# Patient Record
Sex: FEMALE | Race: WHITE | NOT HISPANIC OR LATINO | Employment: FULL TIME | URBAN - METROPOLITAN AREA
[De-identification: names, ages, dates, MRNs, and addresses within clinical notes are randomized per-mention and may not be internally consistent; named-entity substitution may affect disease eponyms.]

---

## 2017-08-15 ENCOUNTER — TRANSCRIBE ORDERS (OUTPATIENT)
Dept: ADMINISTRATIVE | Facility: HOSPITAL | Age: 57
End: 2017-08-15

## 2017-08-15 DIAGNOSIS — Z12.31 VISIT FOR SCREENING MAMMOGRAM: Primary | ICD-10-CM

## 2017-09-14 ENCOUNTER — HOSPITAL ENCOUNTER (OUTPATIENT)
Dept: MAMMOGRAPHY | Facility: MEDICAL CENTER | Age: 57
Discharge: HOME | End: 2017-09-14
Payer: COMMERCIAL

## 2017-09-14 DIAGNOSIS — Z12.31 VISIT FOR SCREENING MAMMOGRAM: ICD-10-CM

## 2017-09-14 PROCEDURE — G0202 SCR MAMMO BI INCL CAD: HCPCS

## 2018-02-15 ENCOUNTER — OFFICE VISIT (OUTPATIENT)
Dept: GYNECOLOGIC ONCOLOGY | Facility: CLINIC | Age: 58
End: 2018-02-15
Payer: COMMERCIAL

## 2018-02-15 VITALS
BODY MASS INDEX: 45.99 KG/M2 | HEART RATE: 104 BPM | TEMPERATURE: 99.2 F | WEIGHT: 293 LBS | HEIGHT: 67 IN | RESPIRATION RATE: 16 BRPM

## 2018-02-15 DIAGNOSIS — R93.89 THICKENED ENDOMETRIUM: Primary | ICD-10-CM

## 2018-02-15 DIAGNOSIS — N95.0 PMB (POSTMENOPAUSAL BLEEDING): ICD-10-CM

## 2018-02-15 PROCEDURE — 99242 OFF/OP CONSLTJ NEW/EST SF 20: CPT | Performed by: OBSTETRICS & GYNECOLOGY

## 2018-02-15 RX ORDER — HYDROCHLOROTHIAZIDE 50 MG/1
50 TABLET ORAL DAILY
COMMUNITY
Start: 2018-01-02 | End: 2020-05-19

## 2018-02-15 RX ORDER — BUPROPION HYDROCHLORIDE 150 MG/1
150 TABLET ORAL EVERY MORNING
COMMUNITY
Start: 2018-01-02 | End: 2019-12-08

## 2018-02-15 RX ORDER — SODIUM CHLORIDE, SODIUM LACTATE, POTASSIUM CHLORIDE, CALCIUM CHLORIDE 600; 310; 30; 20 MG/100ML; MG/100ML; MG/100ML; MG/100ML
125 INJECTION, SOLUTION INTRAVENOUS CONTINUOUS
Status: CANCELLED | OUTPATIENT
Start: 2018-02-15

## 2018-02-15 RX ORDER — ZINC GLUCONATE 50 MG
50 TABLET ORAL DAILY
COMMUNITY
End: 2020-02-12 | Stop reason: HOSPADM

## 2018-02-15 RX ORDER — ASCORBIC ACID 500 MG
500 TABLET ORAL DAILY
COMMUNITY
End: 2020-02-12 | Stop reason: HOSPADM

## 2018-02-15 NOTE — H&P
Assessment/Plan:    Problem List Items Addressed This Visit        Other    Thickened endometrium - Primary      Morbidly obese, BMI 52 kilograms/meter squared with 2 3 cm endometrium  Initial dilation curettage unsuccessful  Her performance status is 0   1  I discussed the risks and benefits of robotic assisted total laparoscopic hysterectomy and bilateral salpingo-oophorectomy  2   I discussed the risks and benefits of repeat dilation and curettage under ultrasound guidance  3  I discussed possible uterine pathology causing endometrial thickening  4   Based upon the risks and benefits of the procedures, she would prefer to have the most minimally invasive procedure performed and would prefer to proceed with the dilation and curettage under ultrasound guidance with possible hysteroscopy  She understands the risks and benefits of the surgery  Consent was obtained by me  She agrees to proceed as outlined  I spent 30 minutes with the patient  More than half the time was spent in counseling and coordination of care regarding treatment of her thickened endometrium  Thank you for the courtesy of this consultation  All questions were answered by the end of the visit  Relevant Orders    Case request operating room: DILATATION AND CURETTAGE (D&C), possible hysteroscopy (Completed)    Type and screen    Comprehensive metabolic panel    CBC and Platelet    APTT    Protime-INR    XR chest pa & lateral    PMB (postmenopausal bleeding)              CHIEF COMPLAINT:   Thickened endometrium, postmenopausal bleeding      Subjective:  See HPI    Problem:   thickened endometrium, postmenopausal bleeding    Previous therapy:   No history exists  Patient ID: Mare Avery is a 59 y o  female   52-year-old with morbid obesity, BMI 52 kilograms/meter squared who had postmenopausal bleeding for 2 episodes    She was evaluated with an ultrasound in August of 2017 that revealed the uterus to measure 9 5 x 5 9 cm with endometrial thickness of 2 3 cm  The ovaries were not visualized  An attempt was made to biopsy the endometrium in the office, but she could not tolerate the biopsy in the office  Therefore, she was taken for [facility]  which was technically difficult secondary to her morbid obesity and anatomy  She was therefore referred as a consultation by Dr Rowena Laurent to discuss treatment options for her thickened endometrium and postmenopausal bleeding  She currently does not have any vaginal bleeding or pelvic pain  Review of Systems   All other systems reviewed and are negative  Current Outpatient Prescriptions   Medication Sig Dispense Refill    ascorbic acid (VITAMIN C) 500 mg tablet Take 500 mg by mouth daily      buPROPion (WELLBUTRIN XL) 150 mg 24 hr tablet Take 150 mg by mouth      Calcium-Vitamin D-Vitamin K 500-100-40 MG-UNT-MCG CHEW Chew 600 mg      hydrochlorothiazide (HYDRODIURIL) 50 mg tablet Take 50 mg by mouth      metFORMIN (GLUCOPHAGE) 500 mg tablet Take 500 mg by mouth      Multiple Vitamins-Minerals (MULTIVITAMIN ADULT PO) Take 1 tablet by mouth      zinc gluconate 50 mg tablet Take 50 mg by mouth daily       No current facility-administered medications for this visit  Allergies   Allergen Reactions    Erythromycin Base GI Intolerance    Lisinopril Cough       History reviewed  No pertinent past medical history      Past Surgical History:   Procedure Laterality Date    APPENDECTOMY      AUGMENTATION BREAST      CERVICAL BIOPSY  W/ LOOP ELECTRODE EXCISION      TUBAL LIGATION      TUBAL REANASTOMOSIS         OB History      Para Term  AB Living    3 3            SAB TAB Ectopic Multiple Live Births                       Family History   Problem Relation Age of Onset    Cancer Maternal Aunt      BLADDER       The following portions of the patient's history were reviewed and updated as appropriate: allergies, current medications, past family history, past medical history, past social history, past surgical history and problem list       Objective:    Pulse 104, temperature 99 2 °F (37 3 °C), resp  rate 16, height 5' 7" (1 702 m), weight (!) 152 kg (334 lb)  Physical Exam   Constitutional: She is oriented to person, place, and time  She appears well-developed and well-nourished  No distress  Cardiovascular: Normal rate, regular rhythm and normal heart sounds  Pulmonary/Chest: Effort normal and breath sounds normal  No respiratory distress  She has no wheezes  She has no rales  Genitourinary:   Genitourinary Comments: Deferred   Neurological: She is alert and oriented to person, place, and time  No cranial nerve deficit  Skin: Skin is warm and dry  She is not diaphoretic  Psychiatric: She has a normal mood and affect   Her behavior is normal  Judgment and thought content normal

## 2018-02-15 NOTE — PATIENT INSTRUCTIONS
1  Nothing to eat or drink after midnight prior to the surgery  2   Taking medications as scheduled the morning of surgery with a sip of water with the exception of metformin

## 2018-02-15 NOTE — ASSESSMENT & PLAN NOTE
Morbidly obese, BMI 52 kilograms/meter squared with 2 3 cm endometrium  Initial dilation curettage unsuccessful  Her performance status is 0   1  I discussed the risks and benefits of robotic assisted total laparoscopic hysterectomy and bilateral salpingo-oophorectomy  2   I discussed the risks and benefits of repeat dilation and curettage under ultrasound guidance  3  I discussed possible uterine pathology causing endometrial thickening  4   Based upon the risks and benefits of the procedures, she would prefer to have the most minimally invasive procedure performed and would prefer to proceed with the dilation and curettage under ultrasound guidance with possible hysteroscopy  She understands the risks and benefits of the surgery  Consent was obtained by me  She agrees to proceed as outlined  I spent 30 minutes with the patient  More than half the time was spent in counseling and coordination of care regarding treatment of her thickened endometrium  Thank you for the courtesy of this consultation  All questions were answered by the end of the visit

## 2018-03-15 RX ORDER — KRILL/OM-3/DHA/EPA/PHOSPHO/AST 500MG-86MG
CAPSULE ORAL DAILY
COMMUNITY
End: 2020-02-06 | Stop reason: HOSPADM

## 2018-03-15 RX ORDER — LYSINE HCL 500 MG
TABLET ORAL
COMMUNITY

## 2018-03-19 ENCOUNTER — ANESTHESIA EVENT (OUTPATIENT)
Dept: PERIOP | Facility: HOSPITAL | Age: 58
End: 2018-03-19
Payer: COMMERCIAL

## 2018-03-20 ENCOUNTER — HOSPITAL ENCOUNTER (OUTPATIENT)
Facility: HOSPITAL | Age: 58
Setting detail: OUTPATIENT SURGERY
Discharge: HOME | End: 2018-03-20
Attending: OBSTETRICS & GYNECOLOGY | Admitting: OBSTETRICS & GYNECOLOGY
Payer: COMMERCIAL

## 2018-03-20 ENCOUNTER — ANESTHESIA (OUTPATIENT)
Dept: PERIOP | Facility: HOSPITAL | Age: 58
End: 2018-03-20
Payer: COMMERCIAL

## 2018-03-20 ENCOUNTER — HOSPITAL ENCOUNTER (OUTPATIENT)
Dept: RADIOLOGY | Facility: HOSPITAL | Age: 58
Discharge: HOME | End: 2018-03-20
Attending: OBSTETRICS & GYNECOLOGY
Payer: COMMERCIAL

## 2018-03-20 VITALS
TEMPERATURE: 100.3 F | RESPIRATION RATE: 16 BRPM | DIASTOLIC BLOOD PRESSURE: 76 MMHG | OXYGEN SATURATION: 95 % | SYSTOLIC BLOOD PRESSURE: 145 MMHG | HEART RATE: 80 BPM

## 2018-03-20 DIAGNOSIS — R93.89 THICKENED ENDOMETRIUM: ICD-10-CM

## 2018-03-20 DIAGNOSIS — R93.89 ABNORMAL RADIOLOGICAL FINDINGS IN SKIN AND SUBCUTANEOUS TISSUE: ICD-10-CM

## 2018-03-20 PROBLEM — N88.2 CERVICAL STENOSIS (UTERINE CERVIX): Status: ACTIVE | Noted: 2018-03-20

## 2018-03-20 PROBLEM — E66.01 MORBID OBESITY WITH BMI OF 50.0-59.9, ADULT (HCC): Status: ACTIVE | Noted: 2018-03-20

## 2018-03-20 LAB
ABO GROUP BLD: NORMAL
BLD GP AB SCN SERPL QL: NEGATIVE
GLUCOSE SERPL-MCNC: 116 MG/DL (ref 65–140)
GLUCOSE SERPL-MCNC: 120 MG/DL (ref 65–140)
RH BLD: POSITIVE
SPECIMEN EXPIRATION DATE: NORMAL

## 2018-03-20 PROCEDURE — 58558 HYSTEROSCOPY BIOPSY: CPT | Performed by: OBSTETRICS & GYNECOLOGY

## 2018-03-20 PROCEDURE — 86900 BLOOD TYPING SEROLOGIC ABO: CPT | Performed by: OBSTETRICS & GYNECOLOGY

## 2018-03-20 PROCEDURE — 86850 RBC ANTIBODY SCREEN: CPT | Performed by: OBSTETRICS & GYNECOLOGY

## 2018-03-20 PROCEDURE — 86901 BLOOD TYPING SEROLOGIC RH(D): CPT | Performed by: OBSTETRICS & GYNECOLOGY

## 2018-03-20 PROCEDURE — 82948 REAGENT STRIP/BLOOD GLUCOSE: CPT

## 2018-03-20 PROCEDURE — 88305 TISSUE EXAM BY PATHOLOGIST: CPT | Performed by: PATHOLOGY

## 2018-03-20 RX ORDER — SODIUM CHLORIDE, SODIUM LACTATE, POTASSIUM CHLORIDE, CALCIUM CHLORIDE 600; 310; 30; 20 MG/100ML; MG/100ML; MG/100ML; MG/100ML
125 INJECTION, SOLUTION INTRAVENOUS CONTINUOUS
Status: DISCONTINUED | OUTPATIENT
Start: 2018-03-20 | End: 2018-03-20

## 2018-03-20 RX ORDER — PROPOFOL 10 MG/ML
INJECTION, EMULSION INTRAVENOUS AS NEEDED
Status: DISCONTINUED | OUTPATIENT
Start: 2018-03-20 | End: 2018-03-20 | Stop reason: SURG

## 2018-03-20 RX ORDER — METOCLOPRAMIDE HYDROCHLORIDE 5 MG/ML
INJECTION INTRAMUSCULAR; INTRAVENOUS AS NEEDED
Status: DISCONTINUED | OUTPATIENT
Start: 2018-03-20 | End: 2018-03-20 | Stop reason: SURG

## 2018-03-20 RX ORDER — ONDANSETRON 2 MG/ML
4 INJECTION INTRAMUSCULAR; INTRAVENOUS EVERY 6 HOURS PRN
Status: DISCONTINUED | OUTPATIENT
Start: 2018-03-20 | End: 2018-03-20 | Stop reason: HOSPADM

## 2018-03-20 RX ORDER — KETOROLAC TROMETHAMINE 30 MG/ML
INJECTION, SOLUTION INTRAMUSCULAR; INTRAVENOUS AS NEEDED
Status: DISCONTINUED | OUTPATIENT
Start: 2018-03-20 | End: 2018-03-20 | Stop reason: SURG

## 2018-03-20 RX ORDER — LIDOCAINE HYDROCHLORIDE 10 MG/ML
INJECTION, SOLUTION INFILTRATION; PERINEURAL AS NEEDED
Status: DISCONTINUED | OUTPATIENT
Start: 2018-03-20 | End: 2018-03-20 | Stop reason: SURG

## 2018-03-20 RX ORDER — MIDAZOLAM HYDROCHLORIDE 1 MG/ML
INJECTION INTRAMUSCULAR; INTRAVENOUS AS NEEDED
Status: DISCONTINUED | OUTPATIENT
Start: 2018-03-20 | End: 2018-03-20 | Stop reason: SURG

## 2018-03-20 RX ORDER — ONDANSETRON 2 MG/ML
INJECTION INTRAMUSCULAR; INTRAVENOUS AS NEEDED
Status: DISCONTINUED | OUTPATIENT
Start: 2018-03-20 | End: 2018-03-20 | Stop reason: SURG

## 2018-03-20 RX ORDER — MAGNESIUM HYDROXIDE 1200 MG/15ML
LIQUID ORAL AS NEEDED
Status: DISCONTINUED | OUTPATIENT
Start: 2018-03-20 | End: 2018-03-20 | Stop reason: HOSPADM

## 2018-03-20 RX ORDER — OXYCODONE HYDROCHLORIDE 10 MG/1
10 TABLET ORAL EVERY 4 HOURS PRN
Status: DISCONTINUED | OUTPATIENT
Start: 2018-03-20 | End: 2018-03-20 | Stop reason: HOSPADM

## 2018-03-20 RX ORDER — ACETAMINOPHEN 325 MG/1
650 TABLET ORAL EVERY 4 HOURS PRN
Status: DISCONTINUED | OUTPATIENT
Start: 2018-03-20 | End: 2018-03-20 | Stop reason: HOSPADM

## 2018-03-20 RX ORDER — FENTANYL CITRATE/PF 50 MCG/ML
25 SYRINGE (ML) INJECTION
Status: DISCONTINUED | OUTPATIENT
Start: 2018-03-20 | End: 2018-03-20 | Stop reason: HOSPADM

## 2018-03-20 RX ORDER — OXYCODONE HYDROCHLORIDE AND ACETAMINOPHEN 5; 325 MG/1; MG/1
1 TABLET ORAL EVERY 4 HOURS PRN
Status: DISCONTINUED | OUTPATIENT
Start: 2018-03-20 | End: 2018-03-20 | Stop reason: HOSPADM

## 2018-03-20 RX ORDER — FENTANYL CITRATE 50 UG/ML
INJECTION, SOLUTION INTRAMUSCULAR; INTRAVENOUS AS NEEDED
Status: DISCONTINUED | OUTPATIENT
Start: 2018-03-20 | End: 2018-03-20 | Stop reason: SURG

## 2018-03-20 RX ORDER — ONDANSETRON 2 MG/ML
4 INJECTION INTRAMUSCULAR; INTRAVENOUS ONCE AS NEEDED
Status: DISCONTINUED | OUTPATIENT
Start: 2018-03-20 | End: 2018-03-20 | Stop reason: HOSPADM

## 2018-03-20 RX ADMIN — KETOROLAC TROMETHAMINE 30 MG: 30 INJECTION, SOLUTION INTRAMUSCULAR at 08:35

## 2018-03-20 RX ADMIN — FENTANYL CITRATE 50 MCG: 50 INJECTION, SOLUTION INTRAMUSCULAR; INTRAVENOUS at 08:07

## 2018-03-20 RX ADMIN — ONDANSETRON 4 MG: 2 INJECTION INTRAMUSCULAR; INTRAVENOUS at 08:08

## 2018-03-20 RX ADMIN — PROPOFOL 200 MG: 10 INJECTION, EMULSION INTRAVENOUS at 07:58

## 2018-03-20 RX ADMIN — METOCLOPRAMIDE 10 MG: 5 INJECTION, SOLUTION INTRAMUSCULAR; INTRAVENOUS at 08:00

## 2018-03-20 RX ADMIN — FENTANYL CITRATE 25 MCG: 50 INJECTION, SOLUTION INTRAMUSCULAR; INTRAVENOUS at 08:20

## 2018-03-20 RX ADMIN — DEXAMETHASONE SODIUM PHOSPHATE 10 MG: 10 INJECTION INTRAMUSCULAR; INTRAVENOUS at 08:05

## 2018-03-20 RX ADMIN — MIDAZOLAM HYDROCHLORIDE 2 MG: 1 INJECTION, SOLUTION INTRAMUSCULAR; INTRAVENOUS at 07:50

## 2018-03-20 RX ADMIN — LIDOCAINE HYDROCHLORIDE 50 MG: 10 INJECTION, SOLUTION INFILTRATION; PERINEURAL at 07:58

## 2018-03-20 RX ADMIN — FENTANYL CITRATE 25 MCG: 50 INJECTION, SOLUTION INTRAMUSCULAR; INTRAVENOUS at 08:15

## 2018-03-20 RX ADMIN — SODIUM CHLORIDE, SODIUM LACTATE, POTASSIUM CHLORIDE, AND CALCIUM CHLORIDE: .6; .31; .03; .02 INJECTION, SOLUTION INTRAVENOUS at 07:45

## 2018-03-20 NOTE — H&P
Assessment/Plan:    69-year-old with morbid obesity, BMI 52 kilograms/meter squared with postmenopausal bleeding, endometrial thickness 2 3 cm  Performance status is 0   1   Plan for dilation and curettage under ultrasound guidance  CHIEF COMPLAINT:   Thickened endometrium, postmenopausal bleeding      Subjective:  See HPI    Problem:   thickened endometrium, postmenopausal bleeding    Previous therapy:   No history exists  Patient ID: Gael Jarrell is a 59 y o  female   69-year-old with morbid obesity, BMI 52 kilograms/meter squared who had postmenopausal bleeding for 2 episodes  She was evaluated with an ultrasound in August of 2017 that revealed the uterus to measure 9 5 x 5 9 cm with endometrial thickness of 2 3 cm  The ovaries were not visualized  An attempt was made to biopsy the endometrium in the office, but she could not tolerate the biopsy in the office  Therefore, she was taken for [facility]  which was technically difficult secondary to her morbid obesity and anatomy  She was therefore referred as a consultation by Dr Lizeth Gary to discuss treatment options for her thickened endometrium and postmenopausal bleeding  She currently does not have any vaginal bleeding or pelvic pain  No changes in her medical history or medications since her last visit  Review of Systems   All other systems reviewed and are negative        Current Facility-Administered Medications   Medication Dose Route Frequency Provider Last Rate Last Dose    lactated ringers infusion  125 mL/hr Intravenous Continuous Rachna Peters MD           Allergies   Allergen Reactions    Erythromycin Base GI Intolerance    Lisinopril Cough       Past Medical History:   Diagnosis Date    Diabetes mellitus ([address] )     Hypertension        Past Surgical History:   Procedure Laterality Date    APPENDECTOMY      AUGMENTATION BREAST      CERVICAL BIOPSY  W/ LOOP ELECTRODE EXCISION      TUBAL LIGATION      TUBAL REANASTOMOSIS         OB History      Para Term  AB Living    3 3            SAB TAB Ectopic Multiple Live Births                       Family History   Problem Relation Age of Onset    Cancer Maternal Aunt      BLADDER       The following portions of the patient's history were reviewed and updated as appropriate: allergies, current medications, past family history, past medical history, past social history, past surgical history and problem list       Objective:    Blood pressure (!) 193/89, pulse 96, temperature 99 2 °F (37 3 °C), temperature source Tympanic Core, resp  rate 20, SpO2 92 %  Physical Exam   Constitutional: She is oriented to person, place, and time  She appears well-developed and well-nourished  No distress  Cardiovascular: Normal rate, regular rhythm and normal heart sounds  Pulmonary/Chest: Effort normal and breath sounds normal    Genitourinary:   Genitourinary Comments: Deferred   Neurological: She is alert and oriented to person, place, and time  No cranial nerve deficit  Skin: Skin is warm and dry  She is not diaphoretic  Psychiatric: She has a normal mood and affect   Her behavior is normal  Judgment and thought content normal

## 2018-03-20 NOTE — DISCHARGE INSTRUCTIONS
Post-Gynecologic Surgery Discharge Instructions:  1  No heavy lifting more than one full gallon of milk (about 8 lbs) for 1 week  2  Nothing in the vagina for 4 weeks  3  You may take stairs one at a time, touching each step with both feet for the first few days, then as tolerated  4  Call the office for fever greater than 100  4'F, heavy vaginal bleeding, or increasing pain  5  Activity as tolerated  Post Operative Pain Management:  If you have cramping or mild pain you may take 600 mg Ibuprofen every 6 hours to relieve  If you continue to have residual mild pain not entirely relieved by Ibuprofen then you may take 650 mg of tylenol every 6 hours  If you have any questions regarding your prescriptions please call your doctor  Dilation and Curettage   WHAT YOU NEED TO KNOW:   Dilation and curettage (D&C) is a procedure to remove tissue from the lining of your uterus  DISCHARGE INSTRUCTIONS:   Call 911 for any of the following:   · You have signs of an allergic reaction, such as hives, trouble breathing, or severe swelling  Seek care immediately if:   · You have heavy vaginal bleeding that soaks 1 pad in 1 hour for 2 hours in a row  · You have a fever higher than 100 4°F (38°C)  · You have abdominal cramps for more than 2 days  · Your pain does not get better, even after treatment  Contact your healthcare provider if:   · You have foul-smelling vaginal discharge  · You do not get your monthly period  · You feel depressed or anxious  · You feel very tired and weak  · You have questions or concerns about your condition or care  Medicines: You may need any of the following:  · Prescription pain medicine  may be given  Do not wait until the pain is severe before you take your medicine  Ask your healthcare provider how to take this medicine safely  · Antibiotics  help fight or prevent a bacterial infection  · Take your medicine as directed    Contact your healthcare provider if you think your medicine is not helping or if you have side effects  Tell him or her if you are allergic to any medicine  Keep a list of the medicines, vitamins, and herbs you take  Include the amounts, and when and why you take them  Bring the list or the pill bottles to follow-up visits  Carry your medicine list with you in case of an emergency  Self-care:   · Use sanitary pads if needed  You may have light bleeding for up to 2 weeks  Do not use tampons  Use sanitary pads instead  This will help prevent a vaginal infection  · Rest as needed  Slowly start to do more each day  Return to your daily activities as directed  · Do not have sex for at least 2 weeks after the procedure  This will help prevent an infection  · Use birth control right after your procedure  Your monthly period should start again in 4 to 8 weeks  During this time, you could still ovulate (release an egg)  Use birth control as directed to prevent pregnancy during this time  Follow up with your healthcare provider as directed:  Write down your questions so you remember to ask them during your visits  © 2017 [employer]  Information is for End User's use only and may not be sold, redistributed or otherwise used for commercial purposes  All illustrations and images included in CareNotes® are the copyrighted property of [employer]  or Christopher Arre[employer]  The above information is an  only  It is not intended as medical advice for individual conditions or treatments  Talk to your doctor, nurse or pharmacist before following any medical regimen to see if it is safe and effective for you

## 2018-03-20 NOTE — OP NOTE
OPERATIVE REPORT  PATIENT NAME: Kathy Ballesteros    :  1960  MRN: [id number]  Pt Location: [facility] OR ROOM 14    SURGERY DATE: 3/20/2018    Surgeon(s) and Role:     * Riky Cox MD - Primary     * Adal Grayson - Assisting     * Arcelia Perez MD - Assisting     * Geri Hamilton MD - Observing    Preop Diagnosis: Thickened endometrium [R93 8], morbid obesity BMI 52 kilograms/meter squared, cervical stenosis    Post-Op Diagnosis Codes: * Thickened endometrium [R93 8], morbid obesity BMI 52 kilograms/meter squared, cervical stenosis    Procedure(s) (LRB):  DILATATION AND CURETTAGE (D&C),HYSTEROSCOPY (N/A)  HYSTEROSCOPY (N/A)    Specimen(s):  ID Type Source Tests Collected by Time Destination   1 : Endometrial currettings Tissue Endometrium TISSUE EXAM Riky Cox MD 3/20/2018 0872        Estimated Blood Loss:   Minimal    Drains:       Anesthesia Type:   General/LMA    Operative Indications: Thickened endometrium [R808]  15-year-old with postmenopausal bleeding, thickened endometrium, morbid obesity with BMI 52 kilograms/meter squared with cervical stenosis  She presented for D and C under ultrasound guidance    Operative Findings:  1  Exam under anesthesia revealed a mobile uterus  The exact size and contour could not be determined secondary to morbid obesity  The cervix was noted to be scarred to the vaginal apex  It was stenotic  2   On hysteroscopy, there were 2 small polyps present within the endometrium  After curettage and polypectomy, there were noted to be removed  Complications:   None    Procedure and Technique:  After informed consent was obtained, the patient was taken to the operating room where general endotracheal anesthesia was administered without incident  She was then prepped and draped in normal sterile fashion in the dorsal lithotomy position  Examination under anesthesia revealed the above-mentioned findings  A speculum was placed in the patient's vagina    The anterior vaginal wall above the cervical os was grasped with a single-tooth tenaculum  Cervical os was noted to be stenotic  Therefore, a Jennifer clamp was used to dilate the external cervical os  A uterine sound was then able to be passed into the endometrial cavity that measured approximately 8 cm in depth  The cervix was then dilated with [facility] dilators  A hysteroscopy was performed with findings noted as above  The cervix was then dilated further and polyp forceps were used to remove small polyps  A sharp curettage was then performed under ultrasound guidance  In order to facilitate the ultrasound, 180 cc of saline were instilled into the bladder using a red rubber catheter  Once the curettage was complete, repeat hysteroscopy demonstrated removal of the polyps  All specimens were sent for permanent section  The tenaculum was removed  Hemostasis was spontaneous  The bladder was then drained with the red rubber catheter  She was then awakened and transferred to the recovery room in stable condition  All instrument counts correct x2 for procedure  No complications  Estimated blood loss is less than 50 mL     I was present for the entire procedure    Patient Disposition:  PACU     SIGNATURE: Ana Johnson MD  DATE: March 20, 2018  TIME: 8:47 AM

## 2018-03-20 NOTE — ANESTHESIA POSTPROCEDURE EVALUATION
Post-Op Assessment Note      CV Status:  Stable    Mental Status:  Alert    Hydration Status:  Stable    PONV Controlled:  None    Airway Patency:  Patent    Post Op Vitals Reviewed: Yes          Staff: CRNA           BP  138/84   Temp   98 3   Pulse  84   Resp   16   SpO2   93%

## 2018-03-20 NOTE — ANESTHESIA PREPROCEDURE EVALUATION
Review of Systems/Medical History  Patient summary reviewed  Chart reviewed  No history of anesthetic complications     Cardiovascular  Exercise tolerance: good,  Hypertension controlled,    Pulmonary  Smoker (quit 2010) ex-smoker  , No recent URI ,        GI/Hepatic  Negative GI/hepatic ROS   No GERD ,   Comment: Confirmed NPO appropriate     Negative  ROS        Endo/Other  Diabetes (Glc 120 in preop holding) type 2 Diet controlled,   Obesity  super morbid obesity   GYN      Comment: Post-menopausal spotting     Hematology  Negative hematology ROS      Musculoskeletal  Negative musculoskeletal ROS        Neurology  Negative neurology ROS      Psychology   Negative psychology ROS              Physical Exam    Airway    Mallampati score: III  TM Distance: >3 FB  Neck ROM: full     Dental   No notable dental hx     Cardiovascular  Rhythm: regular, Rate: normal, Cardiovascular exam normal    Pulmonary  Pulmonary exam normal Breath sounds clear to auscultation,     Other Findings        Anesthesia Plan  ASA Score- 3     Anesthesia Type- general with ASA Monitors  Additional Monitors:   Airway Plan:         Plan Factors-    Induction- intravenous  Postoperative Plan- Plan for postoperative opioid use  Informed Consent- Anesthetic plan and risks discussed with patient            No results found for: WBC, HGB, HCT, MCV, PLT  No results found for: GLUCOSE, CALCIUM, NA, K, CO2, CL, BUN, CREATININE  No results found for: HGBA1C

## 2018-03-22 ENCOUNTER — TELEPHONE (OUTPATIENT)
Dept: GYNECOLOGIC ONCOLOGY | Facility: CLINIC | Age: 58
End: 2018-03-22

## 2018-04-04 PROBLEM — Z98.890 POSTOPERATIVE STATE: Status: ACTIVE | Noted: 2018-04-04

## 2018-04-05 ENCOUNTER — OFFICE VISIT (OUTPATIENT)
Dept: GYNECOLOGIC ONCOLOGY | Facility: CLINIC | Age: 58
End: 2018-04-05

## 2018-04-05 VITALS
HEART RATE: 105 BPM | SYSTOLIC BLOOD PRESSURE: 168 MMHG | BODY MASS INDEX: 45.99 KG/M2 | WEIGHT: 293 LBS | HEIGHT: 67 IN | RESPIRATION RATE: 18 BRPM | DIASTOLIC BLOOD PRESSURE: 88 MMHG

## 2018-04-05 DIAGNOSIS — Z98.890 POSTOPERATIVE STATE: Primary | ICD-10-CM

## 2018-04-05 PROCEDURE — 99024 POSTOP FOLLOW-UP VISIT: CPT | Performed by: OBSTETRICS & GYNECOLOGY

## 2018-04-05 NOTE — PROGRESS NOTES
Assessment/Plan:    Problem List Items Addressed This Visit        Other    Postoperative state - Primary       Recovering well status post D&C hysteroscopy for endometrial polyps  1  She will follow up with Dr Dale Ross  for continued gynecologic care  I encouraged her to call the office with any additional questions or concerns  CHIEF COMPLAINT:  Here for postoperative visit       Problem:   postmenopausal bleeding, thickened endometrium      Previous therapy:   dilation and curettage under ultrasound guidance with hysteroscopy on 3/20/2018-endometrial polyps identified    Patient ID: Avinash Mederos is a 59 y o  female    Returns for postoperative care  She has no vaginal bleeding  She has returned to her normal activity  The following portions of the patient's history were reviewed and updated as appropriate: allergies, current medications, past family history, past medical history, past social history, past surgical history and problem list     Review of Systems      Current Outpatient Prescriptions:     ascorbic acid (VITAMIN C) 500 mg tablet, Take 500 mg by mouth daily, Disp: , Rfl:     buPROPion (WELLBUTRIN XL) 150 mg 24 hr tablet, Take 150 mg by mouth every morning  , Disp: , Rfl:     Calcium Carbonate-Vit D-Min (CALCIUM 600+D PLUS MINERALS) 600-400 MG-UNIT TABS, Take by mouth, Disp: , Rfl:     hydrochlorothiazide (HYDRODIURIL) 50 mg tablet, Take 50 mg by mouth daily  , Disp: , Rfl:     Krill Oil 500 MG CAPS, Take by mouth daily, Disp: , Rfl:     metFORMIN (GLUCOPHAGE) 500 mg tablet, Take 500 mg by mouth 2 (two) times a day with meals  , Disp: , Rfl:     Multiple Vitamins-Minerals (MULTIVITAMIN ADULT PO), Take 1 tablet by mouth, Disp: , Rfl:     POTASSIUM PO, Take by mouth daily, Disp: , Rfl:     zinc gluconate 50 mg tablet, Take 50 mg by mouth daily, Disp: , Rfl:     Objective:    Blood pressure 168/88, pulse 105, resp   rate 18, height 5' 7" (1 702 m), weight (!) 155 kg (341 lb)  Body mass index is 53 41 kg/m²  Body surface area is 2 54 meters squared  Physical Exam   Constitutional: She is oriented to person, place, and time  She appears well-developed and well-nourished  Neurological: She is alert and oriented to person, place, and time  Psychiatric: She has a normal mood and affect   Her behavior is normal  Judgment and thought content normal

## 2018-04-05 NOTE — ASSESSMENT & PLAN NOTE
Recovering well status post D&C hysteroscopy for endometrial polyps  1  She will follow up with Dr Kurt Medina  for continued gynecologic care  I encouraged her to call the office with any additional questions or concerns

## 2018-04-05 NOTE — LETTER
April 5, 2018     [address]    Patient: Sharon Thornton   YOB: 1960   Date of Visit: 4/5/2018       Dear Dr Magen Geller:    Thank you for referring Sharon Thornton to me for evaluation  Below are my notes for this consultation  If you have questions, please do not hesitate to call me  I look forward to following your patient along with you  Sincerely,        Ranjan Loya MD        CC: No Recipients  Ranjan Loya MD  4/5/2018  5:46 PM  Sign at close encounter  Assessment/Plan:    Problem List Items Addressed This Visit        Other    Postoperative state - Primary       Recovering well status post D&C hysteroscopy for endometrial polyps  1  She will follow up with Dr Magen Geller  for continued gynecologic care  I encouraged her to call the office with any additional questions or concerns  CHIEF COMPLAINT:  Here for postoperative visit       Problem:   postmenopausal bleeding, thickened endometrium      Previous therapy:   dilation and curettage under ultrasound guidance with hysteroscopy on 3/20/2018-endometrial polyps identified    Patient ID: Sharon Thornton is a 59 y o  female    Returns for postoperative care  She has no vaginal bleeding  She has returned to her normal activity          The following portions of the patient's history were reviewed and updated as appropriate: allergies, current medications, past family history, past medical history, past social history, past surgical history and problem list     Review of Systems      Current Outpatient Prescriptions:     ascorbic acid (VITAMIN C) 500 mg tablet, Take 500 mg by mouth daily, Disp: , Rfl:     buPROPion (WELLBUTRIN XL) 150 mg 24 hr tablet, Take 150 mg by mouth every morning  , Disp: , Rfl:     Calcium Carbonate-Vit D-Min (CALCIUM 600+D PLUS MINERALS) 600-400 MG-UNIT TABS, Take by mouth, Disp: , Rfl:     hydrochlorothiazide (HYDRODIURIL) 50 mg tablet, Take 50 mg by mouth daily  , Disp: , Rfl:     Krill Oil 500 MG CAPS, Take by mouth daily, Disp: , Rfl:     metFORMIN (GLUCOPHAGE) 500 mg tablet, Take 500 mg by mouth 2 (two) times a day with meals  , Disp: , Rfl:     Multiple Vitamins-Minerals (MULTIVITAMIN ADULT PO), Take 1 tablet by mouth, Disp: , Rfl:     POTASSIUM PO, Take by mouth daily, Disp: , Rfl:     zinc gluconate 50 mg tablet, Take 50 mg by mouth daily, Disp: , Rfl:     Objective:    Blood pressure 168/88, pulse 105, resp  rate 18, height 5' 7" (1 702 m), weight (!) 155 kg (341 lb)  Body mass index is 53 41 kg/m²  Body surface area is 2 54 meters squared  Physical Exam   Constitutional: She is oriented to person, place, and time  She appears well-developed and well-nourished  Neurological: She is alert and oriented to person, place, and time  Psychiatric: She has a normal mood and affect   Her behavior is normal  Judgment and thought content normal

## 2019-12-08 ENCOUNTER — HOSPITAL ENCOUNTER (EMERGENCY)
Facility: HOSPITAL | Age: 59
Discharge: HOME | End: 2019-12-08
Attending: EMERGENCY MEDICINE | Admitting: EMERGENCY MEDICINE
Payer: COMMERCIAL

## 2019-12-08 ENCOUNTER — APPOINTMENT (EMERGENCY)
Dept: RADIOLOGY | Facility: HOSPITAL | Age: 59
End: 2019-12-08
Payer: COMMERCIAL

## 2019-12-08 VITALS
HEART RATE: 88 BPM | RESPIRATION RATE: 20 BRPM | DIASTOLIC BLOOD PRESSURE: 64 MMHG | BODY MASS INDEX: 53.52 KG/M2 | OXYGEN SATURATION: 93 % | SYSTOLIC BLOOD PRESSURE: 127 MMHG | WEIGHT: 293 LBS | TEMPERATURE: 97.8 F

## 2019-12-08 DIAGNOSIS — S93.602A FOOT SPRAIN, LEFT, INITIAL ENCOUNTER: Primary | ICD-10-CM

## 2019-12-08 PROCEDURE — 73630 X-RAY EXAM OF FOOT: CPT

## 2019-12-08 PROCEDURE — 99284 EMERGENCY DEPT VISIT MOD MDM: CPT | Performed by: EMERGENCY MEDICINE

## 2019-12-08 PROCEDURE — 99283 EMERGENCY DEPT VISIT LOW MDM: CPT

## 2019-12-08 RX ORDER — ACETAMINOPHEN 325 MG/1
650 TABLET ORAL ONCE
Status: COMPLETED | OUTPATIENT
Start: 2019-12-08 | End: 2019-12-08

## 2019-12-08 RX ADMIN — ACETAMINOPHEN 650 MG: 325 TABLET ORAL at 18:38

## 2019-12-08 NOTE — ED PROVIDER NOTES
History  Chief Complaint   Patient presents with    Fall     Pt reports she fell off step stool outside around 1400 today pta  Complains of left foot foot pain  Pt denies hitting head  Denies loc       62 y o  F p/w left foot pain x 4h  Pt was hanging [place] lights  She was on the 2nd step of a stool and a branch broke off, causing her to fall off the stool  She believes she rolled her foot  She denies head injury or LOC  She has been ambulatory on her foot  She initially used ice and compression, but reports the pain is persisting  Pt notes the pain is located to the top of her foot  History provided by:  Patient   used: No    Fall   Mechanism of injury: fall    Injury location:  Foot  Foot injury location:  L foot  Incident location:  Home  Time since incident:  4 hours  Arrived directly from scene: no    Fall:     Fall occurred:  From a stool  Suspicion of alcohol use: no    Suspicion of drug use: no    Prior to arrival data:     Loss of consciousness: no      Amnesic to event: no    Associated symptoms: no loss of consciousness        Prior to Admission Medications   Prescriptions Last Dose Informant Patient Reported? Taking?    Calcium Carbonate-Vit D-Min (CALCIUM 600+D PLUS MINERALS) 600-400 MG-UNIT TABS   Yes No   Sig: Take by mouth   Krill Oil 500 MG CAPS   Yes No   Sig: Take by mouth daily   Multiple Vitamins-Minerals (MULTIVITAMIN ADULT PO)   Yes No   Sig: Take 1 tablet by mouth   POTASSIUM PO   Yes No   Sig: Take by mouth daily   ascorbic acid (VITAMIN C) 500 mg tablet  Self Yes No   Sig: Take 500 mg by mouth daily   hydrochlorothiazide (HYDRODIURIL) 50 mg tablet   Yes No   Sig: Take 50 mg by mouth daily     metFORMIN (GLUCOPHAGE) 500 mg tablet   Yes No   Sig: Take 500 mg by mouth 2 (two) times a day with meals     zinc gluconate 50 mg tablet  Self Yes No   Sig: Take 50 mg by mouth daily      Facility-Administered Medications: None       Past Medical History:   Diagnosis Date    Diabetes mellitus ([facility] [address] )     Hypertension        Past Surgical History:   Procedure Laterality Date    APPENDECTOMY      AUGMENTATION BREAST      CERVICAL BIOPSY  W/ LOOP ELECTRODE EXCISION      HYSTEROSCOPY N/A 3/20/2018    Procedure: HYSTEROSCOPY;  Surgeon: Slim Holland MD;  [facility];  Service: Gynecology Oncology    OR DILATION/CURETTAGE,DIAGNOSTIC N/A 3/20/2018    Procedure: DILATATION AND CURETTAGE (D&C),HYSTEROSCOPY;  Surgeon: Slim Holland MD;  [facility];  Service: Gynecology Oncology    TUBAL LIGATION      TUBAL REANASTOMOSIS         Family History   Problem Relation Age of Onset    Cancer Maternal Aunt         BLADDER     I have reviewed and agree with the history as documented  Social History     Tobacco Use    Smoking status: Former Smoker     Types: Cigarettes    Smokeless tobacco: Never Used    Tobacco comment: QUIT 2010   Substance Use Topics    Alcohol use: Yes     Alcohol/week: 8 0 standard drinks     Types: 7 Glasses of wine, 1 Standard drinks or equivalent per week    Drug use: No        Review of Systems   Musculoskeletal:        Left foot pain     Skin: Negative for color change and wound  Neurological: Negative for loss of consciousness, weakness and numbness  All other systems reviewed and are negative  Physical Exam  Physical Exam   Constitutional: She appears well-developed and well-nourished  Non-toxic appearance  She does not have a sickly appearance  She does not appear ill  No distress  Neck: No JVD present  Cardiovascular: Intact distal pulses  Exam reveals no decreased pulses  Pulses:       Dorsalis pedis pulses are 2+ on the left side  Pulmonary/Chest: Effort normal  No tachypnea  No respiratory distress  Musculoskeletal:        Left ankle: Normal         Left lower leg: Normal         Left foot: There is tenderness (Top of foot)  There is normal range of motion, no crepitus and no deformity     Neurological: She is alert  She has normal strength  No sensory deficit  Skin: She is not diaphoretic  Nursing note and vitals reviewed  Vital Signs  ED Triage Vitals [12/08/19 1826]   Temperature Pulse Respirations Blood Pressure SpO2   97 8 °F (36 6 °C) 88 20 127/64 93 %      Temp Source Heart Rate Source Patient Position - Orthostatic VS BP Location FiO2 (%)   Oral Monitor -- Left arm --      Pain Score       --           Vitals:    12/08/19 1826   BP: 127/64   Pulse: 88         Visual Acuity  Visual Acuity      Most Recent Value   L Pupil Size (mm)  4   R Pupil Size (mm)  4          ED Medications  Medications   acetaminophen (TYLENOL) tablet 650 mg (650 mg Oral Given 12/8/19 1838)       Diagnostic Studies  Results Reviewed     None                 XR foot 3+ views LEFT   ED Interpretation by Lauren Sharp 24, DO (12/08 1911)   No fracture                 Procedures  Procedures         ED Course  ED Course as of Dec 08 2019   Sun Dec 08, 2019   1911 Updated pt on negative xray results  Pt states she has an appt with her PCP in 4 days  Instructed her to f/u with PCP for f/u of foot pain  Will place pt in shoe and give crutches for comfort  MDM  Number of Diagnoses or Management Options     Amount and/or Complexity of Data Reviewed  Tests in the radiology section of CPT®: ordered and reviewed          Disposition  Final diagnoses: Foot sprain, left, initial encounter     Time reflects when diagnosis was documented in both MDM as applicable and the Disposition within this note     Time User Action Codes Description Comment    12/8/2019  7:10 PM Dennis [id number] sprain, left, initial encounter       ED Disposition     ED Disposition Condition Date/Time Comment    Discharge Stable Sun Dec 8, 2019  7:11 PM Kareem Ruiz discharge to home/self care              Follow-up Information     Follow up With Specialties Details Why Contact Info    Julius Aponte MD  Go to  For follow up as scheduled [id number]   [address] [facility]            Discharge Medication List as of 12/8/2019  7:11 PM      CONTINUE these medications which have NOT CHANGED    Details   ascorbic acid (VITAMIN C) 500 mg tablet Take 500 mg by mouth daily, Historical Med      Calcium Carbonate-Vit D-Min (CALCIUM 600+D PLUS MINERALS) 600-400 MG-UNIT TABS Take by mouth, Historical Med      hydrochlorothiazide (HYDRODIURIL) 50 mg tablet Take 50 mg by mouth daily  , Starting Tue 1/2/2018, Until Wed 1/2/2019, Historical Med      Krill Oil 500 MG CAPS Take by mouth daily, Historical Med      metFORMIN (GLUCOPHAGE) 500 mg tablet Take 500 mg by mouth 2 (two) times a day with meals  , Starting Tue 1/2/2018, Until Wed 1/2/2019, Historical Med      Multiple Vitamins-Minerals (MULTIVITAMIN ADULT PO) Take 1 tablet by mouth, Starting Mon 5/5/2008, Historical Med      POTASSIUM PO Take by mouth daily, Historical Med      zinc gluconate 50 mg tablet Take 50 mg by mouth daily, Historical Med           No discharge procedures on file      ED Provider  Electronically Signed by           Lauren Ortega, DO  12/08/19 2019

## 2019-12-09 ENCOUNTER — HOSPITAL ENCOUNTER (EMERGENCY)
Facility: HOSPITAL | Age: 59
Discharge: HOME | End: 2019-12-09
Attending: EMERGENCY MEDICINE | Admitting: EMERGENCY MEDICINE
Payer: COMMERCIAL

## 2019-12-09 ENCOUNTER — APPOINTMENT (EMERGENCY)
Dept: CT IMAGING | Facility: HOSPITAL | Age: 59
End: 2019-12-09
Payer: COMMERCIAL

## 2019-12-09 VITALS
HEART RATE: 95 BPM | TEMPERATURE: 98.5 F | SYSTOLIC BLOOD PRESSURE: 160 MMHG | DIASTOLIC BLOOD PRESSURE: 87 MMHG | OXYGEN SATURATION: 95 % | RESPIRATION RATE: 16 BRPM

## 2019-12-09 DIAGNOSIS — S92.323A FRACTURE OF 2ND METATARSAL: Primary | ICD-10-CM

## 2019-12-09 DIAGNOSIS — S92.333A: ICD-10-CM

## 2019-12-09 DIAGNOSIS — S92.309A METATARSAL FRACTURE: ICD-10-CM

## 2019-12-09 LAB
ANION GAP SERPL CALCULATED.3IONS-SCNC: 8 MMOL/L (ref 4–13)
APTT PPP: 27 SECONDS (ref 23–37)
BASOPHILS # BLD AUTO: 0.06 THOUSANDS/ΜL (ref 0–0.1)
BASOPHILS NFR BLD AUTO: 1 % (ref 0–1)
BUN SERPL-MCNC: 11 MG/DL (ref 5–25)
CALCIUM SERPL-MCNC: 8.7 MG/DL (ref 8.3–10.1)
CHLORIDE SERPL-SCNC: 101 MMOL/L (ref 100–108)
CO2 SERPL-SCNC: 31 MMOL/L (ref 21–32)
CREAT SERPL-MCNC: 0.65 MG/DL (ref 0.6–1.3)
EOSINOPHIL # BLD AUTO: 0.17 THOUSAND/ΜL (ref 0–0.61)
EOSINOPHIL NFR BLD AUTO: 2 % (ref 0–6)
ERYTHROCYTE [DISTWIDTH] IN BLOOD BY AUTOMATED COUNT: 13.8 % (ref 11.6–15.1)
GFR SERPL CREATININE-BSD FRML MDRD: 96 ML/MIN/1.73SQ M
GLUCOSE SERPL-MCNC: 116 MG/DL (ref 65–140)
HCT VFR BLD AUTO: 43.8 % (ref 34.8–46.1)
HGB BLD-MCNC: 14.3 G/DL (ref 11.5–15.4)
IMM GRANULOCYTES # BLD AUTO: 0.03 THOUSAND/UL (ref 0–0.2)
IMM GRANULOCYTES NFR BLD AUTO: 0 % (ref 0–2)
INR PPP: 1 (ref 0.84–1.19)
LYMPHOCYTES # BLD AUTO: 2.27 THOUSANDS/ΜL (ref 0.6–4.47)
LYMPHOCYTES NFR BLD AUTO: 21 % (ref 14–44)
MCH RBC QN AUTO: 33.9 PG (ref 26.8–34.3)
MCHC RBC AUTO-ENTMCNC: 32.6 G/DL (ref 31.4–37.4)
MCV RBC AUTO: 104 FL (ref 82–98)
MONOCYTES # BLD AUTO: 0.95 THOUSAND/ΜL (ref 0.17–1.22)
MONOCYTES NFR BLD AUTO: 9 % (ref 4–12)
NEUTROPHILS # BLD AUTO: 7.23 THOUSANDS/ΜL (ref 1.85–7.62)
NEUTS SEG NFR BLD AUTO: 67 % (ref 43–75)
NRBC BLD AUTO-RTO: 0 /100 WBCS
PLATELET # BLD AUTO: 135 THOUSANDS/UL (ref 149–390)
PMV BLD AUTO: 9.9 FL (ref 8.9–12.7)
POTASSIUM SERPL-SCNC: 3.5 MMOL/L (ref 3.5–5.3)
PROTHROMBIN TIME: 13.3 SECONDS (ref 11.6–14.5)
RBC # BLD AUTO: 4.22 MILLION/UL (ref 3.81–5.12)
SODIUM SERPL-SCNC: 140 MMOL/L (ref 136–145)
WBC # BLD AUTO: 10.71 THOUSAND/UL (ref 4.31–10.16)

## 2019-12-09 PROCEDURE — 73700 CT LOWER EXTREMITY W/O DYE: CPT

## 2019-12-09 PROCEDURE — 85730 THROMBOPLASTIN TIME PARTIAL: CPT | Performed by: PHYSICIAN ASSISTANT

## 2019-12-09 PROCEDURE — 80048 BASIC METABOLIC PNL TOTAL CA: CPT | Performed by: PHYSICIAN ASSISTANT

## 2019-12-09 PROCEDURE — 99283 EMERGENCY DEPT VISIT LOW MDM: CPT | Performed by: PHYSICIAN ASSISTANT

## 2019-12-09 PROCEDURE — 85610 PROTHROMBIN TIME: CPT | Performed by: PHYSICIAN ASSISTANT

## 2019-12-09 PROCEDURE — 85025 COMPLETE CBC W/AUTO DIFF WBC: CPT | Performed by: PHYSICIAN ASSISTANT

## 2019-12-09 PROCEDURE — 36415 COLL VENOUS BLD VENIPUNCTURE: CPT | Performed by: PHYSICIAN ASSISTANT

## 2019-12-09 RX ORDER — OXYCODONE HYDROCHLORIDE AND ACETAMINOPHEN 5; 325 MG/1; MG/1
1 TABLET ORAL EVERY 6 HOURS PRN
Qty: 6 TABLET | Refills: 0 | Status: SHIPPED | OUTPATIENT
Start: 2019-12-09 | End: 2019-12-19

## 2019-12-09 RX ORDER — OXYCODONE HYDROCHLORIDE AND ACETAMINOPHEN 5; 325 MG/1; MG/1
1 TABLET ORAL ONCE
Status: COMPLETED | OUTPATIENT
Start: 2019-12-09 | End: 2019-12-09

## 2019-12-09 RX ADMIN — OXYCODONE HYDROCHLORIDE AND ACETAMINOPHEN 1 TABLET: 5; 325 TABLET ORAL at 20:28

## 2019-12-09 NOTE — RESULT ENCOUNTER NOTE
Pt called back - spoke with Podiatry - DR Yaw Minor- here seeing pts - discussed case - he wants pt to have a CT and get a cam boot and see her in FU in office tomorrow - pt spoke with him on phone and will come for CT

## 2019-12-10 NOTE — DISCHARGE INSTRUCTIONS
Keep splint on and dry - FU with podiatry tomorrow  Please refer to the attached information for strict return instructions  If symptoms worsen or new symptoms develop please return to the ER

## 2019-12-10 NOTE — ED PROVIDER NOTES
History  Chief Complaint   Patient presents with    Foot Pain     L foot, call back to come in for CT     Patient injured her left foot while trying to hang [place] lights  Patient was seen here initial x-ray said no fracture however radiology read shows fracture I discussed case with [address] - wants pt to have CT- and to get a Cam boot walker  Will see patient in the office tomorrow patient was to come back today to have the CT done and so she return for the additional imaging  Patient was unable to get a ride until this evening and so she came in for evaluation  Prior to Admission Medications   Prescriptions Last Dose Informant Patient Reported? Taking?    Calcium Carbonate-Vit D-Min (CALCIUM 600+D PLUS MINERALS) 600-400 MG-UNIT TABS   Yes No   Sig: Take by mouth   Krill Oil 500 MG CAPS   Yes No   Sig: Take by mouth daily   Multiple Vitamins-Minerals (MULTIVITAMIN ADULT PO)   Yes No   Sig: Take 1 tablet by mouth   POTASSIUM PO   Yes No   Sig: Take by mouth daily   ascorbic acid (VITAMIN C) 500 mg tablet  Self Yes No   Sig: Take 500 mg by mouth daily   hydrochlorothiazide (HYDRODIURIL) 50 mg tablet   Yes No   Sig: Take 50 mg by mouth daily     metFORMIN (GLUCOPHAGE) 500 mg tablet   Yes No   Sig: Take 500 mg by mouth 2 (two) times a day with meals     zinc gluconate 50 mg tablet  Self Yes No   Sig: Take 50 mg by mouth daily      Facility-Administered Medications: None       Past Medical History:   Diagnosis Date    Diabetes mellitus ([facility] [address] )     Hypertension        Past Surgical History:   Procedure Laterality Date    APPENDECTOMY      AUGMENTATION BREAST      CERVICAL BIOPSY  W/ LOOP ELECTRODE EXCISION      HYSTEROSCOPY N/A 3/20/2018    Procedure: HYSTEROSCOPY;  Surgeon: Korin Barron MD;  [facility];  Service: Gynecology Oncology    CT DILATION/CURETTAGE,DIAGNOSTIC N/A 3/20/2018    Procedure: DILATATION AND CURETTAGE (D&C),HYSTEROSCOPY;  Surgeon: Korin Barron MD;  [facility];  Service: Gynecology Oncology    TUBAL LIGATION      TUBAL REANASTOMOSIS         Family History   Problem Relation Age of Onset    Cancer Maternal Aunt         BLADDER     I have reviewed and agree with the history as documented  Social History     Tobacco Use    Smoking status: Former Smoker     Types: Cigarettes    Smokeless tobacco: Never Used    Tobacco comment: QUIT 2010   Substance Use Topics    Alcohol use: Yes     Alcohol/week: 8 0 standard drinks     Types: 7 Glasses of wine, 1 Standard drinks or equivalent per week    Drug use: No        Review of Systems   All other systems reviewed and are negative  Physical Exam  Physical Exam   Constitutional: She appears well-developed and well-nourished  obesity   HENT:   Head: Normocephalic and atraumatic  Right Ear: Tympanic membrane and external ear normal    Left Ear: Tympanic membrane and external ear normal    Mouth/Throat: Oropharynx is clear and moist    Eyes: Conjunctivae and EOM are normal    Neck: Neck supple  Cardiovascular: Normal rate, regular rhythm, normal heart sounds and intact distal pulses  Pulmonary/Chest: Effort normal and breath sounds normal    Abdominal: Soft  Bowel sounds are normal    Musculoskeletal:        Left knee: Normal         Left foot: There is decreased range of motion, tenderness, bony tenderness and swelling  Feet:    Comes in with crutches and orhto shoe to left foot  Lymphadenopathy:     She has no cervical adenopathy  Neurological: She is alert  Skin: Skin is warm  No rash noted  Psychiatric: She has a normal mood and affect  Her behavior is normal    Nursing note and vitals reviewed        Vital Signs  ED Triage Vitals [12/09/19 2000]   Temperature Pulse Respirations Blood Pressure SpO2   98 5 °F (36 9 °C) 95 16 160/87 95 %      Temp Source Heart Rate Source Patient Position - Orthostatic VS BP Location FiO2 (%)   Temporal -- Sitting Left arm --      Pain Score       6           Vitals:    12/09/19 2000   BP: 160/87   Pulse: 95   Patient Position - Orthostatic VS: Sitting         Visual Acuity      ED Medications  Medications   oxyCODONE-acetaminophen (PERCOCET) 5-325 mg per tablet 1 tablet (1 tablet Oral Given 12/9/19 2028)       Diagnostic Studies  Results Reviewed     Procedure Component Value Units Date/Time    CBC and differential [[id number]]  (Abnormal) Collected:  12/09/19 2140    Lab Status:  Final result Specimen:  Blood from Hand, Right Updated:  12/09/19 2149     WBC 10 71 Thousand/uL      RBC 4 22 Million/uL      Hemoglobin 14 3 g/dL      Hematocrit 43 8 %       fL      MCH 33 9 pg      MCHC 32 6 g/dL      RDW 13 8 %      MPV 9 9 fL      Platelets 400 Thousands/uL      nRBC 0 /100 WBCs      Neutrophils Relative 67 %      Immat GRANS % 0 %      Lymphocytes Relative 21 %      Monocytes Relative 9 %      Eosinophils Relative 2 %      Basophils Relative 1 %      Neutrophils Absolute 7 23 Thousands/µL      Immature Grans Absolute 0 03 Thousand/uL      Lymphocytes Absolute 2 27 Thousands/µL      Monocytes Absolute 0 95 Thousand/µL      Eosinophils Absolute 0 17 Thousand/µL      Basophils Absolute 0 06 Thousands/µL     Basic metabolic panel [[id number]] Collected:  12/09/19 2140    Lab Status: In process Specimen:  Blood from Hand, Right Updated:  12/09/19 2145    Protime-INR [[id number]] Collected:  12/09/19 2140    Lab Status: In process Specimen:  Blood from Arm, Right Updated:  12/09/19 2145    APTT [[id number]] Collected:  12/09/19 2140    Lab Status:   In process Specimen:  Blood from Arm, Right Updated:  12/09/19 2145                 CT foot left wo contrast    (Results Pending)              Procedures  Procedures         ED Course  ED Course as of Dec 09 2152   Mon Dec 09, 2019   2030 Called to get cam boot walker - left message w answering service awaiting call back      2053 Went for CT      2127 Talked to podiatry - he reviewed CT - has a sublte lisfranc fracture - fracture of metatarsals 2,3,4 requests that she have a posterior splint and labs for surgery and he will see her tomorrow  Wants pt to be as non weight-bearing as possible  Will do ambulatory challenge with patient's crutches and/or a walker here  2150 Signed out awaiting labs - to [facility]                                    MDM  Number of Diagnoses or Management Options  Fracture of 2nd metatarsal: new and requires workup  Fracture of 3rd metatarsal: new and requires workup  Metatarsal fracture: established and worsening     Amount and/or Complexity of Data Reviewed  Discuss the patient with other providers: yes (DR Davis Harris- podiatry)    Risk of Complications, Morbidity, and/or Mortality  General comments: Signed out to [facility] awaiting labs  Percocet controls pain  Patient Progress  Patient progress: improved        Disposition  Final diagnoses:   Fracture of 2nd metatarsal   Fracture of 3rd metatarsal   Metatarsal fracture     Time reflects when diagnosis was documented in both MDM as applicable and the Disposition within this note     Time User Action Codes Description Comment    12/9/2019  9:38 PM Yeni Banuelos [S92 323A] Fracture of 2nd metatarsal     12/9/2019  9:39 PM Yeni Banuelos [S92 333A] Fracture of 3rd metatarsal     12/9/2019  9:39 PM Yeni Banuelos [S92 309A] Metatarsal fracture       ED Disposition     ED Disposition Condition Date/Time Comment    Discharge Stable Mon Dec 9, 2019  9:38 PM Steve Camacho discharge to home/self care              Follow-up Information     Follow up With Specialties Details Why Contact Info    Courtney Joshua DPM Podiatry, Wound Care In 1 day tomorrow [place]   [address] [facility]  [phone]            Patient's Medications   Discharge Prescriptions    OXYCODONE-ACETAMINOPHEN (PERCOCET) 5-325 MG PER TABLET    Take 1 tablet by mouth every 6 (six) hours as needed for moderate pain for up to 10 daysMax Daily Amount: 4 tablets       Start Date: 12/9/2019 End Date: 12/19/2019       Order Dose: 1 tablet       Quantity: 6 tablet    Refills: 0     No discharge procedures on file      ED Provider  Electronically Signed by           Erin Gutierrez PA-C  12/09/19 4002

## 2019-12-13 ENCOUNTER — ANESTHESIA EVENT (OUTPATIENT)
Dept: PERIOP | Facility: HOSPITAL | Age: 59
End: 2019-12-13
Payer: COMMERCIAL

## 2019-12-13 NOTE — PRE-PROCEDURE INSTRUCTIONS
Pre-Surgery Instructions:   Medication Instructions    hydrochlorothiazide (HYDRODIURIL) 50 mg tablet Instructed patient per Anesthesia Guidelines  Pre-op Showering Instructions for Surgery Patients    Before your operation, you play an important role in decreasing your risk for infection by washing with special antiseptic soap  This is an effective way to reduce bacteria on the skin which may help to prevent infections at the surgical site  Please read the following directions in advance  1  In the week before your operation, purchase a 4 ounce bottle of antiseptic soap containing chlorhexidine gluconate (CHG)  4%  Some brand names include: Aplicare®, Endure, and Hibiclens®  The cost is usually less than $5 00   For your convenience, the [employer] carries the soap   It may also be available at your doctors office or pre-admission testing center, and at most retail pharmacies   If you are allergic or sensitive to soaps containing CHG, please let your doctor know so another antiseptic can be suggested   CHG antiseptic soap is for external use only  2  The day before your operation, follow these instructions carefully to get ready   Please clean linens (sheets) on your bed; you should sleep on clean sheets after your evening shower   Get clean towels and washcloth ready - you need enough for 2 showers   Set aside clean underwear, pajamas, and clothing to wear after the showers     Reminders:   DO NOT use any other soap or body rinse on your skin during or after the antiseptic showers   DO NOT use lotion, powder, deodorant, or perfume/aftershave of any kind on your skin after your antiseptic shower   DO NOT shave any body parts in the 24 hours/day before your operation   DO NOT get the antiseptic soap in your eyes, ears, nose, mouth, or vaginal area    3   You will need to shower the night before AND the morning of your surgery  Shower 1:   The first evening before the operation, take the first shower   First, shampoo your hair with regular shampoo and rinse it completely before you use the antiseptic soap  After washing and rinsing your hair, rinse your body   Next, use a clean washcloth to apply the antiseptic soap and wash your body from the neck down to your toes using ½ bottle of the antiseptic soap   You will use the other ½ bottle for the second shower   Clean the area where your incision will be; lather this area well for about 2 minutes   If you are having head or neck surgery, wash areas with the antiseptic soap   Rinse yourself completely with running water   Use a clean towel to dry off   Wear clean underwear and clothing/pajamas  Shower 2   The morning of your operation, take the second shower following the same steps as Shower 1 using the second ½ of the bottle of antiseptic soap   Use clean cloths and towels to wash and dry yourself   Wear clean underwear and clothing

## 2019-12-13 NOTE — ANESTHESIA PREPROCEDURE EVALUATION
Review of Systems/Medical History  Patient summary reviewed  Chart reviewed  No history of anesthetic complications     Cardiovascular  Exercise tolerance (METS): <4,  Hypertension controlled,    Pulmonary  Smoker ex-smoker  , No pneumonia, No COPD , No asthma , Sleep apnea (likely) ,        GI/Hepatic  Negative GI/hepatic ROS          Negative  ROS        Endo/Other  Diabetes well controlled type 2 Oral agent,   Obesity  super morbid obesity   GYN      Comment: postmenopausal     Hematology  Negative hematology ROS      Musculoskeletal  Negative musculoskeletal ROS        Neurology  Negative neurology ROS      Psychology   Negative psychology ROS              Physical Exam    Airway      TM Distance: >3 FB  Neck ROM: full     Dental       Cardiovascular  Cardiovascular exam normal    Pulmonary  Pulmonary exam normal     Other Findings  Fixed upper and lower teeth and in good repair      Anesthesia Plan  ASA Score- 3     Anesthesia Type-   Additional Monitors:   Airway Plan: LMA  Comment: Likely REBEKAH   LMA available  Plan Factors-Patient not instructed to abstain from smoking on day of procedure  Patient did not smoke on day of surgery  Induction- intravenous  Postoperative Plan- Plan for postoperative opioid use  Informed Consent- Anesthetic plan and risks discussed with patient and son  I personally reviewed this patient with the CRNA  Discussed and agreed on the Anesthesia Plan with the CRNA  Glenn Torres

## 2019-12-16 ENCOUNTER — HOSPITAL ENCOUNTER (OUTPATIENT)
Facility: HOSPITAL | Age: 59
Setting detail: OUTPATIENT SURGERY
Discharge: HOME | End: 2019-12-16
Attending: PODIATRIST | Admitting: PODIATRIST
Payer: COMMERCIAL

## 2019-12-16 ENCOUNTER — ANESTHESIA (OUTPATIENT)
Dept: PERIOP | Facility: HOSPITAL | Age: 59
End: 2019-12-16
Payer: COMMERCIAL

## 2019-12-16 ENCOUNTER — APPOINTMENT (OUTPATIENT)
Dept: RADIOLOGY | Facility: HOSPITAL | Age: 59
End: 2019-12-16
Payer: COMMERCIAL

## 2019-12-16 VITALS
DIASTOLIC BLOOD PRESSURE: 76 MMHG | WEIGHT: 293 LBS | BODY MASS INDEX: 45.99 KG/M2 | HEIGHT: 67 IN | OXYGEN SATURATION: 97 % | RESPIRATION RATE: 16 BRPM | HEART RATE: 77 BPM | SYSTOLIC BLOOD PRESSURE: 150 MMHG | TEMPERATURE: 96.3 F

## 2019-12-16 DIAGNOSIS — Z98.890 POSTOPERATIVE STATE: Primary | ICD-10-CM

## 2019-12-16 LAB — GLUCOSE SERPL-MCNC: 104 MG/DL (ref 65–140)

## 2019-12-16 PROCEDURE — C1713 ANCHOR/SCREW BN/BN,TIS/BN: HCPCS | Performed by: PODIATRIST

## 2019-12-16 PROCEDURE — 82948 REAGENT STRIP/BLOOD GLUCOSE: CPT

## 2019-12-16 PROCEDURE — 73620 X-RAY EXAM OF FOOT: CPT

## 2019-12-16 PROCEDURE — 73630 X-RAY EXAM OF FOOT: CPT

## 2019-12-16 DEVICE — LISFRANC SCREW
Type: IMPLANTABLE DEVICE | Site: TOE | Status: FUNCTIONAL
Brand: CHARLOTTE

## 2019-12-16 RX ORDER — BUPIVACAINE HYDROCHLORIDE 5 MG/ML
INJECTION, SOLUTION PERINEURAL AS NEEDED
Status: DISCONTINUED | OUTPATIENT
Start: 2019-12-16 | End: 2019-12-16 | Stop reason: HOSPADM

## 2019-12-16 RX ORDER — PROPOFOL 10 MG/ML
INJECTION, EMULSION INTRAVENOUS AS NEEDED
Status: DISCONTINUED | OUTPATIENT
Start: 2019-12-16 | End: 2019-12-16 | Stop reason: SURG

## 2019-12-16 RX ORDER — FENTANYL CITRATE/PF 50 MCG/ML
25 SYRINGE (ML) INJECTION
Status: DISCONTINUED | OUTPATIENT
Start: 2019-12-16 | End: 2019-12-16 | Stop reason: HOSPADM

## 2019-12-16 RX ORDER — MAGNESIUM HYDROXIDE 1200 MG/15ML
LIQUID ORAL AS NEEDED
Status: DISCONTINUED | OUTPATIENT
Start: 2019-12-16 | End: 2019-12-16 | Stop reason: HOSPADM

## 2019-12-16 RX ORDER — DEXAMETHASONE SODIUM PHOSPHATE 4 MG/ML
4 INJECTION, SOLUTION INTRA-ARTICULAR; INTRALESIONAL; INTRAMUSCULAR; INTRAVENOUS; SOFT TISSUE ONCE AS NEEDED
Status: DISCONTINUED | OUTPATIENT
Start: 2019-12-16 | End: 2019-12-16 | Stop reason: HOSPADM

## 2019-12-16 RX ORDER — KETOROLAC TROMETHAMINE 30 MG/ML
INJECTION, SOLUTION INTRAMUSCULAR; INTRAVENOUS AS NEEDED
Status: DISCONTINUED | OUTPATIENT
Start: 2019-12-16 | End: 2019-12-16 | Stop reason: SURG

## 2019-12-16 RX ORDER — DIPHENHYDRAMINE HYDROCHLORIDE 50 MG/ML
12.5 INJECTION INTRAMUSCULAR; INTRAVENOUS ONCE
Status: DISCONTINUED | OUTPATIENT
Start: 2019-12-16 | End: 2019-12-16 | Stop reason: HOSPADM

## 2019-12-16 RX ORDER — SODIUM CHLORIDE, SODIUM LACTATE, POTASSIUM CHLORIDE, CALCIUM CHLORIDE 600; 310; 30; 20 MG/100ML; MG/100ML; MG/100ML; MG/100ML
75 INJECTION, SOLUTION INTRAVENOUS CONTINUOUS
Status: DISCONTINUED | OUTPATIENT
Start: 2019-12-16 | End: 2019-12-16 | Stop reason: HOSPADM

## 2019-12-16 RX ORDER — LIDOCAINE HYDROCHLORIDE 10 MG/ML
INJECTION, SOLUTION EPIDURAL; INFILTRATION; INTRACAUDAL; PERINEURAL AS NEEDED
Status: DISCONTINUED | OUTPATIENT
Start: 2019-12-16 | End: 2019-12-16 | Stop reason: SURG

## 2019-12-16 RX ORDER — FENTANYL CITRATE 50 UG/ML
INJECTION, SOLUTION INTRAMUSCULAR; INTRAVENOUS AS NEEDED
Status: DISCONTINUED | OUTPATIENT
Start: 2019-12-16 | End: 2019-12-16 | Stop reason: SURG

## 2019-12-16 RX ORDER — OXYCODONE HYDROCHLORIDE AND ACETAMINOPHEN 5; 325 MG/1; MG/1
1 TABLET ORAL EVERY 4 HOURS PRN
Qty: 20 TABLET | Refills: 0 | Status: SHIPPED | OUTPATIENT
Start: 2019-12-16 | End: 2019-12-26

## 2019-12-16 RX ORDER — OXYCODONE HYDROCHLORIDE AND ACETAMINOPHEN 5; 325 MG/1; MG/1
1 TABLET ORAL EVERY 4 HOURS PRN
Status: DISCONTINUED | OUTPATIENT
Start: 2019-12-16 | End: 2019-12-16 | Stop reason: HOSPADM

## 2019-12-16 RX ORDER — MIDAZOLAM HYDROCHLORIDE 2 MG/2ML
INJECTION, SOLUTION INTRAMUSCULAR; INTRAVENOUS AS NEEDED
Status: DISCONTINUED | OUTPATIENT
Start: 2019-12-16 | End: 2019-12-16 | Stop reason: SURG

## 2019-12-16 RX ORDER — PROPOFOL 10 MG/ML
INJECTION, EMULSION INTRAVENOUS CONTINUOUS PRN
Status: DISCONTINUED | OUTPATIENT
Start: 2019-12-16 | End: 2019-12-16 | Stop reason: SURG

## 2019-12-16 RX ORDER — ONDANSETRON 2 MG/ML
INJECTION INTRAMUSCULAR; INTRAVENOUS AS NEEDED
Status: DISCONTINUED | OUTPATIENT
Start: 2019-12-16 | End: 2019-12-16 | Stop reason: SURG

## 2019-12-16 RX ORDER — FENTANYL CITRATE/PF 50 MCG/ML
12.5 SYRINGE (ML) INJECTION
Status: DISCONTINUED | OUTPATIENT
Start: 2019-12-16 | End: 2019-12-16 | Stop reason: HOSPADM

## 2019-12-16 RX ORDER — CEFAZOLIN SODIUM 2 G/50ML
2000 SOLUTION INTRAVENOUS ONCE
Status: DISCONTINUED | OUTPATIENT
Start: 2019-12-16 | End: 2019-12-16 | Stop reason: HOSPADM

## 2019-12-16 RX ADMIN — FENTANYL CITRATE 50 MCG: 50 INJECTION INTRAMUSCULAR; INTRAVENOUS at 15:14

## 2019-12-16 RX ADMIN — ONDANSETRON HYDROCHLORIDE 4 MG: 2 INJECTION, SOLUTION INTRAMUSCULAR; INTRAVENOUS at 15:13

## 2019-12-16 RX ADMIN — PROPOFOL 50 MG: 10 INJECTION, EMULSION INTRAVENOUS at 14:50

## 2019-12-16 RX ADMIN — MIDAZOLAM HYDROCHLORIDE 2 MG: 1 INJECTION, SOLUTION INTRAMUSCULAR; INTRAVENOUS at 14:45

## 2019-12-16 RX ADMIN — KETOROLAC TROMETHAMINE 30 MG: 30 INJECTION, SOLUTION INTRAMUSCULAR; INTRAVENOUS at 15:35

## 2019-12-16 RX ADMIN — FENTANYL CITRATE 50 MCG: 50 INJECTION INTRAMUSCULAR; INTRAVENOUS at 14:50

## 2019-12-16 RX ADMIN — LIDOCAINE HYDROCHLORIDE 50 MG: 10 INJECTION, SOLUTION EPIDURAL; INFILTRATION; INTRACAUDAL; PERINEURAL at 14:50

## 2019-12-16 RX ADMIN — Medication 3000 MG: at 14:51

## 2019-12-16 RX ADMIN — PROPOFOL 160 MCG/KG/MIN: 10 INJECTION, EMULSION INTRAVENOUS at 14:50

## 2019-12-16 RX ADMIN — SODIUM CHLORIDE, SODIUM LACTATE, POTASSIUM CHLORIDE, AND CALCIUM CHLORIDE: .6; .31; .03; .02 INJECTION, SOLUTION INTRAVENOUS at 14:30

## 2019-12-16 RX ADMIN — PROPOFOL 150 MG: 10 INJECTION, EMULSION INTRAVENOUS at 14:58

## 2019-12-16 RX ADMIN — ONDANSETRON HYDROCHLORIDE 4 MG: 2 INJECTION, SOLUTION INTRAMUSCULAR; INTRAVENOUS at 15:35

## 2019-12-16 NOTE — DISCHARGE SUMMARY
Discharge Summary Outpatient Procedure Podiatry -   Brian Arm 62 y o  female MRN: [id number]  Unit/Bed#: OR POOL Encounter: [id number]    Admission Date: 12/16/2019     Admitting Diagnosis: Dislocation of tarsometatarsal joint of left foot, initial encounter [S93 325A]    Discharge Diagnosis: same    Procedures Performed: ORIF LISFRANC:  (LEFT)    Complications: none    Condition at Discharge: stable    Discharge instructions/Information to patient and family:   See after visit summary for information provided to patient and family  Provisions for Follow-Up Care/Important appointments:  See after visit summary for information related to follow-up care and any pertinent home health orders  Discharge Medications:  See after visit summary for reconciled discharge medications provided to patient and family  Stay healthy - Follow-up with your pediatrician within 48 hours of discharge.     Routine Home Care Instructions:  - Please call us for help if you feel sad, blue or overwhelmed for more than a few days after discharge  - Umbilical cord care:        - Please keep your baby's cord clean and dry (do not apply alcohol)        - Please keep your baby's diaper below the umbilical cord until it has fallen off (~10-14 days)        - Please do not submerge your baby in a bath until the cord has fallen off (sponge bath instead)    - Continue feeding child at least every 3 hours, wake baby to feed if needed.     Please contact your pediatrician and return to the hospital if you notice any of the following:   - Fever  (T > 100.4)  - Reduced amount of wet diapers (< 5-6 per day) or no wet diaper in 12 hours  - Increased fussiness, irritability, or crying inconsolably  - Lethargy (excessively sleepy, difficult to arouse)  - Breathing difficulties (noisy breathing, breathing fast, using belly and neck muscles to breath)  - Changes in the baby’s color (yellow, blue, pale, gray)  - Seizure or loss of consciousness

## 2019-12-16 NOTE — OP NOTE
OPERATIVE REPORT - Podiatry  PATIENT NAME: Nathaniel Freed    :  1960  MRN: [id number]  Pt Location:  OR ROOM 11    SURGERY DATE: 2019    Surgeon(s) and Role:     * Silvino Chester DPM - Primary     * Rajan Hope DPM - Assisting    Pre-op Diagnosis:  Dislocation of tarsometatarsal joint of left foot, initial encounter [S93 325A]    Post-Op Diagnosis Codes:     * Dislocation of tarsometatarsal joint of left foot, initial encounter [S93 325A]    Procedure(s) (LRB):  ORIF LISFRANC (Left)   Fluoroscopy less than 1 hour    Specimen(s):  * No specimens in log *    Estimated Blood Loss:   Minimal    Drains:  * No LDAs found *    Anesthesia Type:   IV Sedation with Anesthesia with 30 ml of 0 5% Bupivacaine  Postoperative injection consisting of 6 mL of 0 5% bupivacaine plain  Hemostasis:  Left pneumatic ankle tourniquet 250 mm per Hg for 21 minutes  Materials:  1x Danay Nearing 3 7x40mm Lisfranc compression screw  3-0 nylon    Operative Findings:  Consistent with diagnosis  Complications:   None    Procedure and Technique:     Under mild sedation, the patient was brought into the operating room and placed on the operating room table in the supine position  A pneumatic tourniquet was then placed around the patient's left lower extremity with ample webril padding  A time out was performed to confirm the correct patient, procedure and site with all parties in agreement  Following IV sedation, a ankle/ring block was performed consisting of 30 ml of 0 5% Bupivacaine  The foot was then scrubbed, prepped and draped in the usual aseptic manner  An esmarch bandage was utilized to exsangunate the patients foot and the tourniquet was then inflated  The esmarch bandage was removed and the foot was placed on the operating room table  Attention was then directed to the dorsal aspect of the midfoot    Using fluoroscopy, the lateral aspect of the base of the 2nd metatarsal and the medial aspect of the medial cuneiform was marked out  Two stab incisions were made at the sites, and the subcutaneous tissue was deepened with a hemostat  A [facility] Lisfranc system was then used per manufacture protocol  A 3 7x40mm Lisfranc compression screw was placed across the Lisfranc ligament  Adequate compression was noted on x-ray  Skin edges were reapproximated and closure was obtained utilizing interrupted retention sutures utilizing 3-0 nylon  The foot was then cleansed and dried  A postoperative injection consisting of 6 ml of 0 5% Bupivacaine was performed  The incision site was dressed with Xeroform, and dry sterile dressing  This was then covered with a Kerlix, Webril, and Coban  The tourniquet was deflated and normal hyperemic flush was noted to all digits  The patient tolerated the procedure and anesthesia well and was transported to the PACU with vital signs stable  Dr Yang Evans was present during the entire procedure and participated in all key aspects  SIGNATURE: Jake Monreal DPM  DATE: December 16, 2019  TIME: 3:56 PM      Portions of the record may have been created with voice recognition software  Occasional wrong word or "sound a like" substitutions may have occurred due to the inherent limitations of voice recognition software  Read the chart carefully and recognize, using context, where substitutions have occurred

## 2019-12-16 NOTE — NURSING NOTE
Returned from PACU at [id number]  Alert and oriented  Denied pain  Dressing dry and intact to left foot  No drainage  Boot in place  Foot elevated  Toes warm to touch  Brisk capillary refill  Respirations easy and non labored  IV fluids continue  Call bell in reach

## 2019-12-16 NOTE — DISCHARGE INSTRUCTIONS
Dr Cassie Manley  Post-Operative Instructions    1  Take your prescribed medication as directed  2  Upon arrival at home, lie down and elevate your surgical foot on 2 pillows  3  Remain quiet, off your feet as much as possible, for the first 24-48 hours  This is when your feet first swell and may become painful  After 48 hours you may begin limited walking following these restrictions:   Nonweightbearing to surgical foot (may use heel touch for transfers only)  4  Drink large quantities of water  Consume no alcohol  Continue a well-balanced diet  5  Report any unusual discomfort or fever to this office  6  A limited amount of discomfort and swelling is to be expected  In some cases the skin may take on a bruised appearance  The surgical solution that was applied to your foot prior to the operation is dark in color and the operation site may appear to be oozing when it actually is not  7  A slight amount of blood is to be expected, and is no cause for alarm  Do not remove the dressings  If there is active bleeding and if the bleeding persists, add additional gauze to the bandage, apply direct pressure, elevate your feet and call this office  8  Do not get the dressings wet  As regular bathing may be inconvenient, sponge baths are recommended  9  When anesthesia wears off and if any discomfort should be present, apply an ice pack directly over the operated area for 15 minute intervals for several hours or until the pain leaves  (USE IN EXCESS OF 15 MINUTES COULD CAUSE FROSTBITE)  Do not use hot water bags or electric pads  A convenient icepack can be made by placing ice cubes in a plastic bag and covering this with a towel  10  If necessary, take a mild laxative before retiring  11  Wear your special open shoes anytime you put weight on your foot, even if it is just to walk to the bathroom and back  It will probably be 2 or 3 weeks before you will be permitted to try regular shoes    12  Having performed the operation, we are interested in a prompt recovery  Please cooperate by following the above instructions  13  Please call to confirm your post-op appointment or call with any other questions

## 2019-12-16 NOTE — ANESTHESIA POSTPROCEDURE EVALUATION
Post-Op Assessment Note    CV Status:  Stable  Pain Score: 0    Pain management: adequate     Mental Status:  Alert and awake   Hydration Status:  Euvolemic   PONV Controlled:  Controlled   Airway Patency:  Patent   Post Op Vitals Reviewed: Yes      Staff: Anesthesiologist         NC O2 3 l/m  /86 (12/16/19 1548)    Temp 97 8 °F (36 6 °C) (12/16/19 1548)    Pulse 83 (12/16/19 1548)   Resp 20 (12/16/19 1548)    SpO2 91 % (12/16/19 1548)

## 2019-12-17 NOTE — NURSING NOTE
Voided in bathroom prior to discharge  Instructions were given  Will  pain medication at pharmacy  No distress

## 2020-01-21 ENCOUNTER — APPOINTMENT (EMERGENCY)
Dept: RADIOLOGY | Facility: HOSPITAL | Age: 60
DRG: 175 | End: 2020-01-21
Payer: COMMERCIAL

## 2020-01-21 ENCOUNTER — HOSPITAL ENCOUNTER (INPATIENT)
Facility: HOSPITAL | Age: 60
LOS: 1 days | DRG: 175 | End: 2020-01-22
Attending: HOSPITALIST | Admitting: HOSPITALIST
Payer: COMMERCIAL

## 2020-01-21 ENCOUNTER — APPOINTMENT (EMERGENCY)
Dept: CT IMAGING | Facility: HOSPITAL | Age: 60
DRG: 175 | End: 2020-01-21
Payer: COMMERCIAL

## 2020-01-21 DIAGNOSIS — R09.02 HYPOXIA: ICD-10-CM

## 2020-01-21 DIAGNOSIS — I26.99 PE (PULMONARY THROMBOEMBOLISM) (HCC): Primary | ICD-10-CM

## 2020-01-21 PROBLEM — E66.01 MORBID OBESITY DUE TO EXCESS CALORIES (HCC): Status: ACTIVE | Noted: 2020-01-21

## 2020-01-21 PROBLEM — I26.09 ACUTE PULMONARY EMBOLISM WITH ACUTE COR PULMONALE (HCC): Status: ACTIVE | Noted: 2020-01-21

## 2020-01-21 PROBLEM — E11.9 TYPE 2 DIABETES MELLITUS WITHOUT COMPLICATION, WITHOUT LONG-TERM CURRENT USE OF INSULIN (HCC): Status: ACTIVE | Noted: 2020-01-21

## 2020-01-21 PROBLEM — I10 ESSENTIAL HYPERTENSION: Status: ACTIVE | Noted: 2020-01-21

## 2020-01-21 LAB
ANION GAP SERPL CALCULATED.3IONS-SCNC: 9 MMOL/L (ref 4–13)
APTT PPP: 111 SECONDS (ref 23–37)
APTT PPP: 27 SECONDS (ref 23–37)
ATRIAL RATE: 86 BPM
BASOPHILS # BLD AUTO: 0.07 THOUSANDS/ΜL (ref 0–0.1)
BASOPHILS NFR BLD AUTO: 1 % (ref 0–1)
BUN SERPL-MCNC: 25 MG/DL (ref 5–25)
CALCIUM SERPL-MCNC: 9.3 MG/DL (ref 8.3–10.1)
CHLORIDE SERPL-SCNC: 103 MMOL/L (ref 100–108)
CO2 SERPL-SCNC: 32 MMOL/L (ref 21–32)
CREAT SERPL-MCNC: 1.09 MG/DL (ref 0.6–1.3)
D DIMER PPP FEU-MCNC: >10 UG/ML FEU
EOSINOPHIL # BLD AUTO: 0.08 THOUSAND/ΜL (ref 0–0.61)
EOSINOPHIL NFR BLD AUTO: 1 % (ref 0–6)
ERYTHROCYTE [DISTWIDTH] IN BLOOD BY AUTOMATED COUNT: 13 % (ref 11.6–15.1)
GFR SERPL CREATININE-BSD FRML MDRD: 55 ML/MIN/1.73SQ M
GLUCOSE SERPL-MCNC: 117 MG/DL (ref 65–140)
GLUCOSE SERPL-MCNC: 118 MG/DL (ref 65–140)
GLUCOSE SERPL-MCNC: 134 MG/DL (ref 65–140)
HCT VFR BLD AUTO: 48.4 % (ref 34.8–46.1)
HGB BLD-MCNC: 15.7 G/DL (ref 11.5–15.4)
IMM GRANULOCYTES # BLD AUTO: 0.06 THOUSAND/UL (ref 0–0.2)
IMM GRANULOCYTES NFR BLD AUTO: 0 % (ref 0–2)
INR PPP: 1.22 (ref 0.84–1.19)
LYMPHOCYTES # BLD AUTO: 2.23 THOUSANDS/ΜL (ref 0.6–4.47)
LYMPHOCYTES NFR BLD AUTO: 17 % (ref 14–44)
MCH RBC QN AUTO: 33.6 PG (ref 26.8–34.3)
MCHC RBC AUTO-ENTMCNC: 32.4 G/DL (ref 31.4–37.4)
MCV RBC AUTO: 104 FL (ref 82–98)
MONOCYTES # BLD AUTO: 1.2 THOUSAND/ΜL (ref 0.17–1.22)
MONOCYTES NFR BLD AUTO: 9 % (ref 4–12)
NEUTROPHILS # BLD AUTO: 9.81 THOUSANDS/ΜL (ref 1.85–7.62)
NEUTS SEG NFR BLD AUTO: 72 % (ref 43–75)
NRBC BLD AUTO-RTO: 0 /100 WBCS
NT-PROBNP SERPL-MCNC: 2419 PG/ML
P AXIS: 68 DEGREES
PLATELET # BLD AUTO: 109 THOUSANDS/UL (ref 149–390)
PMV BLD AUTO: 11.4 FL (ref 8.9–12.7)
POTASSIUM SERPL-SCNC: 4.4 MMOL/L (ref 3.5–5.3)
PR INTERVAL: 154 MS
PROTHROMBIN TIME: 15.6 SECONDS (ref 11.6–14.5)
QRS AXIS: 259 DEGREES
QRSD INTERVAL: 90 MS
QT INTERVAL: 336 MS
QTC INTERVAL: 402 MS
RBC # BLD AUTO: 4.67 MILLION/UL (ref 3.81–5.12)
SODIUM SERPL-SCNC: 144 MMOL/L (ref 136–145)
T WAVE AXIS: -9 DEGREES
TROPONIN I SERPL-MCNC: 0.03 NG/ML
VENTRICULAR RATE: 86 BPM
WBC # BLD AUTO: 13.45 THOUSAND/UL (ref 4.31–10.16)

## 2020-01-21 PROCEDURE — 85730 THROMBOPLASTIN TIME PARTIAL: CPT | Performed by: EMERGENCY MEDICINE

## 2020-01-21 PROCEDURE — 71046 X-RAY EXAM CHEST 2 VIEWS: CPT

## 2020-01-21 PROCEDURE — 82948 REAGENT STRIP/BLOOD GLUCOSE: CPT

## 2020-01-21 PROCEDURE — 99285 EMERGENCY DEPT VISIT HI MDM: CPT

## 2020-01-21 PROCEDURE — 93010 ELECTROCARDIOGRAM REPORT: CPT | Performed by: INTERNAL MEDICINE

## 2020-01-21 PROCEDURE — 71275 CT ANGIOGRAPHY CHEST: CPT

## 2020-01-21 PROCEDURE — 94640 AIRWAY INHALATION TREATMENT: CPT

## 2020-01-21 PROCEDURE — 85379 FIBRIN DEGRADATION QUANT: CPT | Performed by: EMERGENCY MEDICINE

## 2020-01-21 PROCEDURE — 85025 COMPLETE CBC W/AUTO DIFF WBC: CPT | Performed by: EMERGENCY MEDICINE

## 2020-01-21 PROCEDURE — 83036 HEMOGLOBIN GLYCOSYLATED A1C: CPT | Performed by: HOSPITALIST

## 2020-01-21 PROCEDURE — 99223 1ST HOSP IP/OBS HIGH 75: CPT | Performed by: HOSPITALIST

## 2020-01-21 PROCEDURE — 99291 CRITICAL CARE FIRST HOUR: CPT | Performed by: EMERGENCY MEDICINE

## 2020-01-21 PROCEDURE — 80048 BASIC METABOLIC PNL TOTAL CA: CPT | Performed by: EMERGENCY MEDICINE

## 2020-01-21 PROCEDURE — 84484 ASSAY OF TROPONIN QUANT: CPT | Performed by: EMERGENCY MEDICINE

## 2020-01-21 PROCEDURE — 93005 ELECTROCARDIOGRAM TRACING: CPT

## 2020-01-21 PROCEDURE — 94762 N-INVAS EAR/PLS OXIMTRY CONT: CPT

## 2020-01-21 PROCEDURE — 83880 ASSAY OF NATRIURETIC PEPTIDE: CPT | Performed by: EMERGENCY MEDICINE

## 2020-01-21 PROCEDURE — 36415 COLL VENOUS BLD VENIPUNCTURE: CPT | Performed by: EMERGENCY MEDICINE

## 2020-01-21 PROCEDURE — 85610 PROTHROMBIN TIME: CPT | Performed by: EMERGENCY MEDICINE

## 2020-01-21 RX ORDER — HEPARIN SODIUM 1000 [USP'U]/ML
5000 INJECTION, SOLUTION INTRAVENOUS; SUBCUTANEOUS AS NEEDED
Status: DISCONTINUED | OUTPATIENT
Start: 2020-01-21 | End: 2020-01-22 | Stop reason: HOSPADM

## 2020-01-21 RX ORDER — CALCIUM CARBONATE 500(1250)
1 TABLET ORAL
Status: DISCONTINUED | OUTPATIENT
Start: 2020-01-22 | End: 2020-01-22 | Stop reason: HOSPADM

## 2020-01-21 RX ORDER — HYDROCHLOROTHIAZIDE 25 MG/1
50 TABLET ORAL DAILY
Status: DISCONTINUED | OUTPATIENT
Start: 2020-01-22 | End: 2020-01-22 | Stop reason: HOSPADM

## 2020-01-21 RX ORDER — HEPARIN SODIUM 1000 [USP'U]/ML
10000 INJECTION, SOLUTION INTRAVENOUS; SUBCUTANEOUS AS NEEDED
Status: DISCONTINUED | OUTPATIENT
Start: 2020-01-21 | End: 2020-01-22 | Stop reason: HOSPADM

## 2020-01-21 RX ORDER — POTASSIUM CHLORIDE 750 MG/1
10 TABLET, EXTENDED RELEASE ORAL ONCE
Status: DISCONTINUED | OUTPATIENT
Start: 2020-01-21 | End: 2020-01-22 | Stop reason: HOSPADM

## 2020-01-21 RX ORDER — ZINC GLUCONATE 50 MG
50 TABLET ORAL DAILY
Status: DISCONTINUED | OUTPATIENT
Start: 2020-01-22 | End: 2020-01-22 | Stop reason: HOSPADM

## 2020-01-21 RX ORDER — ACETAMINOPHEN 325 MG/1
650 TABLET ORAL EVERY 6 HOURS PRN
Status: DISCONTINUED | OUTPATIENT
Start: 2020-01-21 | End: 2020-01-22 | Stop reason: HOSPADM

## 2020-01-21 RX ORDER — ALBUTEROL SULFATE 2.5 MG/3ML
5 SOLUTION RESPIRATORY (INHALATION) ONCE
Status: COMPLETED | OUTPATIENT
Start: 2020-01-21 | End: 2020-01-21

## 2020-01-21 RX ORDER — HEPARIN SODIUM 1000 [USP'U]/ML
10000 INJECTION, SOLUTION INTRAVENOUS; SUBCUTANEOUS ONCE
Status: COMPLETED | OUTPATIENT
Start: 2020-01-21 | End: 2020-01-21

## 2020-01-21 RX ORDER — HEPARIN SODIUM 10000 [USP'U]/100ML
3-30 INJECTION, SOLUTION INTRAVENOUS
Status: DISCONTINUED | OUTPATIENT
Start: 2020-01-21 | End: 2020-01-22 | Stop reason: HOSPADM

## 2020-01-21 RX ORDER — ASCORBIC ACID 500 MG
500 TABLET ORAL DAILY
Status: DISCONTINUED | OUTPATIENT
Start: 2020-01-22 | End: 2020-01-22 | Stop reason: HOSPADM

## 2020-01-21 RX ORDER — KRILL/OM-3/DHA/EPA/PHOSPHO/AST 500MG-86MG
CAPSULE ORAL DAILY
Status: DISCONTINUED | OUTPATIENT
Start: 2020-01-22 | End: 2020-01-21 | Stop reason: RX

## 2020-01-21 RX ADMIN — IOHEXOL 100 ML: 350 INJECTION, SOLUTION INTRAVENOUS at 14:51

## 2020-01-21 RX ADMIN — ALBUTEROL SULFATE 5 MG: 2.5 SOLUTION RESPIRATORY (INHALATION) at 13:04

## 2020-01-21 RX ADMIN — HEPARIN SODIUM 10000 UNITS: 1000 INJECTION, SOLUTION INTRAVENOUS; SUBCUTANEOUS at 16:11

## 2020-01-21 RX ADMIN — HEPARIN SODIUM AND DEXTROSE 18 UNITS/KG/HR: 10000; 5 INJECTION INTRAVENOUS at 16:11

## 2020-01-21 NOTE — ED PROVIDER NOTES
History  Chief Complaint   Patient presents with    Shortness of Breath     Patient c/o shortness of breath at rest and w/ exertion for the past 4 days  Reports recent sx to L foot on 12/16  Denies chest pain  History provided by:  Patient   used: No    Medical Problem - Major   Location:  Dyspnea with exertion  Severity:  Severe  Onset quality:  Gradual  Duration:  4 days  Timing:  Intermittent  Progression:  Worsening  Chronicity:  New  Context:  Recent foot surgery mid December has been nonambulatory  No leg pain or swelling  No chest pain no cardiac issues  In the chart says she was a former smoker but apparently at some point the daughter came down told the nurse that she feels she still is smoking  She has been coughing but no sputum and no fever  Relieved by:  Rest  Worsened by:  Exertion  Ineffective treatments:  None tried  Associated symptoms: cough and shortness of breath    Associated symptoms: no abdominal pain, no chest pain, no fever, no nausea, no rash and no vomiting    Patient does admit to smoking last time she said was a couple of weeks ago  Prior to Admission Medications   Prescriptions Last Dose Informant Patient Reported? Taking?    Calcium Carbonate-Vit D-Min (CALCIUM 600+D PLUS MINERALS) 600-400 MG-UNIT TABS   Yes Yes   Sig: Take by mouth   Krill Oil 500 MG CAPS Not Taking at Unknown time  Yes No   Sig: Take by mouth daily   Multiple Vitamins-Minerals (MULTIVITAMIN ADULT PO)   Yes Yes   Sig: Take 1 tablet by mouth   POTASSIUM PO   Yes Yes   Sig: Take by mouth daily Dosage unknown to patient   ascorbic acid (VITAMIN C) 500 mg tablet  Self Yes Yes   Sig: Take 500 mg by mouth daily   hydrochlorothiazide (HYDRODIURIL) 50 mg tablet   Yes Yes   Sig: Take 50 mg by mouth daily     metFORMIN (GLUCOPHAGE) 500 mg tablet   Yes Yes   Sig: Take 500 mg by mouth 2 (two) times a day with meals     zinc gluconate 50 mg tablet  Self Yes Yes   Sig: Take 50 mg by mouth daily Facility-Administered Medications: None       Past Medical History:   Diagnosis Date    Colon polyp     Diabetes mellitus ([address] )     pre    Hypertension     Muscle weakness     elev enzymes    Seasonal allergies        Past Surgical History:   Procedure Laterality Date    APPENDECTOMY      AUGMENTATION BREAST      CERVICAL BIOPSY  W/ LOOP ELECTRODE EXCISION      COLONOSCOPY  11/2019    DILATION AND CURETTAGE OF UTERUS      HYSTEROSCOPY N/A 3/20/2018    Procedure: HYSTEROSCOPY;  Surgeon: Janee Lemon MD;  Location:  MAIN OR;  Service: Gynecology Oncology    ORIF FOOT FRACTURE Left 12/16/2019    Procedure: ORIF LISFRANC;  Surgeon: Twan Lopez DPM;  Location: [address] [place] [address] MAIN OR;  Service: Podiatry    DE DILATION/CURETTAGE,DIAGNOSTIC N/A 3/20/2018    Procedure: DILATATION AND CURETTAGE (D&C),HYSTEROSCOPY;  Surgeon: Janee Lemon MD;  Location: [facility];  Service: Gynecology Oncology    TUBAL LIGATION      TUBAL REANASTOMOSIS         Family History   Problem Relation Age of Onset    Cancer Maternal Aunt         BLADDER    Colon polyps Family     Glaucoma Father     Peripheral vascular disease Father      I have reviewed and agree with the history as documented  Social History     Tobacco Use    Smoking status: Former Smoker     Types: Cigarettes    Smokeless tobacco: Never Used    Tobacco comment: QUIT 2010   Substance Use Topics    Alcohol use: Yes     Alcohol/week: 8 0 standard drinks     Types: 7 Glasses of wine, 1 Standard drinks or equivalent per week    Drug use: No        Review of Systems   Constitutional: Negative for chills and fever  Respiratory: Positive for cough and shortness of breath  Negative for chest tightness  Cardiovascular: Negative for chest pain and leg swelling  Gastrointestinal: Negative for abdominal pain, nausea and vomiting  Musculoskeletal: Positive for gait problem  Negative for back pain and joint swelling     Skin: Negative for color change and rash    All other systems reviewed and are negative  Physical Exam  Physical Exam   Constitutional: She appears well-developed and well-nourished  She is cooperative  Non-toxic appearance  She does not have a sickly appearance  She does not appear ill  No distress  Patient is obese  HENT:   Head: Normocephalic and atraumatic  Right Ear: Hearing normal    Left Ear: Hearing normal    Mouth/Throat: Mucous membranes are normal    Eyes: Conjunctivae and lids are normal  Right eye exhibits no discharge  Left eye exhibits no discharge  Neck: Trachea normal and normal range of motion  Cardiovascular: Normal rate, regular rhythm, normal heart sounds, intact distal pulses and normal pulses  Exam reveals no gallop and no friction rub  No murmur heard  Pulmonary/Chest: Effort normal  No stridor  No respiratory distress  She has wheezes  She has no rales  Some mild wheezing with forced expiration  Abdominal: Soft  Normal appearance  There is no tenderness  There is no rebound, no guarding and no CVA tenderness  Musculoskeletal: She exhibits no edema, tenderness or deformity  She has a walking boot on the left lower extremity  No calf tenderness  Neurological: She is alert  She has normal strength  No sensory deficit  She exhibits normal muscle tone  GCS eye subscore is 4  GCS verbal subscore is 5  GCS motor subscore is 6  Skin: Skin is warm, dry and intact  No rash noted  She is not diaphoretic  No pallor  Psychiatric: She has a normal mood and affect  Her speech is normal  Cognition and memory are normal    Nursing note and vitals reviewed        Vital Signs  ED Triage Vitals [01/21/20 1232]   Temperature Pulse Respirations Blood Pressure SpO2   (!) 97 4 °F (36 3 °C) 88 22 142/88 (!) 89 %      Temp Source Heart Rate Source Patient Position - Orthostatic VS BP Location FiO2 (%)   Temporal Monitor Sitting Left arm --      Pain Score       No Pain           Vitals:    01/21/20 1232 01/21/20 1324 01/21/20 1436   BP: 142/88 119/74 140/74   Pulse: 88 83 85   Patient Position - Orthostatic VS: Sitting Lying Lying         Visual Acuity      ED Medications  Medications   heparin (porcine) injection 10,000 Units (has no administration in time range)   heparin (porcine) 25,000 units in 250 mL infusion (premix) (has no administration in time range)   heparin (porcine) injection 10,000 Units (has no administration in time range)   heparin (porcine) injection 5,000 Units (has no administration in time range)   albuterol inhalation solution 5 mg (5 mg Nebulization Given 1/21/20 1304)   iohexol (OMNIPAQUE) 350 MG/ML injection (MULTI-DOSE) 100 mL (100 mL Intravenous Given 1/21/20 1451)       Diagnostic Studies  Results Reviewed     Procedure Component Value Units Date/Time    APTT [[id number]]     Lab Status:  No result Specimen:  Blood     D-Dimer [[id number]]  (Abnormal) Collected:  01/21/20 1247    Lab Status:  Final result Specimen:  Blood from Arm, Right Updated:  01/21/20 1420     D-Dimer, Quant >10 00 ug/ml FEU     Basic metabolic panel [[id number]] Collected:  01/21/20 1247    Lab Status:  Final result Specimen:  Blood from Arm, Right Updated:  01/21/20 1337     Sodium 144 mmol/L      Potassium 4 4 mmol/L      Chloride 103 mmol/L      CO2 32 mmol/L      ANION GAP 9 mmol/L      BUN 25 mg/dL      Creatinine 1 09 mg/dL      Glucose 117 mg/dL      Calcium 9 3 mg/dL      eGFR 55 ml/min/1 73sq m     Narrative:       [place] guidelines for Chronic Kidney Disease (CKD):     Stage 1 with normal or high GFR (GFR > 90 mL/min/1 73 square meters)    Stage 2 Mild CKD (GFR = 60-89 mL/min/1 73 square meters)    Stage 3A Moderate CKD (GFR = 45-59 mL/min/1 73 square meters)    Stage 3B Moderate CKD (GFR = 30-44 mL/min/1 73 square meters)    Stage 4 Severe CKD (GFR = 15-29 mL/min/1 73 square meters)    Stage 5 End Stage CKD (GFR <15 mL/min/1 73 square meters)  Note: GFR calculation is accurate only with a steady state creatinine    NT-BNP PRO [[id number]]  (Abnormal) Collected:  01/21/20 1247    Lab Status:  Final result Specimen:  Blood from Arm, Right Updated:  01/21/20 1337     NT-proBNP 2,419 pg/mL     Troponin I [[id number]]  (Normal) Collected:  01/21/20 1247    Lab Status:  Final result Specimen:  Blood from Arm, Right Updated:  01/21/20 1334     Troponin I 0 03 ng/mL     Protime-INR [[id number]]  (Abnormal) Collected:  01/21/20 1247    Lab Status:  Final result Specimen:  Blood from Arm, Right Updated:  01/21/20 1325     Protime 15 6 seconds      INR 1 22    APTT [[id number]]  (Normal) Collected:  01/21/20 1247    Lab Status:  Final result Specimen:  Blood from Arm, Right Updated:  01/21/20 1325     PTT 27 seconds     CBC and differential [[id number]]  (Abnormal) Collected:  01/21/20 1247    Lab Status:  Final result Specimen:  Blood from Arm, Right Updated:  01/21/20 1310     WBC 13 45 Thousand/uL      RBC 4 67 Million/uL      Hemoglobin 15 7 g/dL      Hematocrit 48 4 %       fL      MCH 33 6 pg      MCHC 32 4 g/dL      RDW 13 0 %      MPV 11 4 fL      Platelets 133 Thousands/uL      nRBC 0 /100 WBCs      Neutrophils Relative 72 %      Immat GRANS % 0 %      Lymphocytes Relative 17 %      Monocytes Relative 9 %      Eosinophils Relative 1 %      Basophils Relative 1 %      Neutrophils Absolute 9 81 Thousands/µL      Immature Grans Absolute 0 06 Thousand/uL      Lymphocytes Absolute 2 23 Thousands/µL      Monocytes Absolute 1 20 Thousand/µL      Eosinophils Absolute 0 08 Thousand/µL      Basophils Absolute 0 07 Thousands/µL                  CTA ED chest PE study   Final Result by Maria Guadalupe Perkins MD (01/21 1516)      Large bilateral central pulmonary emboli  The calculated ratio of right ventricular to left ventricular diameter (RV/LV ratio) is 1 66  This is greater than 0 9, which is abnormal and indicates right heart strain   An abnormal RV/LV ratio has been shown to be associated with an increased risk of 30 day mortality in the setting of acute pulmonary embolism  Urgent consultation with the    medical critical care team is recommended  I personally discussed this study with Cher Henderson on 1/21/2020 at 3:12 PM                      Workstation performed: [id number]         XR chest 2 views   Final Result by Dianna Garcia MD (01/21 3073)      No acute cardiopulmonary disease  Prominence of the right mediastinal border felt likely related to rotation, consider repeat exam for confirmation  Workstation performed: [id number]                    Procedures  ECG 12 Lead Documentation Only  Date/Time: 1/21/2020 1:22 PM  Performed by: Vladimir Mayfield MD  Authorized by: Vladimir Mayfield MD     Indications / Diagnosis:  Dyspnea  ECG reviewed by me, the ED Provider: yes    Patient location:  ED  Interpretation:     Interpretation: non-specific    Rate:     ECG rate:  86    ECG rate assessment: normal    Rhythm:     Rhythm: sinus rhythm    Ectopy:     Ectopy: none    QRS:     QRS axis:  Right    QRS intervals:  Normal  Conduction:     Conduction: normal    ST segments:     ST segments:  Normal  T waves:     T waves: non-specific      CriticalCare Time  Performed by: Vladimir Mayfield MD  Authorized by: Vladimir Mayfield MD     Critical care provider statement:     Critical care time (minutes):  40    Critical care time was exclusive of:  Separately billable procedures and treating other patients and teaching time    Critical care was necessary to treat or prevent imminent or life-threatening deterioration of the following conditions: Hypoxia, pulmonary embolism with right heart strain  Heparin drip and oxygen      Critical care was time spent personally by me on the following activities:  Obtaining history from patient or surrogate, development of treatment plan with patient or surrogate, discussions with consultants, evaluation of patient's response to treatment, re-evaluation of patient's condition, ordering and review of radiographic studies and ordering and review of laboratory studies    I assumed direction of critical care for this patient from another provider in my specialty: no               ED Course  ED Course as of Jan 21 1537   Tue Jan 21, 2020   1527 Case was discussed with Radiology  Patient with multiple pulmonary emboli with right heart strain  1535 Case discussed with ICU attending  Heparin iv infusion  Admitted to medicine level 2 stepdown  Patient on 2L NC at 94%, not tachycardic and blood pressure normal                    PERC Rule for PE      Most Recent Value   PERC Rule for PE   Age >=50  1 Filed at: 01/21/2020 1253   HR >=100     O2 Sat on room air < 95%     History of PE or DVT     Recent trauma or surgery     Hemoptysis     Exogenous estrogen     Unilateral leg swelling     PERC Rule for PE Results  1 Filed at: 01/21/2020 1253                Wells' Criteria for PE      Most Recent Value   Wells' Criteria for PE   Clinical signs and symptoms of DVT  0 Filed at: 01/21/2020 1253   PE is primary diagnosis or equally likely  3 Filed at: 01/21/2020 1253   HR >100  0 Filed at: 01/21/2020 1253   Immobilization at least 3 days or Surgery in the previous 4 weeks  1 5 Filed at: 01/21/2020 1253   Previous, objectively diagnosed PE or DVT  0 Filed at: 01/21/2020 1253   Hemoptysis  0 Filed at: 01/21/2020 1253   Malignancy with treatment within 6 months or palliative  0 Filed at: 01/21/2020 1253   Wells' Criteria Total  4 5 Filed at: 01/21/2020 1253            MDM  Number of Diagnoses or Management Options  Hypoxia:   PE (pulmonary thromboembolism) ([facility] [address] ):   Diagnosis management comments: X-ray to rule out pneumonia  Will give albuterol as is possible she could have bronchitis  She does have some risk factors for pulmonary embolism given the recent surgery    She will get a chest x-ray an EKG laboratory studies including a D-dimer  She may need a chest CT to rule out the possibility of PE  On arrival the patient was visibly dyspneic and hypoxic was placed on oxygen with an oxygen saturation on 2 L of 95%  Suspected BNP is elevated due to right heart strain as there is no evidence of fluid overload on the x-ray or the chest CT  Amount and/or Complexity of Data Reviewed  Clinical lab tests: ordered and reviewed  Tests in the radiology section of CPT®: ordered and reviewed  Tests in the medicine section of CPT®: ordered and reviewed  Discuss the patient with other providers: yes  Independent visualization of images, tracings, or specimens: yes    Patient Progress  Patient progress: stable        Disposition  Final diagnoses:   [facility]ulmonary [facility]embolism) [facility] )   Hypoxia     Time reflects when diagnosis was documented in both MDM as applicable and the Disposition within this note     Time User Action Codes Description Comment    1/21/2020  3:33 PM Leticia Ip [I26 99] PE (pulmonary thromboembolism) ([facility] )     1/21/2020  3:33 PM Maddie Thibodeaux Add [R09 02] Hypoxia       ED Disposition     ED Disposition Condition Date/Time Comment    Admit Stable Tue Jan 21, 2020  3:33 PM Case was discussed with Allison Persaud and the patient's admission status was agreed to be Admission Status: inpatient status to the service of Dr Allison Persaud   Follow-up Information    None         Patient's Medications   Discharge Prescriptions    No medications on file     No discharge procedures on file      ED Provider  Electronically Signed by           Lidia Winters MD  01/21/20 Solomon Torres MD  01/21/20 9699

## 2020-01-21 NOTE — ASSESSMENT & PLAN NOTE
No results found for: HGBA1C    No results for input(s): POCGLU in the last 72 hours      Blood Sugar Average: Last 72 hrs:    -check A1c  -continue metformin  -sliding scale insulin  -diabetic diet

## 2020-01-21 NOTE — ED NOTES
Pt short of breath getting out of w/c and into bed, sob continues as patient tries to talk  Patient oxygen saturation 87-88% on room air and patient working to breath  Patient placed on 3L via nasal cannula       Vannesa Laurent RN  01/21/20 6367

## 2020-01-21 NOTE — ASSESSMENT & PLAN NOTE
-heparin gtt for now  -currently hemodynamically stable  -likely due to recent left foot surgery on 12/16/2019  -c/s pulm

## 2020-01-21 NOTE — H&P
H&P- Patrica Lopez 1960, 62 y o  female MRN: [id number]    Unit/Bed#: ED 08 Encounter: [id number]    Primary Care Provider: Misael Mcgarry MD   Date and time admitted to hospital: 1/21/2020 12:39 PM        Morbid obesity due to excess calories ([address] )  Assessment & Plan  -encourage wt loss    Essential hypertension  Assessment & Plan  -continue hydrochlorothiazide    Type 2 diabetes mellitus without complication, without long-term current use of insulin (HCC)  Assessment & Plan  No results found for: HGBA1C    No results for input(s): POCGLU in the last 72 hours  Blood Sugar Average: Last 72 hrs:    -check A1c  -continue metformin  -sliding scale insulin  -diabetic diet    * Acute pulmonary embolism with acute cor pulmonale (HCC)  Assessment & Plan  -heparin gtt for now  -currently hemodynamically stable  -likely due to recent left foot surgery on 12/16/2019  -c/s pulm    Acute hypoxemic respiratory failure, POA, due to pulmonary embolism  Correction, hold metformin as pt received IV contrast     VTE Prophylaxis: Heparin Drip   Code Status: FULL  POLST: POLST is not applicable to this patient  Discussion with family:  Daughter-in-law at bedside  Anticipated Length of Stay:  Patient will be admitted on an Inpatient basis with an anticipated length of stay of  > 2 midnights  Justification for Hospital Stay: PE    Total Time for Visit, including Counseling / Coordination of Care: 45 minutes  Greater than 50% of this total time spent on direct patient counseling and coordination of care  Chief Complaint:   Shortness of breath    History of Present Illness:    Patrica Lopez is a 62 y o  female who presents with chief complaint of shortness of breath that started about 5 days ago  Patient also complains of anorexia and a "dry, hacking cough  "  She has no other acute complaints at this time  Patient did have surgery on her left foot for metatarsal fractures on 12/16/2019   Patient denies chest pain, palpitations, nausea, vomiting, diarrhea, abdominal pain, fevers, chills, night sweats, headache, visual disturbances, neck stiffness, new focal neurologic deficit, rash, dysuria  Review of Systems:    Review of Systems   All other systems reviewed and are negative  Past Medical and Surgical History:     Past Medical History:   Diagnosis Date    Colon polyp     Diabetes mellitus ([facility] [address] )     pre    Hypertension     Muscle weakness     elev enzymes    Seasonal allergies        Past Surgical History:   Procedure Laterality Date    APPENDECTOMY      AUGMENTATION BREAST      CERVICAL BIOPSY  W/ LOOP ELECTRODE EXCISION      COLONOSCOPY  11/2019    DILATION AND CURETTAGE OF UTERUS      HYSTEROSCOPY N/A 3/20/2018    Procedure: HYSTEROSCOPY;  Surgeon: Alireza Parikh MD;  Location:  MAIN OR;  Service: Gynecology Oncology    ORIF FOOT FRACTURE Left 12/16/2019    Procedure: ORIF LISFRANC;  Surgeon: Kathy Becerra DPM;  Location: [address] [place] [address] MAIN OR;  Service: Podiatry    [place] DILATION/CURETTAGE,DIAGNOSTIC N/A 3/20/2018    Procedure: DILATATION AND CURETTAGE (D&C),HYSTEROSCOPY;  Surgeon: Alireza Parikh MD;  Location:  MAIN OR;  Service: Gynecology Oncology    TUBAL LIGATION      TUBAL REANASTOMOSIS         Meds/Allergies:    Prior to Admission medications    Medication Sig Start Date End Date Taking?  Authorizing Provider   ascorbic acid (VITAMIN C) 500 mg tablet Take 500 mg by mouth daily   Yes Historical Provider, MD   Calcium Carbonate-Vit D-Min (CALCIUM 600+D PLUS MINERALS) 600-400 MG-UNIT TABS Take by mouth   Yes Historical Provider, MD   hydrochlorothiazide (HYDRODIURIL) 50 mg tablet Take 50 mg by mouth daily   1/2/18 1/21/20 Yes Historical Provider, MD   metFORMIN (GLUCOPHAGE) 500 mg tablet Take 500 mg by mouth 2 (two) times a day with meals   1/2/18 1/21/20 Yes Historical Provider, MD   Multiple Vitamins-Minerals (MULTIVITAMIN ADULT PO) Take 1 tablet by mouth 5/5/08  Yes Historical Provider, MD POTASSIUM PO Take by mouth daily Dosage unknown to patient   Yes Historical Provider, MD   zinc gluconate 50 mg tablet Take 50 mg by mouth daily   Yes Historical Provider, MD Torrez Willie 500 MG CAPS Take by mouth daily    Historical Provider, MD     I have reviewed home medications with patient personally  Allergies:    Allergies   Allergen Reactions    Erythromycin Base GI Intolerance    Lisinopril Cough       Social History:     Marital Status:    Substance Use History:   Social History     Substance and Sexual Activity   Alcohol Use Yes    Alcohol/week: 8 0 standard drinks    Types: 7 Glasses of wine, 1 Standard drinks or equivalent per week     Social History     Tobacco Use   Smoking Status Former Smoker    Types: Cigarettes   Smokeless Tobacco Never Used   Tobacco Comment    QUIT 2010     Social History     Substance and Sexual Activity   Drug Use No       Family History:    non-contributory    Physical Exam:     Vitals:   Blood Pressure: 147/76 (01/21/20 1541)  Pulse: 82 (01/21/20 1541)  Temperature: (!) 97 4 °F (36 3 °C) (01/21/20 1232)  Temp Source: Temporal (01/21/20 1232)  Respirations: 20 (01/21/20 1541)  Weight - Scale: (!) 155 kg (341 lb 0 8 oz) (01/21/20 1247)  SpO2: 96 % (01/21/20 1541)    Physical Exam    Gen: NAD, AAOx3, well developed, well nourished  Eyes: EOMI, PERRLA, no scleral icterus  ENMT:  Oropharynx clear of erythema or exudates, no nasal discharge, no otic discharge, moist mucous membranes  Neck:  Supple  Lymph:  No anterior or posterior cervical or supraclavicular lymphadenopathy  Cardiovascular:  Regular rate and rhythm, normal S1-S2, no murmurs, rubs, or gallops  Lungs:  Clear to auscultation bilaterally, no wheezes, or rales, or rhonchi  Abdomen:  Positive bowel sounds, soft, nontender, nondistended, no palpable organomegaly   Skin:  Intact, no obvious lesions or rashes, no edema  Neuro: Cranial nerves 2-12 are intact, non-focal, 5/5 strength in all 4 extremities      Additional Data:     Lab Results: I have personally reviewed pertinent reports  Results from last 7 days   Lab Units 01/21/20  1247   WBC Thousand/uL 13 45*   HEMOGLOBIN g/dL 15 7*   HEMATOCRIT % 48 4*   PLATELETS Thousands/uL 109*   NEUTROS PCT % 72   LYMPHS PCT % 17   MONOS PCT % 9   EOS PCT % 1     Results from last 7 days   Lab Units 01/21/20  1247   SODIUM mmol/L 144   POTASSIUM mmol/L 4 4   CHLORIDE mmol/L 103   CO2 mmol/L 32   BUN mg/dL 25   CREATININE mg/dL 1 09   ANION GAP mmol/L 9   CALCIUM mg/dL 9 3   GLUCOSE RANDOM mg/dL 117     Results from last 7 days   Lab Units 01/21/20  1247   INR  1 22*                   Imaging: I have personally reviewed pertinent reports  CTA ED chest PE study   Final Result by Gael Lynn MD (01/21 0512)      Large bilateral central pulmonary emboli  The calculated ratio of right ventricular to left ventricular diameter (RV/LV ratio) is 1 66  This is greater than 0 9, which is abnormal and indicates right heart strain  An abnormal RV/LV ratio has been shown to be associated with an increased risk of 30 day mortality in the setting of acute pulmonary embolism  Urgent consultation with the    medical critical care team is recommended  I personally discussed this study with Cher Henderson on 1/21/2020 at 3:12 PM                      Workstation performed: [id number]         XR chest 2 views   Final Result by Gael Lynn MD (01/21 0779)      No acute cardiopulmonary disease  Prominence of the right mediastinal border felt likely related to rotation, consider repeat exam for confirmation  Workstation performed: [id number]               Allscripts / Epic Records Reviewed: Yes     ** Please Note: This note has been constructed using a voice recognition system   **

## 2020-01-21 NOTE — ED NOTES
Pt oxygen saturation decreased to 88-90% on room air  Dr Tressa Laguerre made aware  Pt placed back on 2L oxygen via nasal cannula       Nathaniel Banerjee RN  01/21/20 4799

## 2020-01-21 NOTE — ED NOTES
Pt taken off oxygen at this time by Dr Angel Luis Mcdonald, [facility]  01/21/20 8889

## 2020-01-21 NOTE — ED NOTES
Report given to Prosper Hernandez on e4  Pt okay to be brought upstairs at this time       Santino Walden RN  01/21/20 2690

## 2020-01-22 ENCOUNTER — APPOINTMENT (INPATIENT)
Dept: NON INVASIVE DIAGNOSTICS | Facility: HOSPITAL | Age: 60
DRG: 175 | End: 2020-01-22
Payer: COMMERCIAL

## 2020-01-22 ENCOUNTER — HOSPITAL ENCOUNTER (INPATIENT)
Facility: HOSPITAL | Age: 60
LOS: 15 days | Discharge: NURSING, REHAB OR LONG-TERM CARE | DRG: 175 | End: 2020-02-06
Attending: GENERAL PRACTICE | Admitting: EMERGENCY MEDICINE
Payer: COMMERCIAL

## 2020-01-22 VITALS
HEIGHT: 68 IN | SYSTOLIC BLOOD PRESSURE: 118 MMHG | OXYGEN SATURATION: 90 % | WEIGHT: 293 LBS | BODY MASS INDEX: 44.41 KG/M2 | DIASTOLIC BLOOD PRESSURE: 86 MMHG | HEART RATE: 86 BPM | TEMPERATURE: 97.9 F | RESPIRATION RATE: 20 BRPM

## 2020-01-22 DIAGNOSIS — E11.9 TYPE 2 DIABETES MELLITUS WITHOUT COMPLICATION, WITHOUT LONG-TERM CURRENT USE OF INSULIN (HCC): ICD-10-CM

## 2020-01-22 DIAGNOSIS — Z98.890 STATUS POST LEFT FOOT SURGERY: ICD-10-CM

## 2020-01-22 DIAGNOSIS — J96.01 ACUTE RESPIRATORY FAILURE WITH HYPOXIA (HCC): ICD-10-CM

## 2020-01-22 DIAGNOSIS — I26.09 ACUTE PULMONARY EMBOLISM WITH ACUTE COR PULMONALE, UNSPECIFIED PULMONARY EMBOLISM TYPE (HCC): Primary | ICD-10-CM

## 2020-01-22 DIAGNOSIS — E66.01 MORBID OBESITY WITH BMI OF 50.0-59.9, ADULT (HCC): ICD-10-CM

## 2020-01-22 PROBLEM — I27.20 PULMONARY HYPERTENSION (HCC): Status: ACTIVE | Noted: 2020-01-22

## 2020-01-22 LAB
ANION GAP SERPL CALCULATED.3IONS-SCNC: 13 MMOL/L (ref 4–13)
APTT PPP: 55 SECONDS (ref 23–37)
APTT PPP: 69 SECONDS (ref 23–37)
APTT PPP: 96 SECONDS (ref 23–37)
BUN SERPL-MCNC: 18 MG/DL (ref 5–25)
CALCIUM SERPL-MCNC: 8.9 MG/DL (ref 8.3–10.1)
CHLORIDE SERPL-SCNC: 102 MMOL/L (ref 100–108)
CO2 SERPL-SCNC: 27 MMOL/L (ref 21–32)
CREAT SERPL-MCNC: 0.89 MG/DL (ref 0.6–1.3)
ERYTHROCYTE [DISTWIDTH] IN BLOOD BY AUTOMATED COUNT: 13.1 % (ref 11.6–15.1)
EST. AVERAGE GLUCOSE BLD GHB EST-MCNC: 146 MG/DL
GFR SERPL CREATININE-BSD FRML MDRD: 70 ML/MIN/1.73SQ M
GLUCOSE SERPL-MCNC: 106 MG/DL (ref 65–140)
GLUCOSE SERPL-MCNC: 107 MG/DL (ref 65–140)
GLUCOSE SERPL-MCNC: 108 MG/DL (ref 65–140)
GLUCOSE SERPL-MCNC: 109 MG/DL (ref 65–140)
GLUCOSE SERPL-MCNC: 119 MG/DL (ref 65–140)
GLUCOSE SERPL-MCNC: 123 MG/DL (ref 65–140)
GLUCOSE SERPL-MCNC: 377 MG/DL (ref 65–140)
HBA1C MFR BLD: 6.7 % (ref 4.2–6.3)
HCT VFR BLD AUTO: 44.9 % (ref 34.8–46.1)
HGB BLD-MCNC: 14.7 G/DL (ref 11.5–15.4)
MCH RBC QN AUTO: 33.4 PG (ref 26.8–34.3)
MCHC RBC AUTO-ENTMCNC: 32.7 G/DL (ref 31.4–37.4)
MCV RBC AUTO: 102 FL (ref 82–98)
PLATELET # BLD AUTO: 100 THOUSANDS/UL (ref 149–390)
PMV BLD AUTO: 11.3 FL (ref 8.9–12.7)
POTASSIUM SERPL-SCNC: 3.5 MMOL/L (ref 3.5–5.3)
RBC # BLD AUTO: 4.4 MILLION/UL (ref 3.81–5.12)
SODIUM SERPL-SCNC: 142 MMOL/L (ref 136–145)
WBC # BLD AUTO: 11.79 THOUSAND/UL (ref 4.31–10.16)

## 2020-01-22 PROCEDURE — 94762 N-INVAS EAR/PLS OXIMTRY CONT: CPT

## 2020-01-22 PROCEDURE — 99255 IP/OBS CONSLTJ NEW/EST HI 80: CPT | Performed by: INTERNAL MEDICINE

## 2020-01-22 PROCEDURE — 93970 EXTREMITY STUDY: CPT | Performed by: SURGERY

## 2020-01-22 PROCEDURE — 93306 TTE W/DOPPLER COMPLETE: CPT

## 2020-01-22 PROCEDURE — 99239 HOSP IP/OBS DSCHRG MGMT >30: CPT | Performed by: INTERNAL MEDICINE

## 2020-01-22 PROCEDURE — 85730 THROMBOPLASTIN TIME PARTIAL: CPT | Performed by: INTERNAL MEDICINE

## 2020-01-22 PROCEDURE — 80048 BASIC METABOLIC PNL TOTAL CA: CPT | Performed by: HOSPITALIST

## 2020-01-22 PROCEDURE — 85027 COMPLETE CBC AUTOMATED: CPT | Performed by: HOSPITALIST

## 2020-01-22 PROCEDURE — 99253 IP/OBS CNSLTJ NEW/EST LOW 45: CPT | Performed by: RADIOLOGY

## 2020-01-22 PROCEDURE — 82948 REAGENT STRIP/BLOOD GLUCOSE: CPT

## 2020-01-22 PROCEDURE — 85730 THROMBOPLASTIN TIME PARTIAL: CPT | Performed by: HOSPITALIST

## 2020-01-22 PROCEDURE — 93970 EXTREMITY STUDY: CPT

## 2020-01-22 PROCEDURE — 99223 1ST HOSP IP/OBS HIGH 75: CPT | Performed by: GENERAL PRACTICE

## 2020-01-22 PROCEDURE — 94760 N-INVAS EAR/PLS OXIMETRY 1: CPT

## 2020-01-22 RX ORDER — CALCIUM CARBONATE 500(1250)
1 TABLET ORAL
Status: CANCELLED | OUTPATIENT
Start: 2020-01-23

## 2020-01-22 RX ORDER — HEPARIN SODIUM 1000 [USP'U]/ML
5000 INJECTION, SOLUTION INTRAVENOUS; SUBCUTANEOUS AS NEEDED
Status: CANCELLED | OUTPATIENT
Start: 2020-01-22

## 2020-01-22 RX ORDER — ZINC GLUCONATE 50 MG
50 TABLET ORAL DAILY
Status: CANCELLED | OUTPATIENT
Start: 2020-01-23

## 2020-01-22 RX ORDER — ACETAMINOPHEN 325 MG/1
650 TABLET ORAL EVERY 6 HOURS PRN
Status: DISCONTINUED | OUTPATIENT
Start: 2020-01-22 | End: 2020-02-06 | Stop reason: HOSPADM

## 2020-01-22 RX ORDER — HEPARIN SODIUM 10000 [USP'U]/100ML
3-30 INJECTION, SOLUTION INTRAVENOUS
Status: DISCONTINUED | OUTPATIENT
Start: 2020-01-22 | End: 2020-01-23

## 2020-01-22 RX ORDER — ZINC GLUCONATE 50 MG
50 TABLET ORAL DAILY
Status: DISCONTINUED | OUTPATIENT
Start: 2020-01-23 | End: 2020-02-06 | Stop reason: HOSPADM

## 2020-01-22 RX ORDER — HEPARIN SODIUM 10000 [USP'U]/100ML
3-30 INJECTION, SOLUTION INTRAVENOUS
Status: CANCELLED | OUTPATIENT
Start: 2020-01-22

## 2020-01-22 RX ORDER — HYDROCHLOROTHIAZIDE 25 MG/1
50 TABLET ORAL DAILY
Status: CANCELLED | OUTPATIENT
Start: 2020-01-23

## 2020-01-22 RX ORDER — HEPARIN SODIUM 1000 [USP'U]/ML
5000 INJECTION, SOLUTION INTRAVENOUS; SUBCUTANEOUS AS NEEDED
Status: DISCONTINUED | OUTPATIENT
Start: 2020-01-22 | End: 2020-01-23

## 2020-01-22 RX ORDER — ACETAMINOPHEN 325 MG/1
650 TABLET ORAL EVERY 6 HOURS PRN
Status: CANCELLED | OUTPATIENT
Start: 2020-01-22

## 2020-01-22 RX ORDER — ACETAMINOPHEN 325 MG/1
650 TABLET ORAL EVERY 6 HOURS PRN
Status: DISCONTINUED | OUTPATIENT
Start: 2020-01-22 | End: 2020-01-22

## 2020-01-22 RX ORDER — CALCIUM CARBONATE 500(1250)
1 TABLET ORAL
Status: DISCONTINUED | OUTPATIENT
Start: 2020-01-23 | End: 2020-02-06 | Stop reason: HOSPADM

## 2020-01-22 RX ORDER — ASCORBIC ACID 500 MG
500 TABLET ORAL DAILY
Status: DISCONTINUED | OUTPATIENT
Start: 2020-01-23 | End: 2020-02-06 | Stop reason: HOSPADM

## 2020-01-22 RX ORDER — HEPARIN SODIUM 1000 [USP'U]/ML
10000 INJECTION, SOLUTION INTRAVENOUS; SUBCUTANEOUS AS NEEDED
Status: DISCONTINUED | OUTPATIENT
Start: 2020-01-22 | End: 2020-01-23

## 2020-01-22 RX ORDER — HEPARIN SODIUM 1000 [USP'U]/ML
10000 INJECTION, SOLUTION INTRAVENOUS; SUBCUTANEOUS AS NEEDED
Status: CANCELLED | OUTPATIENT
Start: 2020-01-22

## 2020-01-22 RX ORDER — HYDROCHLOROTHIAZIDE 25 MG/1
50 TABLET ORAL DAILY
Status: DISCONTINUED | OUTPATIENT
Start: 2020-01-23 | End: 2020-02-06 | Stop reason: HOSPADM

## 2020-01-22 RX ORDER — ASCORBIC ACID 500 MG
500 TABLET ORAL DAILY
Status: CANCELLED | OUTPATIENT
Start: 2020-01-23

## 2020-01-22 RX ADMIN — Medication 1 TABLET: at 10:44

## 2020-01-22 RX ADMIN — HYDROCHLOROTHIAZIDE 50 MG: 25 TABLET ORAL at 10:44

## 2020-01-22 RX ADMIN — Medication 50 MG: at 10:49

## 2020-01-22 RX ADMIN — HEPARIN SODIUM 5000 UNITS: 1000 INJECTION, SOLUTION INTRAVENOUS; SUBCUTANEOUS at 14:51

## 2020-01-22 RX ADMIN — HEPARIN SODIUM AND DEXTROSE 18 UNITS/KG/HR: 10000; 5 INJECTION INTRAVENOUS at 16:21

## 2020-01-22 RX ADMIN — PERFLUTREN 0.8 ML/MIN: 6.52 INJECTION, SUSPENSION INTRAVENOUS at 14:45

## 2020-01-22 RX ADMIN — OXYCODONE HYDROCHLORIDE AND ACETAMINOPHEN 500 MG: 500 TABLET ORAL at 10:44

## 2020-01-22 RX ADMIN — HEPARIN SODIUM AND DEXTROSE 16 UNITS/KG/HR: 10000; 5 INJECTION INTRAVENOUS at 23:22

## 2020-01-22 RX ADMIN — HEPARIN SODIUM AND DEXTROSE 16 UNITS/KG/HR: 10000; 5 INJECTION INTRAVENOUS at 03:45

## 2020-01-22 RX ADMIN — CALCIUM 1 TABLET: 500 TABLET ORAL at 10:44

## 2020-01-22 NOTE — ASSESSMENT & PLAN NOTE
No results found for: HGBA1C    Recent Labs     01/21/20  1905   POCGLU 134       Blood Sugar Average: Last 72 hrs:  (P) 134  -check A1c  -hold metformin as pt received IV contrast  -sliding scale insulin  -diabetic diet

## 2020-01-22 NOTE — CONSULTS
Pulmonary Consultation   Trista San 62 y o  female MRN: [id number]  Unit/Bed#: E4 -01 Encounter: [id number]      Reason for consultation:  PE    Requesting physician:  Joselyn Stewart MD    Impressions/Plan:   1  Acute hypoxic respiratory failure        *  Secondary to #2        *  Titrate supplemental oxygen as able to keep saturations greater than or equal to 88%        *  Incentive spirometry Q1hr, OOB as able, increase activity once cleared by podiatry        *  Will need formal oxygen evaluation prior to D/C as she was not on oxygen prior to admission  2  Large, central bilateral PE        *  Likely provoked from recent foot surgery and non weightbearing since that time        *  Continue heparin gtt for now and transition to NOAC that is covered by her insurance        *  Would treat for 6 months and plan to repeat CTChest at that time before making final decision to stop anticoagulation        *  Await LE dopplers        *  Await echocardiogram to r/o RV dilation  If present, would need follow up echo also in 6 months        *  Up to date on routine cancer screenings  3  Morbid obesity with presumed REBEKAH        *  Known snoring history        *  Has prescription from PCP for home sleep study which she will set up once more mobile and weight baring        *  Weight loss  4  Social tobacco use        *  Complete tobacco cessation discussed        *  No need for NRT at this time        *  If shortness of breath persists after treatment of PE, can consider PFTs/in office spirometry     -Discussed with RN at bedside  -Son updated at bedside  -Outpatient pulmonary follow up as per D/C instructions    History of Present Illness   HPI:  Trista San is a 62 y o  female who presented to the emergency room yesterday afternoon with an approximate 5 day history of worsening shortness of breath and dry cough  She also complained of mild anorexia    Of note, she underwent surgery on her left foot for metatarsal fractures on December 16, 2019 and since that time has been essentially bedbound as she is nonweightbearing  She denied chest pain, palpitations, pleuritic chest pain, nausea, vomiting, diarrhea, abdominal pain, fevers, presyncope, syncope or dizziness  D-dimer was significantly elevated at greater than 10,000  CT of the chest revealed large, central bilateral PE with a calculated RV/LV ratio of 1 66  Case was discussed with the critical care team and due to the fact that the patient was hemodynamically stable on 2 L of supplemental oxygen, it was decided she would be admitted to the medicine service with a pulmonary consultation  She was placed on heparin drip and admitted for further work up and treatment  Currently, she is feeling well unless she goes to move  She continues to have significant shortness of breath with minimal activities  She is on 2 L of supplemental oxygen which she was not using prior to her admission  She continues to deny any pain, chest pain, resting shortness of breath, fever, or bronchospasm  She has a dry cough which started approximately 5 days ago  She denies postnasal drip or uncontrolled reflux  Mrs Cosme Sheth has no underlying pulmonary history  She takes no medications from a pulmonary standpoint at home and does not use supplemental oxygen or noninvasive ventilation  She was told, after recent colonoscopy, that she snores and likely has REBEKAH  Her PCP has given her a prescription for a home sleep study which she has yet to set up  She has no occupational exposures  She has a very minimal tobacco history  She is a social smoker and typically will have 1-2 cigarettes when out with friends having a drink  Per last cigarette was Fawn Jordan  Review of systems:  12 point review of systems was completed and was otherwise negative except as listed in HPI        Historical Information   Past Medical History:   Diagnosis Date    Colon polyp     Diabetes mellitus ([facility] [address] ) pre    Hypertension     Muscle weakness     elev enzymes    Seasonal allergies      Past Surgical History:   Procedure Laterality Date    APPENDECTOMY      AUGMENTATION BREAST      CERVICAL BIOPSY  W/ LOOP ELECTRODE EXCISION      COLONOSCOPY  11/2019    DILATION AND CURETTAGE OF UTERUS      HYSTEROSCOPY N/A 3/20/2018    Procedure: HYSTEROSCOPY;  Surgeon: Rafi Talbert MD;  [facility] MAIN [facility];  Service: Gynecology Oncology    ORIF FOOT FRACTURE Left 12/16/2019    Procedure: ORIF LISFRANC;  Surgeon: Jeffrey Huertas DPM;  Location: [facility] MAIN OR;  Service: Podiatry    [place] DILATION/CURETTAGE,DIAGNOSTIC N/A 3/20/2018    Procedure: DILATATION AND CURETTAGE (D&C),HYSTEROSCOPY;  Surgeon: Rafi Talbert MD;  [facility]: [facility] MAIN OR;  Service: Gynecology Oncology    TUBAL LIGATION      TUBAL REANASTOMOSIS       Family History   Problem Relation Age of Onset    Cancer Maternal Aunt         BLADDER    Colon polyps Family     Glaucoma Father     Peripheral vascular disease Father        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  Pt works from home and describes job as "sedentary"    Tobacco history:  Social tobacco use when out drinking    Last cigarette was Fawn Nikki    Meds/Allergies   Current Facility-Administered Medications   Medication Dose Route Frequency    acetaminophen (TYLENOL) tablet 650 mg  650 mg Oral Q6H PRN    ascorbic acid (VITAMIN C) tablet 500 mg  500 mg Oral Daily    calcium carbonate (OYSTER SHELL,OSCAL) 500 mg tablet 1 tablet  1 tablet Oral Daily With Breakfast    heparin (porcine) 25,000 units in 250 mL infusion (premix)  3-30 Units/kg/hr (Order-Specific) Intravenous Titrated    heparin (porcine) injection 10,000 Units  10,000 Units Intravenous PRN    heparin (porcine) injection 5,000 Units  5,000 Units Intravenous PRN    hydrochlorothiazide (HYDRODIURIL) tablet 50 mg  50 mg Oral Daily    insulin lispro (HumaLOG) 100 units/mL subcutaneous injection 2-12 Units  2-12 Units Subcutaneous TID AC    multivitamin-minerals (CENTRUM) tablet 1 tablet  1 tablet Oral Daily    potassium chloride (K-DUR,KLOR-CON) CR tablet 10 mEq  10 mEq Oral Once    zinc gluconate tablet 50 mg  50 mg Oral Daily     Medications Prior to Admission   Medication    ascorbic acid (VITAMIN C) 500 mg tablet    Calcium Carbonate-Vit D-Min (CALCIUM 600+D PLUS MINERALS) 600-400 MG-UNIT TABS    hydrochlorothiazide (HYDRODIURIL) 50 mg tablet    metFORMIN (GLUCOPHAGE) 500 mg tablet    Multiple Vitamins-Minerals (MULTIVITAMIN ADULT PO)    POTASSIUM PO    zinc gluconate 50 mg tablet    Krill Oil 500 MG CAPS     Allergies   Allergen Reactions    Erythromycin Base GI Intolerance    Lisinopril Cough       Vitals: Blood pressure 120/73, pulse 84, temperature 98 3 °F (36 8 °C), temperature source Tympanic, resp  rate 20, height 5' 8" (1 727 m), weight (!) 154 kg (339 lb 15 2 oz), SpO2 92 %  , 2LNC, Body mass index is 51 69 kg/m²  Intake/Output Summary (Last 24 hours) at 1/22/2020 1104  Last data filed at 1/22/2020 0900  Gross per 24 hour   Intake    Output 1150 ml   Net -1150 ml       Physical exam:    General Appearance:    Alert, cooperative, no conversational dyspnea or accessory     muscle use       Head/eyes:    Normocephalic, without obvious abnormality, atraumatic,         PERRL, extraocular muscles intact, no scleral icterus    Nose:   Nares normal, septum midline, mucosa normal, no drainage    or sinus tenderness, wearing O2 via nasal cannula   Throat:   Moist mucous membranes, no thrush   Neck:   Supple, trachea midline, no adenopathy; no carotid    bruit or JVD   Lungs:     Fairly clear to auscultation bilaterally without wheezes, rhonchi or rales noted     Chest Wall:    No tenderness or deformity    Heart:    Regular rate and rhythm, S1 and S2 normal, no murmur, rub   or gallop   Abdomen:     Soft, non-tender, morbidly obese, bowel sounds active all four quadrants, no masses, no organomegaly   Extremities:   Extremities normal, atraumatic, no cyanosis or edema  Left foot in boot   Skin:   Warm, dry, turgor normal, no rashes or lesions   Lymph nodes:   Cervical and supraclavicular nodes normal   Neurologic:   CNII-XII intact, normal strength, non-focal         Labs: I have personally reviewed pertinent lab results  , ABG: No results found for: PHART, UCO3YKS, PO2ART, FBO7BPX, G9GXWMHJ, BEART, SOURCE, BNP: No results found for: BNP, CBC:   Lab Results   Component Value Date    WBC 11 79 (H) 01/22/2020    HGB 14 7 01/22/2020    HCT 44 9 01/22/2020     (H) 01/22/2020     (L) 01/22/2020    MCH 33 4 01/22/2020    MCHC 32 7 01/22/2020    RDW 13 1 01/22/2020    MPV 11 3 01/22/2020    NRBC 0 01/21/2020   , CMP:   Lab Results   Component Value Date    SODIUM 142 01/22/2020    K 3 5 01/22/2020     01/22/2020    CO2 27 01/22/2020    BUN 18 01/22/2020    CREATININE 0 89 01/22/2020    CALCIUM 8 9 01/22/2020    EGFR 70 01/22/2020   , PT/INR:   Lab Results   Component Value Date    INR 1 22 (H) 01/21/2020   , Troponin:   Lab Results   Component Value Date    TROPONINI 0 03 01/21/2020     Pro BNP - 2,419    DDimer - >10,000    Imaging and other studies: I have personally reviewed pertinent films in PACS     CTA Chest 1/21/20  Large central bilateral PE  Calculated RV/LV ratio was 1 66    LE Dopplers - PENDING    Pulmonary function testing:  No PFTs to be reviewed    EKG, Pathology, and Other Studies: I have personally reviewed pertinent reports         Echocardiogram - PENDING    Code Status: Level 1 - Full Code      Tracie Lao PA-C

## 2020-01-22 NOTE — PLAN OF CARE
Problem: Potential for Falls  Goal: Patient will remain free of falls  Description  INTERVENTIONS:  - Assess patient frequently for physical needs  -  Identify cognitive and physical deficits and behaviors that affect risk of falls    -  [place] fall precautions as indicated by assessment   - Educate patient/family on patient safety including physical limitations  - Instruct patient to call for assistance with activity based on assessment  - Modify environment to reduce risk of injury  - Consider OT/PT consult to assist with strengthening/mobility  Outcome: Progressing     Problem: Prexisting or High Potential for Compromised Skin Integrity  Goal: Skin integrity is maintained or improved  Description  INTERVENTIONS:  - Identify patients at risk for skin breakdown  - Assess and monitor skin integrity  - Assess and monitor nutrition and hydration status  - Monitor labs   - Assess for incontinence   - Turn and reposition patient  - Assist with mobility/ambulation  - Relieve pressure over bony prominences  - Avoid friction and shearing  - Provide appropriate hygiene as needed including keeping skin clean and dry  - Evaluate need for skin moisturizer/barrier cream  - Collaborate with interdisciplinary team   - Patient/family teaching  - Consider wound care consult   Outcome: Progressing     Problem: PAIN - ADULT  Goal: Verbalizes/displays adequate comfort level or baseline comfort level  Description  Interventions:  - Encourage patient to monitor pain and request assistance  - Assess pain using appropriate pain scale  - Administer analgesics based on type and severity of pain and evaluate response  - Implement non-pharmacological measures as appropriate and evaluate response  - Consider cultural and social influences on pain and pain management  - Notify physician/advanced practitioner if interventions unsuccessful or patient reports new pain  Outcome: Progressing     Problem: INFECTION - ADULT  Goal: Absence or prevention of progression during hospitalization  Description  INTERVENTIONS:  - Assess and monitor for signs and symptoms of infection  - Monitor lab/diagnostic results  - Monitor all insertion sites, i e  indwelling lines, tubes, and drains  - Monitor endotracheal if appropriate and nasal secretions for changes in amount and color  - [place] appropriate cooling/warming therapies per order  - Administer medications as ordered  - Instruct and encourage patient and family to use good hand hygiene technique  - Identify and instruct in appropriate isolation precautions for identified infection/condition  Outcome: Progressing  Goal: Absence of fever/infection during neutropenic period  Description  INTERVENTIONS:  - Monitor WBC    Outcome: Progressing     Problem: SAFETY ADULT  Goal: Maintain or return to baseline ADL function  Description  INTERVENTIONS:  -  Assess patient's ability to carry out ADLs; assess patient's baseline for ADL function and identify physical deficits which impact ability to perform ADLs (bathing, care of mouth/teeth, toileting, grooming, dressing, etc )  - Assess/evaluate cause of self-care deficits   - Assess range of motion  - Assess patient's mobility; develop plan if impaired  - Assess patient's need for assistive devices and provide as appropriate  - Encourage maximum independence but intervene and supervise when necessary  - Involve family in performance of ADLs  - Assess for home care needs following discharge   - Consider OT consult to assist with ADL evaluation and planning for discharge  - Provide patient education as appropriate  Outcome: Progressing  Goal: Maintain or return mobility status to optimal level  Description  INTERVENTIONS:  - Assess patient's baseline mobility status (ambulation, transfers, stairs, etc )    - Identify cognitive and physical deficits and behaviors that affect mobility  - Identify mobility aids required to assist with transfers and/or ambulation (gait belt, sit-to-stand, lift, walker, cane, etc )  - [place] fall precautions as indicated by assessment  - Record patient progress and toleration of activity level on Mobility SBAR; progress patient to next Phase/Stage  - Instruct patient to call for assistance with activity based on assessment  - Consider rehabilitation consult to assist with strengthening/weightbearing, etc   Outcome: Progressing     Problem: DISCHARGE PLANNING  Goal: Discharge to home or other facility with appropriate resources  Description  INTERVENTIONS:  - Identify barriers to discharge w/patient and caregiver  - Arrange for needed discharge resources and transportation as appropriate  - Identify discharge learning needs (meds, wound care, etc )  - Arrange for interpretive services to assist at discharge as needed  - Refer to Case Management Department for coordinating discharge planning if the patient needs post-hospital services based on physician/advanced practitioner order or complex needs related to functional status, cognitive ability, or social support system  Outcome: Progressing     Problem: Knowledge Deficit  Goal: Patient/family/caregiver demonstrates understanding of disease process, treatment plan, medications, and discharge instructions  Description  Complete learning assessment and assess knowledge base    Interventions:  - Provide teaching at level of understanding  - Provide teaching via preferred learning methods  Outcome: Progressing

## 2020-01-22 NOTE — PLAN OF CARE
Problem: Potential for Falls  Goal: Patient will remain free of falls  Description  INTERVENTIONS:  - Assess patient frequently for physical needs  -  Identify cognitive and physical deficits and behaviors that affect risk of falls    -  [place] fall precautions as indicated by assessment   - Educate patient/family on patient safety including physical limitations  - Instruct patient to call for assistance with activity based on assessment  - Modify environment to reduce risk of injury  - Consider OT/PT consult to assist with strengthening/mobility  1/22/2020 0741 by Tiffanie Rubio RN  Outcome: Progressing  1/22/2020 0741 by Tiffanie Rubio RN  Outcome: Progressing  1/22/2020 0740 by Tiffanie Rubio RN  Outcome: Progressing     Problem: Prexisting or High Potential for Compromised Skin Integrity  Goal: Skin integrity is maintained or improved  Description  INTERVENTIONS:  - Identify patients at risk for skin breakdown  - Assess and monitor skin integrity  - Assess and monitor nutrition and hydration status  - Monitor labs   - Assess for incontinence   - Turn and reposition patient  - Assist with mobility/ambulation  - Relieve pressure over bony prominences  - Avoid friction and shearing  - Provide appropriate hygiene as needed including keeping skin clean and dry  - Evaluate need for skin moisturizer/barrier cream  - Collaborate with interdisciplinary team   - Patient/family teaching  - Consider wound care consult   1/22/2020 0741 by Tiffanie Rubio RN  Outcome: Progressing  1/22/2020 0741 by Tiffanie Rubio RN  Outcome: Progressing  1/22/2020 0740 by Tiffanie Rubio RN  Outcome: Progressing     Problem: PAIN - ADULT  Goal: Verbalizes/displays adequate comfort level or baseline comfort level  Description  Interventions:  - Encourage patient to monitor pain and request assistance  - Assess pain using appropriate pain scale  - Administer analgesics based on type and severity of pain and evaluate response  - Implement non-pharmacological measures as appropriate and evaluate response  - Consider cultural and social influences on pain and pain management  - Notify physician/advanced practitioner if interventions unsuccessful or patient reports new pain  1/22/2020 0741 by Marina Huff RN  Outcome: Progressing  1/22/2020 0741 by Marina Huff RN  Outcome: Progressing  1/22/2020 0740 by Marina Huff RN  Outcome: Progressing     Problem: INFECTION - ADULT  Goal: Absence or prevention of progression during hospitalization  Description  INTERVENTIONS:  - Assess and monitor for signs and symptoms of infection  - Monitor lab/diagnostic results  - Monitor all insertion sites, i e  indwelling lines, tubes, and drains  - Monitor endotracheal if appropriate and nasal secretions for changes in amount and color  - [place] appropriate cooling/warming therapies per order  - Administer medications as ordered  - Instruct and encourage patient and family to use good hand hygiene technique  - Identify and instruct in appropriate isolation precautions for identified infection/condition  1/22/2020 0741 by Marina Huff RN  Outcome: Progressing  1/22/2020 0741 by Marina Huff RN  Outcome: Progressing  1/22/2020 0740 by Marina Huff RN  Outcome: Progressing  Goal: Absence of fever/infection during neutropenic period  Description  INTERVENTIONS:  - Monitor WBC    1/22/2020 0741 by Marina Huff RN  Outcome: Progressing  1/22/2020 0741 by Marina Huff RN  Outcome: Progressing  1/22/2020 0740 by Marina Huff RN  Outcome: Progressing     Problem: SAFETY ADULT  Goal: Maintain or return to baseline ADL function  Description  INTERVENTIONS:  -  Assess patient's ability to carry out ADLs; assess patient's baseline for ADL function and identify physical deficits which impact ability to perform ADLs (bathing, care of mouth/teeth, toileting, grooming, dressing, etc )  - Assess/evaluate cause of self-care deficits   - Assess range of motion  - Assess patient's mobility; develop plan if impaired  - Assess patient's need for assistive devices and provide as appropriate  - Encourage maximum independence but intervene and supervise when necessary  - Involve family in performance of ADLs  - Assess for home care needs following discharge   - Consider OT consult to assist with ADL evaluation and planning for discharge  - Provide patient education as appropriate  1/22/2020 0741 by Severa Dooms, RN  Outcome: Progressing  1/22/2020 0741 by Severa Dooms, RN  Outcome: Progressing  1/22/2020 0740 by Severa Dooms, RN  Outcome: Progressing  Goal: Maintain or return mobility status to optimal level  Description  INTERVENTIONS:  - Assess patient's baseline mobility status (ambulation, transfers, stairs, etc )    - Identify cognitive and physical deficits and behaviors that affect mobility  - Identify mobility aids required to assist with transfers and/or ambulation (gait belt, sit-to-stand, lift, walker, cane, etc )  - [place] fall precautions as indicated by assessment  - Record patient progress and toleration of activity level on Mobility SBAR; progress patient to next Phase/Stage  - Instruct patient to call for assistance with activity based on assessment  - Consider rehabilitation consult to assist with strengthening/weightbearing, etc   1/22/2020 0741 by Severa Dooms, RN  Outcome: Progressing  1/22/2020 0741 by Severa Dooms, RN  Outcome: Progressing  1/22/2020 0740 by Severa Dooms, RN  Outcome: Progressing     Problem: DISCHARGE PLANNING  Goal: Discharge to home or other facility with appropriate resources  Description  INTERVENTIONS:  - Identify barriers to discharge w/patient and caregiver  - Arrange for needed discharge resources and transportation as appropriate  - Identify discharge learning needs (meds, wound care, etc )  - Arrange for interpretive services to assist at discharge as needed  - Refer to Case Management Department for coordinating discharge planning if the patient needs post-hospital services based on physician/advanced practitioner order or complex needs related to functional status, cognitive ability, or social support system  1/22/2020 0741 by Araceli Andrews RN  Outcome: Progressing  1/22/2020 0741 by Araceli Andrews RN  Outcome: Progressing  1/22/2020 0740 by Araceli Andrwes RN  Outcome: Progressing     Problem: Knowledge Deficit  Goal: Patient/family/caregiver demonstrates understanding of disease process, treatment plan, medications, and discharge instructions  Description  Complete learning assessment and assess knowledge base    Interventions:  - Provide teaching at level of understanding  - Provide teaching via preferred learning methods  1/22/2020 0741 by Araceli Andrews RN  Outcome: Progressing  1/22/2020 0741 by Araceli Andrews RN  Outcome: Progressing  1/22/2020 0740 by Araceli Andrews RN  Outcome: Progressing

## 2020-01-22 NOTE — DISCHARGE INSTRUCTIONS
Pulmonary Embolism   WHAT YOU NEED TO KNOW:   A pulmonary embolism (PE) is the sudden blockage of a blood vessel in the lungs by an embolus  An embolus is a small piece of blood clot, fat, air, or tumor cells  The embolus cuts off the blood supply to your lungs  A pulmonary embolism can become life-threatening  DISCHARGE INSTRUCTIONS:   Call 911 for any of the following:   · You feel lightheaded, short of breath, and have chest pain  · You cough up blood  · You have a seizure  · You have slurred speech, increased sleepiness, or problems seeing, talking, or thinking  · You have weakness or cannot move your arm or leg on one side of your body  Seek care immediately if:   · You feel faint  · You have a severe headache  · Your heart is beating faster than normal   Contact your healthcare provider if:   · The skin on any part of your legs or hips turns purple  · Your gums or nose bleed  · You see blood in your urine or bowel movements  · Your bowel movements are black or darker than normal     · You have questions or concerns about your condition or care  Medicines:   · Blood thinners  help treat the PE and prevent new clots from forming  Examples of blood thinners include heparin, rivaroxaban, apixiban, and warfarin  The following are general safety guidelines to follow while you are taking a blood thinner:     ¨ Watch for bleeding and bruising  Watch for bleeding from your gums or nose  Watch for blood in your urine and bowel movements  Use a soft washcloth on your skin, and a soft toothbrush to brush your teeth  This can keep your skin and gums from bleeding  If you shave, use an electric shaver  Do not play contact sports  ¨ Tell your dentist and other healthcare providers that you take a blood thinner  Wear a bracelet or necklace that says you take this medicine  ¨ Do not start or stop any medicines unless your healthcare provider tells you to   Many medicines cannot be used with blood thinners  ¨ Tell your healthcare provider right away if you forget to take the blood thinner , or if you take too much  ¨ Warfarin  is a blood thinner that you may need to take  The following are additional things you should be aware of if you take warfarin:    § Foods and medicines can affect the amount of warfarin in your blood  Do not make major changes to your diet  Warfarin works best when you eat about the same amount of vitamin K every day  Vitamin K is found in green leafy vegetables and certain other foods  Ask for more information about what to eat or not to eat  § You will need to see your healthcare provider for follow-up visits  You will need regular blood tests to decide how much warfarin you need  · Take your medicine as directed  Contact your healthcare provider if you think your medicine is not helping or if you have side effects  Tell him or her if you are allergic to any medicine  Keep a list of the medicines, vitamins, and herbs you take  Include the amounts, and when and why you take them  Bring the list or the pill bottles to follow-up visits  Carry your medicine list with you in case of an emergency  Prevent another PE:   · Wear pressure stockings  The stockings are tight and put pressure on your legs  This improves blood flow and helps prevent clots  Wear the stockings during the day  Do not wear them when you sleep  · Exercise regularly  Ask about the best exercise plan for you  When you travel by car or work at a desk, take breaks to stand up and move around as much as possible  Rotate your feet in circles often if you sit for a long period of time  · Maintain a healthy weight  Ask your healthcare provider how much you should weigh  Ask him to help you create a weight loss plan if you are overweight  · Do not smoke  Nicotine and other chemicals in cigarettes and cigars can damage blood vessels and increase your risk for another PE   Ask your healthcare provider for information if you currently smoke and need help to quit  E-cigarettes or smokeless tobacco still contain nicotine  Talk to your healthcare provider before you use these products  Follow up with your healthcare provider as directed:  Write down your questions so you remember to ask them during your visits  © 2017 [address] [employer] Information is for End User's use only and may not be sold, redistributed or otherwise used for commercial purposes  All illustrations and images included in CareNotes® are the copyrighted property of [employer] , [employer]  or Christopher Arreola  The above information is an  only  It is not intended as medical advice for individual conditions or treatments  Talk to your doctor, nurse or pharmacist before following any medical regimen to see if it is safe and effective for you

## 2020-01-22 NOTE — CONSULTS
Consultation - Interventional Radiology  Bimal Damian 62 y o  female MRN: [id number]  Unit/Bed#: E4 -01 Encounter: [id number]        Consults     IR (via my colleagues) was contacted regarding this 59F with sub massive pulmonary embolism, provoked after foot surgery  She has been nonweightbearing for several weeks and was doing well at home including moderate physical activity using her rolling device, for example multiple load of laundry  She denies a history of leg swelling  Approximately 5 days prior to visit to the ED she noticed acute onset shortness of breath that worsened, she was not able to ambulate as usual     In the ED she was diagnosed with pulmonary embolism, with evidence of right heart strain on imaging (increased RV/LV ratio ), she was hypoxic to the 80s which responded to oxygen and was also found to have elevated BNP  Troponin was normal   In summary this is a high risk submassive pulmonary embolism by imaging and biochemical criteria  She has been seen by critical care  Initially she was managed by a heparin drip  However on ECHO today she was found to have severe right heart strain we estimated PA pressures in the 70s  Her medical history is notable for morbid obesity, recent foot surgery, diabetes/prediabetes and some hypertension  She has no personal history of thrombosis  My suspicion given her massive obesity is undiagnosed sleep apnea, on further questioning she states she was actually scheduled for a sleep study  It is likely that she has acute on subacute elevated right heart pressures  I visited the patient and spoke with her and her family, discussing options ranging from nothing, systemic anticoagulation, systemic tPA, or pulmonary embolectomy via surgery  Clearly some of these are inappropriate for her    However, catheter directed therapy may represent an option with the theoretical benefit of reducing chronic pulmonary hypertension and the immediate possible benefit of reducing symptoms and increasing quality of life  Unfortunately we do not have evidence yet to guide us regarding a mortality or long-term benefit but she does appear to be a good candidate for lower dose catheter directed tPA administration  Considerations for her include the ability to lay flat, apparently she has not tried to lay flat for at least several days  Currently when I saw her she was also not on pulse oximetry  Recommendations:  - she is currently being transferred to the [facility]  - maintain NPO  - should be assessed by critical care and interventional radiology at this [facility] to determine appropriateness and timing for any procedure  - given her clinical stability over several days it is reasonably appropriate that this be performed during daytime hours, therefore continuing heparin drip overnight is prudent  - need to assess for her ability to lay flat  - continuous pulse oximetry now in during transport (this was discussed with hospitalist team )  - please place consult IR order when arriving at [facility]      ASSESSMENT/PLAN:  Problem List     * (Principal) Acute pulmonary embolism with acute cor pulmonale ([address] )    Thickened endometrium    Overview Signed 4/4/2018  2:53 PM by Mathieu Scanlon PA-C     S/p D&C, hysteroscopy on 3/20/18  PMB (postmenopausal bleeding)    Morbid obesity with BMI of 50 0-59 9, adult (HCC)    Cervical stenosis (uterine cervix)    Postoperative state    Overview Signed 4/4/2018  2:54 PM by Mathieu Scanlon PA-C     S/p D&C, hysteroscopy on 3/20/18           Type 2 diabetes mellitus without complication, without long-term current use of insulin (HCC)    Lab Results   Component Value Date    HGBA1C 6 7 (H) 01/21/2020       Recent Labs     01/22/20  0800 01/22/20  0802 01/22/20  1110 01/22/20  1614   POCGLU 377* 123 106 108       Blood Sugar Average: Last 72 hrs:  (P) 161        Essential hypertension    Morbid obesity due to excess calories ([facility] )          Reason for Consult / Principal Problem:    HPI: Michael Zarate is a 62y o  year old female who presents with Acute pulmonary embolism with acute cor pulmonale (HCC)      Review of Systems   Constitutional:        Weakness   Respiratory:        Dyspnea         Past Medical History:  Past Medical History:   Diagnosis Date    Colon polyp     Diabetes mellitus ([facility] )     pre    Hypertension     Muscle weakness     elev enzymes    Seasonal allergies        Past Surgical History:  Past Surgical History:   Procedure Laterality Date    APPENDECTOMY      AUGMENTATION BREAST      CERVICAL BIOPSY  W/ LOOP ELECTRODE EXCISION      COLONOSCOPY  11/2019    DILATION AND CURETTAGE OF UTERUS      HYSTEROSCOPY N/A 3/20/2018    Procedure: HYSTEROSCOPY;  Surgeon: Bull Strickland MD;  Location:  MAIN OR;  Service: Gynecology Oncology    ORIF FOOT FRACTURE Left 12/16/2019    Procedure: ORIF LISFRANC;  Surgeon: Gerardo Madrid DPM;  Location: [address] [place] [address] MAIN OR;  Service: Podiatry    AL DILATION/CURETTAGE,DIAGNOSTIC N/A 3/20/2018    Procedure: DILATATION AND CURETTAGE (D&C),HYSTEROSCOPY;  Surgeon: Bull Strickland MD;  Location:  MAIN OR;  Service: Gynecology Oncology    TUBAL LIGATION      TUBAL REANASTOMOSIS         Social History:  Social History     Substance and Sexual Activity   Alcohol Use Yes    Alcohol/week: 8 0 standard drinks    Types: 7 Glasses of wine, 1 Standard drinks or equivalent per week     Social History     Substance and Sexual Activity   Drug Use No     Social History     Tobacco Use   Smoking Status Former Smoker    Types: Cigarettes   Smokeless Tobacco Never Used   Tobacco Comment    QUIT 2010       Family History:  Family History   Problem Relation Age of Onset    Cancer Maternal Aunt         BLADDER    Colon polyps Family     Glaucoma Father     Peripheral vascular disease Father        Allergies:   Allergies   Allergen Reactions    Erythromycin Base GI Intolerance    Lisinopril Cough       Medications:  Medications Prior to Admission   Medication    ascorbic acid (VITAMIN C) 500 mg tablet    Calcium Carbonate-Vit D-Min (CALCIUM 600+D PLUS MINERALS) 600-400 MG-UNIT TABS    hydrochlorothiazide (HYDRODIURIL) 50 mg tablet    metFORMIN (GLUCOPHAGE) 500 mg tablet    Multiple Vitamins-Minerals (MULTIVITAMIN ADULT PO)    POTASSIUM PO    zinc gluconate 50 mg tablet    Krill Oil 500 MG CAPS     Current Facility-Administered Medications   Medication Dose Route Frequency    acetaminophen (TYLENOL) tablet 650 mg  650 mg Oral Q6H PRN    ascorbic acid (VITAMIN C) tablet 500 mg  500 mg Oral Daily    calcium carbonate (OYSTER SHELL,OSCAL) 500 mg tablet 1 tablet  1 tablet Oral Daily With Breakfast    heparin (porcine) 25,000 units in 250 mL infusion (premix)  3-30 Units/kg/hr (Order-Specific) Intravenous Titrated    heparin (porcine) injection 10,000 Units  10,000 Units Intravenous PRN    heparin (porcine) injection 5,000 Units  5,000 Units Intravenous PRN    hydrochlorothiazide (HYDRODIURIL) tablet 50 mg  50 mg Oral Daily    insulin lispro (HumaLOG) 100 units/mL subcutaneous injection 2-12 Units  2-12 Units Subcutaneous TID AC    multivitamin-minerals (CENTRUM) tablet 1 tablet  1 tablet Oral Daily    potassium chloride (K-DUR,KLOR-CON) CR tablet 10 mEq  10 mEq Oral Once    zinc gluconate tablet 50 mg  50 mg Oral Daily       Vitals:  /95 (BP Location: Left arm)   Pulse 91   Temp 97 7 °F (36 5 °C) (Tympanic)   Resp 20   Ht 5' 8" (1 727 m)   Wt (!) 154 kg (339 lb 15 2 oz)   SpO2 94%   BMI 51 69 kg/m²   Body mass index is 51 69 kg/m²    Weight (last 2 days)     Date/Time   Weight    01/21/20 1853   (!) 154 (339 95)    01/21/20 1247   (!) 155 (341 05)    01/21/20 1232   (!) 155 (341 71)              I/Os:    Intake/Output Summary (Last 24 hours) at 1/22/2020 1826  Last data filed at 1/22/2020 0900  Gross per 24 hour   Intake    Output 1150 ml   Net -1150 ml PHYSICAL EXAM  General appearance: alert, no distress, some dyspnea on speaking  Skin: Skin color, texture, turgor normal  No rashes or lesions  Head: Normocephalic, without obvious abnormality  Heart: not on monitor  Lungs: on O2, no pulse ox  Abdomen: not examined  Neurological: normal without focal findings, mental status, speech normal, alert and oriented x3, ALMAZ and reflexes normal and symmetric    Lab Results and Cultures:   CBC with diff:   Lab Results   Component Value Date    WBC 11 79 (H) 01/22/2020    HGB 14 7 01/22/2020    HCT 44 9 01/22/2020     (H) 01/22/2020     (L) 01/22/2020    MCH 33 4 01/22/2020    MCHC 32 7 01/22/2020    RDW 13 1 01/22/2020    MPV 11 3 01/22/2020    NRBC 0 01/21/2020      BMP/CMP:  Lab Results   Component Value Date    K 3 5 01/22/2020     01/22/2020    CO2 27 01/22/2020    BUN 18 01/22/2020    CREATININE 0 89 01/22/2020    CALCIUM 8 9 01/22/2020    EGFR 70 01/22/2020   ,     Coags:   Lab Results   Component Value Date    PTT 55 (H) 01/22/2020    INR 1 22 (H) 01/21/2020   ,   Results from last 7 days   Lab Units 01/22/20  1109 01/22/20  0413 01/21/20  2153 01/21/20  1247   PTT seconds 55* 69* 111* 27   INR   --   --   --  1 22*        Lipid Panel: No results found for: CHOL  No results found for: HDL  No results found for: HDL  No results found for: LDLCALC  No results found for: TRIG    HgbA1c:   Lab Results   Component Value Date    HGBA1C 6 7 (H) 01/21/2020       Blood Culture: No results found for: BLOODCX,   Urinalysis: No results found for: Dewaine Mailman, SPECGRAV, PHUR, LEUKOCYTESUR, NITRITE, PROTEINUA, GLUCOSEU, KETONESU, BILIRUBINUR, BLOODU,   Urine Culture: No results found for: URINECX,   Wound Culure:  No results found for: WOUNDCULT    Imaging Studies: I have personally reviewed pertinent films in PACS    Counseling / Coordination of Care  Total time spent today  30 minutes   Greater than 50% of total time was spent with the patient and / or family counseling and / or coordination of care  Thank you for allowing me to participate in the care of Shruthi Greene  Please don't hesitate to call, text, email, or TigerText with any questions

## 2020-01-22 NOTE — UTILIZATION REVIEW
Initial Clinical Review    Admission: Date/Time/Statement: Inpatient Admission Orders (From admission, onward)     Ordered        01/21/20 1534  Inpatient Admission  Once                   Orders Placed This Encounter   Procedures    Inpatient Admission     Standing Status:   Standing     Number of Occurrences:   1     Order Specific Question:   Admitting Physician     Answer:   Lizzy Qureshi [[id number]]     Order Specific Question:   Level of Care     Answer:   Level 2 Stepdown / HOT [14]     Order Specific Question:   Estimated length of stay     Answer:   More than 2 Midnights     Order Specific Question:   Certification     Answer:   I certify that inpatient services are medically necessary for this patient for a duration of greater than two midnights  See H&P and MD Progress Notes for additional information about the patient's course of treatment  ED Arrival Information     Expected Arrival Acuity Means of Arrival Escorted By Service Admission Type    - 1/21/2020 12:25 Emergent Wheelchair Family Member General Medicine Emergency    Arrival Complaint    Shortness of breath         Chief Complaint   Patient presents with    Shortness of Breath     Patient c/o shortness of breath at rest and w/ exertion for the past 4 days  Reports recent sx to L foot on 12/16  Denies chest pain  Assessment/Plan:  63 yo female presented to ED with C/O SOB that started 5 days ago - also  anorexia and a "dry, hacking cough  "  Patient did have surgery on her left foot for metatarsal fractures on 12/16/2019  Visibly dyspneic and hypoxic on arrival and was placed on oxygen  - saturation on 2 L of 95%  CTA Chest + Large bilateral central pulmonary emboli  Admitted to IP with Acute PE  And Respiratory Failure  Plan Heparin Drip, continuous pulse ox,  accu checks with ssi, hold metformin, continue hydrochlorothiazide for HTN  LE Duplex    1/22  + MOORE   O2 @ 2 L NC  Per Pulm: Acute Hypoxic Resp Failure 2ndary to Large Bilateral PE  Continue Heparin, supplemental O2, ECHO pending    ED Triage Vitals [01/21/20 1232]   Temperature Pulse Respirations Blood Pressure SpO2   (!) 97 4 °F (36 3 °C) 88 22 142/88 (!) 89 %      Temp Source Heart Rate Source Patient Position - Orthostatic VS BP Location FiO2 (%)   Temporal Monitor Sitting Left arm --      Pain Score       No Pain        Wt Readings from Last 1 Encounters:   01/21/20 (!) 154 kg (339 lb 15 2 oz)     Additional Vital Signs:   01/22/20 1100   87 20 120/89   Lying   01/22/20 0758  98 3 °F (36 8 °C) 84 20 120/73 92 % Nasal cannula 2L Lying   01/22/20 0300  97 2 °F (36 2 °C)l  95 22 161/100 95 % Nasal cannula Lying   01/21/20 2100  98 8 °F (37 1 °C) 88 22 144/96 92 % Nasal cannula Lying   01/21/20 1853  100 °F (37 8 °C) 87 22 137/98 92 % Nasal cannula  Sitting   O2 Device: 2L at 01/21/20 1853   01/21/20 1715   80 20 132/67 94 % Nasal cannula Lying   01/21/20 1612   85 20 138/82 94 % Nasal cannula Lying   01/21/20 1541   82 20 147/76 96 % Nasal cannula 2L Lying   01/21/20 1436   85 22 140/74 94 % Nasal cannula 3L Lying   01/21/20 1324   83 20 119/74 94 % Nasal cannula 3L Lying       Pertinent Labs/Diagnostic Test Results:   Results from last 7 days   Lab Units 01/22/20  0422 01/21/20  1247   WBC Thousand/uL 11 79* 13 45*   HEMOGLOBIN g/dL 14 7 15 7*   HEMATOCRIT % 44 9 48 4*   PLATELETS Thousands/uL 100* 109*   NEUTROS ABS Thousands/µL  --  9 81*     Results from last 7 days   Lab Units 01/22/20  0422 01/21/20  1247   SODIUM mmol/L 142 144   POTASSIUM mmol/L 3 5 4 4   CHLORIDE mmol/L 102 103   CO2 mmol/L 27 32   ANION GAP mmol/L 13 9   BUN mg/dL 18 25   CREATININE mg/dL 0 89 1 09   EGFR ml/min/1 73sq m 70 55   CALCIUM mg/dL 8 9 9 3     Results from last 7 days   Lab Units 01/22/20  1110 01/22/20  0802 01/22/20  0800 01/21/20  2105 01/21/20  1905   POC GLUCOSE mg/dl 106 123 377* 118 134     Results from last 7 days   Lab Units 01/22/20  0422 01/21/20  1247   GLUCOSE RANDOM mg/dL 119 117 Results from last 7 days   Lab Units 01/21/20  2153   HEMOGLOBIN A1C % 6 7*   EAG mg/dl 146     Results from last 7 days   Lab Units 01/21/20  1247   TROPONIN I ng/mL 0 03     Results from last 7 days   Lab Units 01/21/20  1247   D-DIMER QUANTITATIVE ug/ml FEU >10 00*     Results from last 7 days   Lab Units 01/22/20  1109 01/22/20  0413 01/21/20  2153 01/21/20  1247   PROTIME seconds  --   --   --  15 6*   INR   --   --   --  1 22*   PTT seconds 55* 69* 111* 27     Results from last 7 days   Lab Units 01/21/20  1247   NT-PRO BNP pg/mL 2,419*     1/21 CTA Chest: Large bilateral central pulmonary emboli  The calculated ratio of right ventricular to left ventricular diameter (RV/LV ratio) is 1 66  This is greater than 0 9, which is abnormal and indicates right heart strain  An abnormal RV/LV ratio has been shown to be associated with an increased risk of 30 day mortality in the setting of acute pulmonary embolism   Urgent consultation with the medical critical care team is recommended  1/21 CXR:No acute cardiopulmonary disease  Prominence of the right mediastinal border felt likely related to rotation, consider repeat exam for confirmation    1/21 EKG: NSR    1/22 ECHO:  1/22 LE Venous Duplex:  PEnding      ED Treatment:   Medication Administration from 01/21/2020 1225 to 01/21/2020 1801       Date/Time Order Dose Route Action     01/21/2020 1304 albuterol inhalation solution 5 mg 5 mg Nebulization Given     01/21/2020 1611 heparin (porcine) injection 10,000 Units 10,000 Units Intravenous Given     01/21/2020 1611 heparin (porcine) 25,000 units in 250 mL infusion (premix) 18 Units/kg/hr Intravenous New Bag        Past Medical History:   Diagnosis Date    Colon polyp     Diabetes mellitus ([address] )     pre    Hypertension     Muscle weakness     elev enzymes    Seasonal allergies      Present on Admission:   Acute pulmonary embolism with acute cor pulmonale (HCC)   Type 2 diabetes mellitus without complication, without long-term current use of insulin ([facility] )   Essential hypertension   Morbid obesity due to excess calories ([facility] )      Admitting Diagnosis: Shortness of breath [R06 02]  PE (pulmonary thromboembolism) ([facility] ) [I26 99]  Hypoxia [R09 02]     Age/Sex: 62 y o  female  Admission Orders:  Scheduled Medications:  Medications:  ascorbic acid 500 mg Oral Daily   calcium carbonate 1 tablet Oral Daily With Breakfast   hydrochlorothiazide 50 mg Oral Daily   insulin lispro 2-12 Units Subcutaneous TID AC   multivitamin-minerals 1 tablet Oral Daily   potassium chloride 10 mEq Oral Once   zinc gluconate 50 mg Oral Daily     Continuous IV Infusions:  heparin (porcine) 3-30 Units/kg/hr (Order-Specific) Intravenous Titrated     PRN Meds:  acetaminophen 650 mg Oral Q6H PRN   heparin (porcine) 10,000 Units Intravenous PRN   heparin (porcine) 5,000 Units Intravenous PRN       IP CONSULT TO PULMONOLOGY  Bilat LE Duplex  ECHO      Network Utilization Review Department  [email or web address]  ATTENTION: Please call with any questions or concerns to [phone] and carefully listen to the prompts so that you are directed to the right person  All voicemails are confidential   Tae Los all requests for admission clinical reviews, approved or denied determinations and any other requests to dedicated fax number below belonging to the campus where the patient is receiving treatment   List of dedicated fax numbers for the Facilities:  FACILITY NAME UR FAX NUMBER   ADMISSION DENIALS (Administrative/Medical Necessity) [phone]   [address]  (Maternity/NICU/Pediatrics) [phone]   [facility] [phone]   Cesar Healy [phone]   Soo Hooks [phone]   [facility] [address] [facility] [address] [place] [address] [facility] [phone]   [address] [place] [address] [phone]

## 2020-01-23 ENCOUNTER — APPOINTMENT (INPATIENT)
Dept: RADIOLOGY | Facility: HOSPITAL | Age: 60
DRG: 175 | End: 2020-01-23
Payer: COMMERCIAL

## 2020-01-23 ENCOUNTER — APPOINTMENT (INPATIENT)
Dept: RADIOLOGY | Facility: HOSPITAL | Age: 60
DRG: 175 | End: 2020-01-23
Attending: GENERAL PRACTICE
Payer: COMMERCIAL

## 2020-01-23 ENCOUNTER — ANESTHESIA EVENT (INPATIENT)
Dept: RADIOLOGY | Facility: HOSPITAL | Age: 60
DRG: 175 | End: 2020-01-23
Payer: COMMERCIAL

## 2020-01-23 ENCOUNTER — ANESTHESIA (INPATIENT)
Dept: RADIOLOGY | Facility: HOSPITAL | Age: 60
DRG: 175 | End: 2020-01-23
Payer: COMMERCIAL

## 2020-01-23 PROBLEM — I82.409 DVT (DEEP VENOUS THROMBOSIS) (HCC): Status: ACTIVE | Noted: 2020-01-23

## 2020-01-23 LAB
ANION GAP SERPL CALCULATED.3IONS-SCNC: 5 MMOL/L (ref 4–13)
APTT PPP: 34 SECONDS (ref 23–37)
APTT PPP: 57 SECONDS (ref 23–37)
APTT PPP: 63 SECONDS (ref 23–37)
BUN SERPL-MCNC: 17 MG/DL (ref 5–25)
CALCIUM SERPL-MCNC: 8.9 MG/DL (ref 8.3–10.1)
CHLORIDE SERPL-SCNC: 104 MMOL/L (ref 100–108)
CO2 SERPL-SCNC: 31 MMOL/L (ref 21–32)
CREAT SERPL-MCNC: 0.82 MG/DL (ref 0.6–1.3)
ERYTHROCYTE [DISTWIDTH] IN BLOOD BY AUTOMATED COUNT: 13 % (ref 11.6–15.1)
ERYTHROCYTE [DISTWIDTH] IN BLOOD BY AUTOMATED COUNT: 13.1 % (ref 11.6–15.1)
ERYTHROCYTE [DISTWIDTH] IN BLOOD BY AUTOMATED COUNT: 13.2 % (ref 11.6–15.1)
FIBRINOGEN PPP-MCNC: 405 MG/DL (ref 227–495)
FIBRINOGEN PPP-MCNC: 425 MG/DL (ref 227–495)
GFR SERPL CREATININE-BSD FRML MDRD: 77 ML/MIN/1.73SQ M
GLUCOSE SERPL-MCNC: 100 MG/DL (ref 65–140)
GLUCOSE SERPL-MCNC: 102 MG/DL (ref 65–140)
GLUCOSE SERPL-MCNC: 124 MG/DL (ref 65–140)
GLUCOSE SERPL-MCNC: 126 MG/DL (ref 65–140)
GLUCOSE SERPL-MCNC: 133 MG/DL (ref 65–140)
HCT VFR BLD AUTO: 42.5 % (ref 34.8–46.1)
HCT VFR BLD AUTO: 42.9 % (ref 34.8–46.1)
HCT VFR BLD AUTO: 45 % (ref 34.8–46.1)
HGB BLD-MCNC: 13.6 G/DL (ref 11.5–15.4)
HGB BLD-MCNC: 14 G/DL (ref 11.5–15.4)
HGB BLD-MCNC: 14.4 G/DL (ref 11.5–15.4)
MAGNESIUM SERPL-MCNC: 2.4 MG/DL (ref 1.6–2.6)
MCH RBC QN AUTO: 32.8 PG (ref 26.8–34.3)
MCH RBC QN AUTO: 33.2 PG (ref 26.8–34.3)
MCH RBC QN AUTO: 33.4 PG (ref 26.8–34.3)
MCHC RBC AUTO-ENTMCNC: 32 G/DL (ref 31.4–37.4)
MCHC RBC AUTO-ENTMCNC: 32 G/DL (ref 31.4–37.4)
MCHC RBC AUTO-ENTMCNC: 32.6 G/DL (ref 31.4–37.4)
MCV RBC AUTO: 102 FL (ref 82–98)
MCV RBC AUTO: 102 FL (ref 82–98)
MCV RBC AUTO: 104 FL (ref 82–98)
PHOSPHATE SERPL-MCNC: 5.2 MG/DL (ref 2.3–4.1)
PLATELET # BLD AUTO: 101 THOUSANDS/UL (ref 149–390)
PLATELET # BLD AUTO: 107 THOUSANDS/UL (ref 149–390)
PLATELET # BLD AUTO: 122 THOUSANDS/UL (ref 149–390)
PMV BLD AUTO: 10.8 FL (ref 8.9–12.7)
PMV BLD AUTO: 10.9 FL (ref 8.9–12.7)
PMV BLD AUTO: 11.7 FL (ref 8.9–12.7)
POTASSIUM SERPL-SCNC: 4.6 MMOL/L (ref 3.5–5.3)
RBC # BLD AUTO: 4.15 MILLION/UL (ref 3.81–5.12)
RBC # BLD AUTO: 4.19 MILLION/UL (ref 3.81–5.12)
RBC # BLD AUTO: 4.34 MILLION/UL (ref 3.81–5.12)
SODIUM SERPL-SCNC: 140 MMOL/L (ref 136–145)
WBC # BLD AUTO: 11.31 THOUSAND/UL (ref 4.31–10.16)
WBC # BLD AUTO: 8.18 THOUSAND/UL (ref 4.31–10.16)
WBC # BLD AUTO: 8.64 THOUSAND/UL (ref 4.31–10.16)

## 2020-01-23 PROCEDURE — 86146 BETA-2 GLYCOPROTEIN ANTIBODY: CPT | Performed by: NURSE PRACTITIONER

## 2020-01-23 PROCEDURE — 85670 THROMBIN TIME PLASMA: CPT | Performed by: NURSE PRACTITIONER

## 2020-01-23 PROCEDURE — 36014 PLACE CATHETER IN ARTERY: CPT

## 2020-01-23 PROCEDURE — 85306 CLOT INHIBIT PROT S FREE: CPT | Performed by: NURSE PRACTITIONER

## 2020-01-23 PROCEDURE — 70450 CT HEAD/BRAIN W/O DYE: CPT

## 2020-01-23 PROCEDURE — 85305 CLOT INHIBIT PROT S TOTAL: CPT | Performed by: NURSE PRACTITIONER

## 2020-01-23 PROCEDURE — C1769 GUIDE WIRE: HCPCS

## 2020-01-23 PROCEDURE — C1894 INTRO/SHEATH, NON-LASER: HCPCS

## 2020-01-23 PROCEDURE — 81240 F2 GENE: CPT | Performed by: NURSE PRACTITIONER

## 2020-01-23 PROCEDURE — 76937 US GUIDE VASCULAR ACCESS: CPT | Performed by: RADIOLOGY

## 2020-01-23 PROCEDURE — 85300 ANTITHROMBIN III ACTIVITY: CPT | Performed by: NURSE PRACTITIONER

## 2020-01-23 PROCEDURE — 85613 RUSSELL VIPER VENOM DILUTED: CPT | Performed by: NURSE PRACTITIONER

## 2020-01-23 PROCEDURE — 3E06317 INTRODUCTION OF OTHER THROMBOLYTIC INTO CENTRAL ARTERY, PERCUTANEOUS APPROACH: ICD-10-PCS | Performed by: RADIOLOGY

## 2020-01-23 PROCEDURE — 99291 CRITICAL CARE FIRST HOUR: CPT | Performed by: NURSE PRACTITIONER

## 2020-01-23 PROCEDURE — 80048 BASIC METABOLIC PNL TOTAL CA: CPT | Performed by: GENERAL PRACTICE

## 2020-01-23 PROCEDURE — 85732 THROMBOPLASTIN TIME PARTIAL: CPT | Performed by: NURSE PRACTITIONER

## 2020-01-23 PROCEDURE — 85027 COMPLETE CBC AUTOMATED: CPT | Performed by: RADIOLOGY

## 2020-01-23 PROCEDURE — 81241 F5 GENE: CPT | Performed by: NURSE PRACTITIONER

## 2020-01-23 PROCEDURE — 84100 ASSAY OF PHOSPHORUS: CPT | Performed by: GENERAL PRACTICE

## 2020-01-23 PROCEDURE — 86147 CARDIOLIPIN ANTIBODY EA IG: CPT | Performed by: NURSE PRACTITIONER

## 2020-01-23 PROCEDURE — 82948 REAGENT STRIP/BLOOD GLUCOSE: CPT

## 2020-01-23 PROCEDURE — C1757 CATH, THROMBECTOMY/EMBOLECT: HCPCS

## 2020-01-23 PROCEDURE — 71045 X-RAY EXAM CHEST 1 VIEW: CPT

## 2020-01-23 PROCEDURE — 83735 ASSAY OF MAGNESIUM: CPT | Performed by: GENERAL PRACTICE

## 2020-01-23 PROCEDURE — 85730 THROMBOPLASTIN TIME PARTIAL: CPT | Performed by: GENERAL PRACTICE

## 2020-01-23 PROCEDURE — 85384 FIBRINOGEN ACTIVITY: CPT | Performed by: RADIOLOGY

## 2020-01-23 PROCEDURE — 85027 COMPLETE CBC AUTOMATED: CPT | Performed by: GENERAL PRACTICE

## 2020-01-23 PROCEDURE — 37212 THROMBOLYTIC VENOUS THERAPY: CPT

## 2020-01-23 PROCEDURE — 99222 1ST HOSP IP/OBS MODERATE 55: CPT | Performed by: INTERNAL MEDICINE

## 2020-01-23 PROCEDURE — 85303 CLOT INHIBIT PROT C ACTIVITY: CPT | Performed by: NURSE PRACTITIONER

## 2020-01-23 PROCEDURE — 76937 US GUIDE VASCULAR ACCESS: CPT

## 2020-01-23 PROCEDURE — 85705 THROMBOPLASTIN INHIBITION: CPT | Performed by: NURSE PRACTITIONER

## 2020-01-23 PROCEDURE — 37212 THROMBOLYTIC VENOUS THERAPY: CPT | Performed by: RADIOLOGY

## 2020-01-23 PROCEDURE — 85730 THROMBOPLASTIN TIME PARTIAL: CPT | Performed by: RADIOLOGY

## 2020-01-23 PROCEDURE — 36014 PLACE CATHETER IN ARTERY: CPT | Performed by: RADIOLOGY

## 2020-01-23 RX ORDER — SODIUM CHLORIDE 9 MG/ML
35 INJECTION, SOLUTION INTRAVENOUS CONTINUOUS
Status: DISCONTINUED | OUTPATIENT
Start: 2020-01-23 | End: 2020-01-24

## 2020-01-23 RX ORDER — TRAMADOL HYDROCHLORIDE 50 MG/1
50 TABLET ORAL EVERY 6 HOURS PRN
Status: DISCONTINUED | OUTPATIENT
Start: 2020-01-23 | End: 2020-02-06 | Stop reason: HOSPADM

## 2020-01-23 RX ORDER — ONDANSETRON 2 MG/ML
4 INJECTION INTRAMUSCULAR; INTRAVENOUS ONCE AS NEEDED
Status: CANCELLED | OUTPATIENT
Start: 2020-01-23

## 2020-01-23 RX ORDER — SODIUM CHLORIDE 9 MG/ML
100 INJECTION, SOLUTION INTRAVENOUS CONTINUOUS
Status: CANCELLED | OUTPATIENT
Start: 2020-01-23

## 2020-01-23 RX ORDER — SODIUM CHLORIDE 9 MG/ML
INJECTION, SOLUTION INTRAVENOUS CONTINUOUS PRN
Status: DISCONTINUED | OUTPATIENT
Start: 2020-01-23 | End: 2020-01-23 | Stop reason: SURG

## 2020-01-23 RX ORDER — HEPARIN SODIUM 200 [USP'U]/100ML
20 INJECTION, SOLUTION INTRAVENOUS CONTINUOUS
Status: DISCONTINUED | OUTPATIENT
Start: 2020-01-23 | End: 2020-01-24

## 2020-01-23 RX ORDER — QUETIAPINE FUMARATE 25 MG/1
25 TABLET, FILM COATED ORAL
Status: DISCONTINUED | OUTPATIENT
Start: 2020-01-23 | End: 2020-02-06 | Stop reason: HOSPADM

## 2020-01-23 RX ORDER — HEPARIN SODIUM 10000 [USP'U]/100ML
500 INJECTION, SOLUTION INTRAVENOUS CONTINUOUS
Status: DISCONTINUED | OUTPATIENT
Start: 2020-01-23 | End: 2020-01-24

## 2020-01-23 RX ORDER — MIDAZOLAM HYDROCHLORIDE 2 MG/2ML
INJECTION, SOLUTION INTRAMUSCULAR; INTRAVENOUS AS NEEDED
Status: DISCONTINUED | OUTPATIENT
Start: 2020-01-23 | End: 2020-01-23 | Stop reason: SURG

## 2020-01-23 RX ORDER — FENTANYL CITRATE 50 UG/ML
INJECTION, SOLUTION INTRAMUSCULAR; INTRAVENOUS AS NEEDED
Status: DISCONTINUED | OUTPATIENT
Start: 2020-01-23 | End: 2020-01-23 | Stop reason: SURG

## 2020-01-23 RX ORDER — OXYCODONE HYDROCHLORIDE 5 MG/1
5 TABLET ORAL EVERY 4 HOURS PRN
Status: DISCONTINUED | OUTPATIENT
Start: 2020-01-23 | End: 2020-01-23

## 2020-01-23 RX ADMIN — MIDAZOLAM 0.5 MG: 1 INJECTION INTRAMUSCULAR; INTRAVENOUS at 09:35

## 2020-01-23 RX ADMIN — QUETIAPINE FUMARATE 25 MG: 25 TABLET ORAL at 21:57

## 2020-01-23 RX ADMIN — SODIUM CHLORIDE 35 ML/HR: 0.9 INJECTION, SOLUTION INTRAVENOUS at 11:45

## 2020-01-23 RX ADMIN — HEPARIN SODIUM 5000 UNITS: 1000 INJECTION INTRAVENOUS; SUBCUTANEOUS at 04:12

## 2020-01-23 RX ADMIN — ALTEPLASE 0.5 MG/HR: 2.2 INJECTION, POWDER, LYOPHILIZED, FOR SOLUTION INTRAVENOUS at 11:20

## 2020-01-23 RX ADMIN — MIDAZOLAM 0.5 MG: 1 INJECTION INTRAMUSCULAR; INTRAVENOUS at 09:26

## 2020-01-23 RX ADMIN — FENTANYL CITRATE 12.5 MCG: 50 INJECTION, SOLUTION INTRAMUSCULAR; INTRAVENOUS at 10:36

## 2020-01-23 RX ADMIN — ACETAMINOPHEN 650 MG: 325 TABLET ORAL at 18:25

## 2020-01-23 RX ADMIN — MIDAZOLAM 0.5 MG: 1 INJECTION INTRAMUSCULAR; INTRAVENOUS at 09:30

## 2020-01-23 RX ADMIN — TRAMADOL HYDROCHLORIDE 50 MG: 50 TABLET, FILM COATED ORAL at 13:49

## 2020-01-23 RX ADMIN — SODIUM CHLORIDE: 9 INJECTION, SOLUTION INTRAVENOUS at 09:09

## 2020-01-23 RX ADMIN — HEPARIN SODIUM IN SODIUM CHLORIDE 20 UNITS/HR: 200 INJECTION INTRAVENOUS at 11:21

## 2020-01-23 RX ADMIN — MIDAZOLAM 0.5 MG: 1 INJECTION INTRAMUSCULAR; INTRAVENOUS at 10:11

## 2020-01-23 RX ADMIN — HEPARIN SODIUM IN SODIUM CHLORIDE 20 UNITS/HR: 200 INJECTION INTRAVENOUS at 11:20

## 2020-01-23 RX ADMIN — MIDAZOLAM 0.5 MG: 1 INJECTION INTRAMUSCULAR; INTRAVENOUS at 09:51

## 2020-01-23 RX ADMIN — MIDAZOLAM 0.5 MG: 1 INJECTION INTRAMUSCULAR; INTRAVENOUS at 09:43

## 2020-01-23 RX ADMIN — ALTEPLASE 0.5 MG/HR: 2.2 INJECTION, POWDER, LYOPHILIZED, FOR SOLUTION INTRAVENOUS at 11:34

## 2020-01-23 NOTE — ANESTHESIA PREPROCEDURE EVALUATION
Review of Systems/Medical History  Patient summary reviewed  Chart reviewed  No history of anesthetic complications     Cardiovascular  EKG reviewed, Hypertension , MOORE,   Comment: Acute bilateral PE w/ cor pulmonale, Pulmonary hypertension Pulmonary  Smoker ex-smoker  ,        GI/Hepatic  Negative GI/hepatic ROS          Negative  ROS        Endo/Other  Diabetes well controlled type 2 Oral agent,   Obesity (BMI 51 29)  super morbid obesity   GYN       Hematology  Negative hematology ROS      Musculoskeletal  Negative musculoskeletal ROS        Neurology  Negative neurology ROS      Psychology   Negative psychology ROS              Physical Exam    Airway    Mallampati score: I  TM Distance: >3 FB  Neck ROM: full     Dental   No notable dental hx     Cardiovascular  Rhythm: regular, Rate: normal, Cardiovascular exam normal    Pulmonary  Pulmonary exam normal Breath sounds clear to auscultation,     Other Findings        Anesthesia Plan  ASA Score- 3     Anesthesia Type- IV sedation with anesthesia with ASA Monitors  Additional Monitors:   Airway Plan:         Plan Factors-    Induction- intravenous  Postoperative Plan-     Informed Consent- Anesthetic plan and risks discussed with patient and son  I personally reviewed this patient with the CRNA  Discussed and agreed on the Anesthesia Plan with the CRNA           NPO and allergies verified  Plan:  MAC/sedation    Risks and benefits discussed with patient including possibility of recall under sedation  Questions answered  Patient consented

## 2020-01-23 NOTE — ASSESSMENT & PLAN NOTE
Lab Results   Component Value Date    HGBA1C 6 7 (H) 01/21/2020       Recent Labs     01/22/20  0802 01/22/20  1110 01/22/20  1614 01/22/20  2104   POCGLU 123 106 108 109       Blood Sugar Average: Last 72 hrs:     Hold metformin  ISS

## 2020-01-23 NOTE — INTERVAL H&P NOTE
Update: (This section must be completed if the H&P was completed greater than 24 hrs to procedure or admission)    Pt now at [address]  [facility]  Seen this AM  Flat in bed  tpa checklist reviewed  CT head reviewed  No contraindications  Discussed explicitly risks of bleeding including severe intracranial bleeding or postoperative bleeding, lack of efficacy, Brigid desouza  She is anxious   I am now willing to perform the procedure if she will require general anesthesia as I did not believe is appropriate to electively undergo this risk  We will have anesthesia support and will use sedation  Proceed with PE lysis    Patient re-evaluated   Accept as history and physical     Ok Landin MD/January 23, 2020/9:25 AM

## 2020-01-23 NOTE — ASSESSMENT & PLAN NOTE
Lab Results   Component Value Date    HGBA1C 6 7 (H) 01/21/2020       Recent Labs     01/22/20  2104 01/22/20  2305 01/23/20  0634 01/23/20  1242   POCGLU 109 107 133 124       Blood Sugar Average: Last 72 hrs:  (P) 128 5     · Continue SSI

## 2020-01-23 NOTE — ANESTHESIA POSTPROCEDURE EVALUATION
Post-Op Assessment Note    CV Status:  Stable  Pain Score: 0    Pain management: adequate     Mental Status:  Alert and awake   Hydration Status:  Euvolemic   PONV Controlled:  Controlled   Airway Patency:  Patent   Post Op Vitals Reviewed: Yes      Staff: CRNA, Anesthesiologist           BP   132/76   Temp      Pulse  72   Resp  20   SpO2   94% on 4L nc

## 2020-01-23 NOTE — BRIEF OP NOTE (RAD/CATH)
IR PULMONARY LYSIS Procedure Note    PATIENT NAME: Ame Moss  : 1960  MRN: [id number]    Pre-op Diagnosis:   1  Acute pulmonary embolism with acute cor pulmonale, unspecified pulmonary embolism type ([address] )    2  Acute respiratory failure with hypoxia (HCC)      Post-op Diagnosis:   1  Acute pulmonary embolism with acute cor pulmonale, unspecified pulmonary embolism type ([address] )    2   Acute respiratory failure with hypoxia ([address] )        Surgeon:   Chip Jimenez MD  Assistants:     No qualified resident was available, Resident is only observing    Estimated Blood Loss: minimal  Findings:     Right IJ 6 Yoruba sheath placed  Right IJ 7 Yoruba sheath placed    RIGHT pulmonary EKOS infusion catheter into lower lobe pulmonary artery  LEFT pulmonary EKOS infusion catheter into lower lobe pulmonary artery    Main PA pressures in the mid 70's    Specimens: none    Complications:  None immediate    Anesthesia: Conscious sedation     Plan:  0 5 mg TPA/hour through RIGHT ekos infusion catheter  0 5 mg TPA/hour through LEFT ekos infusion catheter  Non weight based heparin 500/hr, adjust per protocol  Monitor for bleeding  Coolant saline through each EKOS catheter  Heparin saline through each sheath    IR to follow    Chip Jimenez MD     Date: 2020  Time: 12:11 PM

## 2020-01-23 NOTE — ASSESSMENT & PLAN NOTE
Heparin GTT  IR consult placed - no need for urgent catheter directed therapy, so will feed now and make [email or web address]    Appears in setting of cor pulmonale, ok to wait until tomorrow to do procedure  Pulm consult

## 2020-01-23 NOTE — PHYSICAL THERAPY NOTE
Physical Therapy Cancellation Note    PT orders received and chart reviewed  Pt currently on active bed rest  Will hold PT evaluation at this time and continue to follow as able      Tonia Saul, PT, DPT

## 2020-01-23 NOTE — ASSESSMENT & PLAN NOTE
-heparin gtt for now  -currently hemodynamically stable  -likely due to recent left foot surgery on 12/16/2019    Submassive PE by Echo and CT Imaging  D/W Madonna and Pulmonary AP  Case was also reviewed as a courtesy with the patient's brother in law who is interventional radiologist at [facility]    Patient is noted to have a markedly dilated hypokinetic right ventricle with severe right ventricular pressure and volume overloaded  Patient's estimated PA pressures are 70 mmHg, the IVC is markedly dilated and does not collapse  There is no evidence of any clot burden in the right ventricular or right atrium however the clot burden is large enough that it was determined the catheter based lytics would be in the patient's best benefit to avoid significant decompensation

## 2020-01-23 NOTE — ASSESSMENT & PLAN NOTE
Patient presented to [address] [place] [address] with several day history of shortness of breath with activity  Workup included a CTA of the chest which revealed large bilateral pulmonary emboli  RV/LV ratio of 1 66  She was placed on a heparin gtt  Echocardiogram consistent with PE as right ventricle was markedly dilated with evidence of pulmonary hypertension  She was transferred to [facility] for IR intervention     · 1/23: IR placed right and left EKOS catheters with TPA for directed pulmonary lysis   · IR following  · Admitted to ICU with EKOS catheters and continuous heparin infusion  · PE likely secondary to immobility after recent foot surgery, however hypercoagulability workup may be beneficial as an outpatient   · Continue to monitor closely  · Patient denies shortness of breath on nasal cannula  · Continue on tele

## 2020-01-23 NOTE — DISCHARGE SUMMARY
Discharge- Reese Saucedo 1960, 62 y o  female MRN: [id number]    Unit/Bed#: E4 -01 Encounter: [id number]    Primary Care Provider: Lucas Pennington MD   Date and time admitted to hospital: 1/21/2020 12:39 PM        Morbid obesity due to excess calories [facility])  Assessment & Plan  -encourage wt loss  Essential hypertension  Assessment & Plan  -continue hydrochlorothiazide    Type 2 diabetes mellitus without complication, without long-term current use of insulin (HCC)  Assessment & Plan  No results found for: HGBA1C    Recent Labs     01/21/20  1905   POCGLU 134       Blood Sugar Average: Last 72 hrs:  (P) 134  -check A1c  -hold metformin as pt received IV contrast  -sliding scale insulin  -diabetic diet  * Acute pulmonary embolism with acute cor pulmonale (HCC)  Assessment & Plan  -heparin gtt for now  -currently hemodynamically stable  -likely due to recent left foot surgery on 12/16/2019    Submassive PE by Echo and CT Imaging  D/W Bassik and Pulmonary AP  Case was also reviewed as a courtesy with the patient's brother in law who is interventional radiologist at [facility]    Patient is noted to have a markedly dilated hypokinetic right ventricle with severe right ventricular pressure and volume overloaded  Patient's estimated PA pressures are 70 mmHg, the IVC is markedly dilated and does not collapse  There is no evidence of any clot burden in the right ventricular or right atrium however the clot burden is large enough that it was determined the catheter based lytics would be in the patient's best benefit to avoid significant decompensation  Admitting Provider:  Valentina Walker MD  Discharge Provider:  Heather Castro DO  Admission Date: 1/21/2020       Discharge Date: 01/22/20   LOS: 1  Primary Care Physician at Discharge: Lucas Pennington [phone]    HOSPITAL COURSE:  Reese Saucedo is a 62 y o  female who presented shortness of breath and difficulty breathing    Her past medical history is notable for recent left ankle fracture status post repair from December 16th  The patient subsequently developed leg swelling as well as shortness of breath for the last 2 weeks  Her care was complicated by the fact that she was significantly immobile secondary to morbid obesity of a with a BMI of 52  Patient was found to have a large bilateral pulmonary embolism, an echocardiogram was obtained which showed significant right ventricular strain pattern  Consultation was obtained through the Interventional Radiology and Pulmonary Service, and the patient subsequently was transferred to [facility] in [place] for urgent catheter directed thrombolytics  The case was discussed with the patient's brother-in-law at her request given that he is a interventional radiologist at [facility]  Was advised that the patient had significant we dilated right ventricle as well as increased pulmonary pressures in the pulmonary artery with an estimated peak systolic pressure of 73 mmHg  At the time of discharge the patient was placed on levels to step-down, pulse oximetry was ordered and she was transferred on a heparin drip with planned urgent Interventional Radiology support  The patient case was discussed with the Critical Care Team as well as the hospital medicine team, with plans for admission to the intensive care unit after catheter based thrombolytics  Thank you for choosing [facility] for your healthcare needs  If I can be of any assistance in the future, please feel to contact me  The patient, initially admitted to the hospital as inpatient, was discharged earlier than expected given the following: Patient was transferred to tertiary care center  DISCHARGE DIAGNOSES  Morbid obesity due to excess calories ([address] )  Assessment & Plan  -encourage wt loss      Essential hypertension  Assessment & Plan  -continue hydrochlorothiazide    Type 2 diabetes mellitus without complication, without long-term current use of insulin ([facility] [address] )  Assessment & Plan  No results found for: HGBA1C    Recent Labs     01/21/20  1905   POCGLU 134       Blood Sugar Average: Last 72 hrs:  (P) 134  -check A1c  -hold metformin as pt received IV contrast  -sliding scale insulin  -diabetic diet  * Acute pulmonary embolism with acute cor pulmonale (HCC)  Assessment & Plan  -heparin gtt for now  -currently hemodynamically stable  -likely due to recent left foot surgery on 12/16/2019    Submassive PE by Echo and CT Imaging  D/W Bassik and Pulmonary AP  Case was also reviewed as a courtesy with the patient's brother in law who is interventional radiologist at [facility]    Patient is noted to have a markedly dilated hypokinetic right ventricle with severe right ventricular pressure and volume overloaded  Patient's estimated PA pressures are 70 mmHg, the IVC is markedly dilated and does not collapse  There is no evidence of any clot burden in the right ventricular or right atrium however the clot burden is large enough that it was determined the catheter based lytics would be in the patient's best benefit to avoid significant decompensation  CONSULTING PROVIDERS   IP CONSULT TO PULMONOLOGY    PROCEDURES PERFORMED  * No surgery found *    RADIOLOGY RESULTS  Xr Chest 2 Views    Result Date: 1/21/2020  Narrative: CHEST INDICATION:   dyspnea  COMPARISON:  8/13/2010 EXAM PERFORMED/VIEWS:  XR CHEST PA & LATERAL FINDINGS: Prominence of the right border of the mediastinum is seen which is felt to be likely secondary to patient rotation  The lungs are clear  No pneumothorax or pleural effusion  Osseous structures appear within normal limits for patient age  Impression: No acute cardiopulmonary disease  Prominence of the right mediastinal border felt likely related to rotation, consider repeat exam for confirmation   Workstation performed: [id number]     Cta Ed Chest Pe Study    Result Date: 1/21/2020  Narrative: CTA - CHEST WITH IV CONTRAST - PULMONARY ANGIOGRAM INDICATION:   PE suspected, intermediate prob, positive D-dimer  COMPARISON: None  TECHNIQUE: CTA examination of the chest was performed using angiographic technique according to a protocol specifically tailored to evaluate for pulmonary embolism  Axial, sagittal, and coronal 2D reformatted images were created from the source data and  submitted for interpretation  In addition, coronal 3D MIP postprocessing was performed on the acquisition scanner  Radiation dose length product (DLP) for this visit:  1907 mGy-cm   This examination, like all CT scans performed in the [facility], was performed utilizing techniques to minimize radiation dose exposure, including the use of iterative reconstruction and automated exposure control  IV Contrast:  100 mL of iohexol (OMNIPAQUE)  FINDINGS: PULMONARY ARTERIAL TREE:  Large bilateral central pulmonary emboli are identified  LUNGS:  Lungs are clear  There is no tracheal or endobronchial lesion  PLEURA:  Trace right pleural effusion is present  HEART/GREAT VESSELS:  Unremarkable for patient's age  MEDIASTINUM AND MANNY:  Unremarkable  CHEST WALL AND LOWER NECK:   Bilateral breast prostheses are present  VISUALIZED STRUCTURES IN THE UPPER ABDOMEN:  Unremarkable  OSSEOUS STRUCTURES:  No acute fracture or destructive osseous lesion  Impression: Large bilateral central pulmonary emboli  The calculated ratio of right ventricular to left ventricular diameter (RV/LV ratio) is 1 66  This is greater than 0 9, which is abnormal and indicates right heart strain  An abnormal RV/LV ratio has been shown to be associated with an increased risk of 30 day mortality in the setting of acute pulmonary embolism  Urgent consultation with the medical critical care team is recommended    I personally discussed this study with Ishaan Rainey on 1/21/2020 at 3:12 PM  Workstation performed: [id number] Vas Lower Limb Venous Duplex Study, Complete Bilateral    Result Date: 1/22/2020  Narrative:  [facility] REPORT CLINICAL: Indications: Patient presents with recent discovery of pulmonary embolism and physician wants to determine potential source  Patient reports SOB  Operative History: 2019-12-16 Left Foot ORIF Risk Factors The patient has history of Obesity, HTN, previous remote smoking, and Diabetes  FINDINGS:  Segment     Right            Left                    Impression       Impression          CFV         Normal (Patent)  Normal (Patent)     FV Dist                      Thrombosed (acute)  Peroneal                     Thrombosed (acute)  Popliteal                    Thrombosed (acute)  PostTibial                   Thrombosed (acute)     CONCLUSION:  Impression: Technically difficult/limited study to body habitus, patient's inability to tolerate compressions of the right thigh, and poor visualization of calf veins  RIGHT LOWER LIMB: No gross evidence of acute or chronic deep vein thrombosis  No evidence of superficial thrombophlebitis noted  Doppler evaluation shows a normal response to augmentation maneuvers  Popliteal, posterior tibial and anterior tibial arterial Doppler waveforms are triphasic  LEFT LOWER LIMB: Acute deep vein thrombosis is noted in the distal femoral vein, popliteal vein, and in the visualized portions of the posterior tibial and peroneal veins  No evidence of superficial thrombophlebitis noted  Doppler evaluation shows a normal response to augmentation maneuvers  Popliteal, posterior tibial and anterior tibial arterial Doppler waveforms are triphasic    SIGNATURE: Electronically Signed by: Kellie Chamberlain on 2020-01-22 03:37:09 PM      LABS  Results from last 7 days   Lab Units 01/22/20  0422 01/21/20  1247   WBC Thousand/uL 11 79* 13 45*   HEMOGLOBIN g/dL 14 7 15 7*   HEMATOCRIT % 44 9 48 4*   MCV fL 102* 104*   PLATELETS Thousands/uL 100* 109*   INR   -- 1 22*     Results from last 7 days   Lab Units 01/22/20  0422 01/21/20  1247   SODIUM mmol/L 142 144   POTASSIUM mmol/L 3 5 4 4   CHLORIDE mmol/L 102 103   CO2 mmol/L 27 32   BUN mg/dL 18 25   CREATININE mg/dL 0 89 1 09   CALCIUM mg/dL 8 9 9 3   EGFR ml/min/1 73sq m 70 55   GLUCOSE RANDOM mg/dL 119 117     Results from last 7 days   Lab Units 01/21/20  1247   TROPONIN I ng/mL 0 03     Results from last 7 days   Lab Units 01/21/20  1247   NT-PRO BNP pg/mL 2,419*      Results from last 7 days   Lab Units 01/21/20  1247   D-DIMER QUANTITATIVE ug/ml FEU >10 00*     Results from last 7 days   Lab Units 01/22/20  1614 01/22/20  1110 01/22/20  0802 01/22/20  0800 01/21/20  2105 01/21/20  1905   POC GLUCOSE mg/dl 108 106 123 377* 118 134     Results from last 7 days   Lab Units 01/21/20  2153   HEMOGLOBIN A1C % 6 7*                   Cultures:         Invalid input(s): URIBILINOGEN              PHYSICAL EXAM:  Vitals:   Blood Pressure: 118/86 (01/22/20 1900)  Pulse: 86 (01/22/20 1900)  Temperature: 97 9 °F (36 6 °C) (01/22/20 1900)  Temp Source: Tympanic (01/22/20 1900)  Respirations: 20 (01/22/20 1900)  Height: 5' 8" (172 7 cm) (01/21/20 1853)  Weight - Scale: (!) 154 kg (339 lb 15 2 oz) (01/21/20 1853)  SpO2: 90 % (01/22/20 1900)      General: well appearing, no acute distress  HEENT: atraumatic, PERRLA, moist mucosa, normal pharynx, normal tonsils and adenoids, normal tongue, no fluid in sinuses  Neck: Trachea midline, no carotid bruit, no masses  Respiratory: normal chest wall expansion, CTA B, no r/r/w, no rubs  Cardiovascular: RRR, no m/r/g, Normal S1 and S2  Abdomen: Soft, non-tender, non-distended, normal bowel sounds in all quadrants, no hepatosplenomegaly, no tympany  Rectal: deferred  Musculoskeletal: normal ROM in upper and lower extremities  Integumentary: warm, dry, and pink, with no rash, purpura, or petechia  Heme/Lymph: no lymphadenopathy, no bruises  Neurological: Cranial Nerves II-XII grossly intact, no tics, normal sensation to pressure and light touch  Psychiatric: cooperative with normal mood, affect, and cognition      Discharge Disposition:  [facility] Results Pending at Discharge:           Medications   · Summary of Medication Adjustments made as a result of this hospitalization:  No medication change  · Medication Dosing Tapers - Please refer to Discharge Medication List for details on any medication dosing tapers (if applicable to patient)  · Discharge Medication List: See after visit summary for reconciled discharge medications  Diet restrictions:         Diet Orders   (From admission, onward)             Start     Ordered    01/21/20 1910  Room Service  Once     Question:  Type of Service  Answer:  Room Service-Appropriate    01/21/20 1910              Activity restrictions: No strenuous activity  Discharge Condition: fair    Outpatient Follow-Up and Discharge Instructions  See after visit summary section titled Discharge Instructions for information provided to patient and family  Code Status: Level 1 - Full Code  Discharge Statement   I spent 35 minutes discharging the patient  This time was spent on the day of discharge  Greater than 50% of total time was spent with the patient and / or family counseling and / or coordination of care  ** Please Note: This note has been constructed using a voice recognition system   **

## 2020-01-23 NOTE — CONSULTS
Consult Note - Pulmonary   Scott Dk 62 y o  female MRN: [id number]  Unit/Bed#: [facility] Encounter: [id number]      Reason for consultation: Submassive PE    Requesting physician: Dr Devon Aguila    1  Acute hypoxic respiratory failure secondary to acute sub massive bilateral PE  - patient presenting with worsening shortness of breath, dyspnea on exertion, fatigue  - CTA chest with large bilateral central PE with calculated RV/LV ratio 1 66   - elevated D-dimer, proBNP  - patient hemodynamically stable although hypoxic currently requiring 4 L nasal cannula  - subsequent echo revealed severe RV dilation and RA dilation with estimated peak PA pressure 73mmHG, consistent with severe pulmonary hypertension  - given her significant RA and RV dilation, I believe she has some chronicity to her elevated pulmonary pressures, likely due to long-standing untreated sleep apnea, but I do believe there is an acute on chronic component to this due to acute bilateral PE  - given the extent of her clot burden and submassive PE category, will discuss with IR for potential catheter directed thrombolysis  - she is currently hemodynamically stable and likely does not need emergent systemic thrombolytics therapy  - patient NPO, pending IR evaluation  - continue with heparin infusion  - lower extremity duplex noted as below  - titrate nasal cannula as tolerated, pulmonary toilet, maintain SpO2 >90 %  - this is a provoked PE/DVT due to patient's recent foot surgery and a immobility since  She will need approximately 6 months of anticoagulation with re-evaluation and repeat echo in the future    2  Left lower extremity distal femoral, popliteal, posterior tibial, peroneal vein DVT   - an acute DVT of the distal femoral vein, popliteal vein, portions of the posterior tibial, and peroneal veins in the left lower extremity    No DVT in right lower extremity  - continue heparin infusion  - provoked in the setting of recent surgery, will need at least 6 months of anticoagulation    3  Suspected obstructive sleep apnea  - patient with significant signs/symptoms of obstructive sleep apnea  - agree with outpatient sleep study, will likely need CPAP in the future  - patient should follow up outpatient for this    4  Type 2 diabetes mellitus  - A1c noted 6 7%  - continue insulin sliding scale, diabetic diet    5  Hypertension  - can likely monitor off hydrochlorothiazide for now, will defer to primary team    Case to be discussed with Dr Ayan So  History of Present Illness      HPI:  Magali Matias is a 62 y o  female with past medical history of hypertension, type 2 diabetes, morbid obesity, who presents for evaluation of shortness of breath and dyspnea on exertion  Patient presented to [facility] emergency department on 01/21/2020 with above complaints worsening shortness of breath and dry cough with mild anorexia  Patient underwent surgery for left foot metatarsal fractures on 12/16/2019 after a traumatic fall  After surgery, patient has been extremely sedentary, although she has recently been using a scooter and able to do her laundry  She does live alone  She was doing relatively well after the surgery, up until the past 5-7 days, during which she has been feeling extreme fatigue, shortness of breath, dyspnea on exertion, dry cough, weakness  She denies any lightheadedness, dizziness, syncope, fever, chills, congestion, nausea, vomiting, chest pain  Upon presentation, patient was found to have a significantly elevated D-dimer of greater than 10,000, with negative troponin and elevated proBNP of 2400  CTA chest was done which showed large bilateral central PE with calculated RV/LV ratio 1 66  Lower extremity duplex showed an acute DVT of the distal femoral vein, popliteal vein, portions of the posterior tibial, and peroneal veins in the left lower extremity  No evidence of acute/chronic DVT in right lower extremity    Patient was started on heparin infusion and subsequent echo revealed EF 91%, grade 1 diastolic dysfunction, markedly dilated right ventricle and dilated right atrium with peak PA pressure 73mmHg, consistent with severe pulmonary hypertension  Vital signs throughout admission [place] were stable, although patient was found to be hypoxic and is currently saturating mid 90s on 4 L nasal cannula  Heart rate is in the 70s, respirations low 20s, stable blood pressure 336 systolic  Due to findings of submassive PE and potential for IR evaluation, patient was transferred to [place] for further treatment  In terms of history, patient has no known pulmonary or cardiac disease and no history of COPD/asthma or congestive heart failure  She is a chronic smoker since her teens, but only smokes a few cigarettes per week, mostly socially or in the morning with her coffee  She does not use any inhalers at home and has never seen a pulmonologist   She has no history of any blood clots and no family history of clotting  She is relatively active at home, although she does live alone, she is independent with her activities of daily living  She works as a  at home, and is often sitting at a desk for long periods of time  She has no known occupational exposures  She has no recent prolonged travel or plane rides in no other recent surgeries other than the foot surgery  Of note, patient did have a colonoscopy in November and was found to have polyps  After the procedure, she was told by the anesthesiologist that she had significant snoring and possible apneic events and has been subsequently scheduled for future sleep study  She does snore at night and have further symptoms of sleep apnea  Review of Systems   Constitutional: Positive for activity change, appetite change and fatigue  Negative for chills, fever and unexpected weight change  HENT: Negative for congestion, rhinorrhea, sneezing and sore throat      Respiratory: Positive for chest tightness and shortness of breath  Negative for cough and wheezing  Dyspnea on exertion   Cardiovascular: Positive for leg swelling  Negative for chest pain and palpitations  Gastrointestinal: Negative for abdominal pain, constipation, diarrhea, nausea and vomiting  Genitourinary: Negative for dysuria  Musculoskeletal: Negative for arthralgias  Neurological: Negative for dizziness, syncope, light-headedness and numbness  Snoring         Historical Information   Past Medical History:   Diagnosis Date    Colon polyp     Diabetes mellitus ([address] )     pre    Hypertension     Muscle weakness     elev enzymes    Seasonal allergies      Past Surgical History:   Procedure Laterality Date    APPENDECTOMY      AUGMENTATION BREAST      CERVICAL BIOPSY  W/ LOOP ELECTRODE EXCISION      COLONOSCOPY  11/2019    DILATION AND CURETTAGE OF UTERUS      HYSTEROSCOPY N/A 3/20/2018    Procedure: HYSTEROSCOPY;  Surgeon: Liam Yi MD;  [facility];  Service: Gynecology Oncology    ORIF FOOT FRACTURE Left 12/16/2019    Procedure: ORIF LISFRANC;  Surgeon: Waldemar Moseley DPM;  Location: [address] [place] [address] MAIN OR;  Service: Podiatry    [place] DILATION/CURETTAGE,DIAGNOSTIC N/A 3/20/2018    Procedure: DILATATION AND CURETTAGE (D&C),HYSTEROSCOPY;  Surgeon: Liam Yi MD;  Location:  MAIN OR;  Service: Gynecology Oncology    TUBAL LIGATION      TUBAL REANASTOMOSIS       Family History   Problem Relation Age of Onset    Cancer Maternal Aunt         BLADDER    Colon polyps Family     Glaucoma Father     Peripheral vascular disease Father        Occupational History:  Works as a  from home-relatively sedentary, no occupational exposures    Social History:  Chronic social tobacco abuse  Smokes about 3 cigarettes per week, mostly socially  Has done this since her teens  Does have significant alcohol use, drinking about 1 martini per day  Denies drug use      Meds/Allergies Current Facility-Administered Medications   Medication Dose Route Frequency    acetaminophen (TYLENOL) tablet 650 mg  650 mg Oral Q6H PRN    ascorbic acid (VITAMIN C) tablet 500 mg  500 mg Oral Daily    calcium carbonate (OYSTER SHELL,OSCAL) 500 mg tablet 1 tablet  1 tablet Oral Daily With Breakfast    heparin (porcine) 25,000 units in 250 mL infusion (premix)  3-30 Units/kg/hr (Order-Specific) Intravenous Titrated    heparin (porcine) injection 10,000 Units  10,000 Units Intravenous PRN    heparin (porcine) injection 5,000 Units  5,000 Units Intravenous PRN    hydrochlorothiazide (HYDRODIURIL) tablet 50 mg  50 mg Oral Daily    insulin lispro (HumaLOG) 100 units/mL subcutaneous injection 2-12 Units  2-12 Units Subcutaneous TID AC    multivitamin-minerals (CENTRUM) tablet 1 tablet  1 tablet Oral Daily    zinc gluconate tablet 50 mg  50 mg Oral Daily     Medications Prior to Admission   Medication    ascorbic acid (VITAMIN C) 500 mg tablet    Calcium Carbonate-Vit D-Min (CALCIUM 600+D PLUS MINERALS) 600-400 MG-UNIT TABS    hydrochlorothiazide (HYDRODIURIL) 50 mg tablet    Krill Oil 500 MG CAPS    metFORMIN (GLUCOPHAGE) 500 mg tablet    Multiple Vitamins-Minerals (MULTIVITAMIN ADULT PO)    POTASSIUM PO    zinc gluconate 50 mg tablet     Allergies   Allergen Reactions    Erythromycin Base GI Intolerance    Lisinopril Cough       Vitals:    01/23/20 0430 01/23/20 0530 01/23/20 0650 01/23/20 0700   BP: 109/67 127/71 138/83 138/83   BP Location:   Left arm    Pulse: 80 72 73 76   Resp: 22 22 21 (!) 25   Temp: 98 °F (36 7 °C)  97 9 °F (36 6 °C)    TempSrc: Oral  Oral    SpO2: 92% 93% 93% 94%   Weight:       Height:           Physical Exam   Constitutional: She is oriented to person, place, and time  No distress  Morbidly obese, on nasal cannula   HENT:   Head: Normocephalic and atraumatic  Mouth/Throat: Oropharynx is clear and moist  No oropharyngeal exudate     Large neck circumference   Eyes: EOM are normal  No scleral icterus  Cardiovascular: Normal rate, regular rhythm and normal heart sounds  No murmur heard  Pulmonary/Chest: Effort normal  No respiratory distress  She has no wheezes  Decreased breath sounds likely secondary to body habitus   Abdominal: Soft  Bowel sounds are normal  She exhibits no distension  There is no tenderness  Musculoskeletal: She exhibits edema (Trace bilateral lower extremity edema)  Left foot in brace    Neurological: She is alert and oriented to person, place, and time  Skin: She is not diaphoretic  Labs: I have personally reviewed pertinent lab results  Results from last 7 days   Lab Units 01/23/20  0339 01/22/20  0422 01/21/20  1247   WBC Thousand/uL 11 31* 11 79* 13 45*   HEMOGLOBIN g/dL 14 4 14 7 15 7*   HEMATOCRIT % 45 0 44 9 48 4*   PLATELETS Thousands/uL 122* 100* 109*   NEUTROS PCT %  --   --  72   MONOS PCT %  --   --  9      Results from last 7 days   Lab Units 01/23/20  0338 01/22/20  0422 01/21/20  1247   POTASSIUM mmol/L 4 6 3 5 4 4   CHLORIDE mmol/L 104 102 103   CO2 mmol/L 31 27 32   BUN mg/dL 17 18 25   CREATININE mg/dL 0 82 0 89 1 09   CALCIUM mg/dL 8 9 8 9 9 3     Results from last 7 days   Lab Units 01/23/20  0338   MAGNESIUM mg/dL 2 4     Results from last 7 days   Lab Units 01/23/20  0337   PHOSPHORUS mg/dL 5 2*      Results from last 7 days   Lab Units 01/23/20  0319 01/22/20  1831 01/22/20  1109  01/21/20  1247   INR   --   --   --   --  1 22*   PTT seconds 57* 96* 55*   < > 27    < > = values in this interval not displayed  0   Lab Value Date/Time    TROPONINI 0 03 01/21/2020 1247       Imaging and other studies: I have personally reviewed pertinent reports  and I have personally reviewed pertinent films in PACS  CTA chest 1/21/20   large bilateral central PE with calculated RV/LV ratio 1 66       Lower extremity duplex 1/22  an acute DVT of the distal femoral vein, popliteal vein, portions of the posterior tibial, and peroneal veins in the left lower extremity  No DVT in right lower extremity    Pulmonary function testing:  None    EKG, Pathology, and Other Studies: I have personally reviewed pertinent reports      Echo 01/22/2020  EF 78%, grade 1 diastolic dysfunction, markedly dilated right ventricle and dilated right atrium with peak PA pressure 73mmHg, consistent with severe pulmonary hypertension    Code Status: Level 1 - Full Code        Luc Godinez MD  Pulmonary & Critical Care Fellow, [facility]

## 2020-01-23 NOTE — UTILIZATION REVIEW
Notification of Inpatient Admission/Inpatient Authorization Request   This is a Notification of Inpatient Admission for [address]  Be advised that this patient was admitted to our facility under Inpatient Status  Contact Kalpesh Lyman at [phone] for additional admission information  [facility]  DEDICATED -[phone]  Patient Name:   Sienna Resendiz   YOB: 1960       [address]   [address]:   [facility]  Tax ID: [id number]  NPI: [id number] Attending Provider/NPI: Cathleen Sierra, [address] [[id number]]   Attending Physician:  IVAN Mayen  Specialty- Emergency Medicine,   [address] [id number]  [address]  Phone 1: [phone]  Fax: [phone]   Place of Service Code: 24     Place of Service Name:  [address] [place] [address]   Start Date: 1/22/20 2222     Discharge Date & Time: No discharge date for patient encounter  Type of Admission: Inpatient Status Discharge Disposition (if discharged): [address] [place] [address]   Patient Diagnoses: Pulmonary embolism [facility]) [I26 [id number]]     Orders: Admission Orders (From admission, onward)     Ordered        01/22/20 2239  Inpatient Admission  Once                    Assigned Utilization Review Contact: Kalpesh Lyman  Utilization   Network Utilization Review Department  Phone: [phone]; Fax [phone]  Email: [email or web address]   ATTENTION PAYERS: Please call the assigned Utilization  directly with any questions or concerns ALL voicemails in the department are confidential  Send all requests for admission clinical reviews, approved or denied determinations and any other requests to dedicated fax number belonging to the campus where the patient is receiving treatment

## 2020-01-23 NOTE — OCCUPATIONAL THERAPY NOTE
Occupational Therapy Cancellation Note    Orders received and chart reviewed  OT attempted to see, however Pt currently off of the unit at IR for lysis procedure  Will continue to follow to be seen for OT evaluation post-op as appropriate/when medically cleared         Daryl Tellez MS, OTR/L

## 2020-01-23 NOTE — ASSESSMENT & PLAN NOTE
· Lower extremity duplex confirms left leg "Acute deep vein thrombosis is noted in the distal femoral vein, popliteal vein,  and in the visualized portions of the posterior tibial and peroneal veins"  · Recent surgical history on left foot (12/16/19), patient is currently non-weight bearing   · Continue heparin infusion

## 2020-01-23 NOTE — H&P
H&P- Ronaldo Adhikari 1960, 62 y o  female MRN: [id number]    Unit/Bed#: [facility] 621-16 Encounter: [id number]    Primary Care Provider: Erin Garcia MD   Date and time admitted to hospital: 1/22/2020 10:22 PM        * Acute pulmonary embolism with acute cor pulmonale (HCC)  Assessment & Plan  Heparin GTT  IR consult placed - no need for urgent catheter directed therapy, so will feed now and make [email or web address]  Appears in setting of cor pulmonale, ok to wait until tomorrow to do procedure  Pulm consult    Acute respiratory failure with hypoxia ([facility] )  Assessment & Plan  2/2 PE  Stable on 2L  Wean as tolerated    Pulmonary hypertension ([facility] [address] )  Assessment & Plan  Pulm consult  Likely 2/2 PE  Defer to pulm if cardio consult indicated  Also, if pt has any signs of acute CHF, which she does not at this time, would definitely get cardio involved at that time    Type 2 diabetes mellitus without complication, without long-term current use of insulin [facility])  Assessment & Plan  Lab Results   Component Value Date    HGBA1C 6 7 (H) 01/21/2020       Recent Labs     01/22/20  0802 01/22/20  1110 01/22/20  1614 01/22/20  2104   POCGLU 123 106 108 109       Blood Sugar Average: Last 72 hrs:     Hold metformin  ISS    Morbid obesity with BMI of 50 0-59 9, adult ([facility] [address] )  Assessment & Plan  DM diet      VTE Prophylaxis: Heparin Drip  / sequential compression device   Code Status: Full  POLST: POLST form is not discussed and not completed at this time  Discussion with family: yes    Anticipated Length of Stay:  Patient will be admitted on an Inpatient basis with an anticipated length of stay of  At least 2 midnights  Justification for Hospital Stay: need for O2 and possible catheter directed therapy    Total Time for Visit, including Counseling / Coordination of Care: 45 minutes  Greater than 50% of this total time spent on direct patient counseling and coordination of care      Chief Complaint:   SOB    History of Present Illness:    Reese Saucedo is a 62 y o  female w/ recent foot surgery who presents with MOORE  Pt says she can only walk a few feet w/o SOB  At rest she feels fine  No CP or n/v   Presented to [facility] ER and found to have large PE with right heart strain  Pt determined at that time to not need catheter directed therapy  Echo then showed severe pulm htn w/ right heart fail and decision made to transfer to [facility] for eval by IR for possible CDT  Pt still feels SOB w/ ambulation but feels ok at rest        Review of Systems:    Review of Systems   Constitutional: Negative  HENT: Negative  Eyes: Negative  Respiratory: Positive for shortness of breath  Cardiovascular: Negative  Gastrointestinal: Negative  Endocrine: Negative  Genitourinary: Negative  Musculoskeletal: Negative  Skin: Negative  Allergic/Immunologic: Negative  Neurological: Negative  Hematological: Negative  Psychiatric/Behavioral: Negative          Past Medical and Surgical History:     Past Medical History:   Diagnosis Date    Colon polyp     Diabetes mellitus ([address] )     pre    Hypertension     Muscle weakness     elev enzymes    Seasonal allergies        Past Surgical History:   Procedure Laterality Date    APPENDECTOMY      AUGMENTATION BREAST      CERVICAL BIOPSY  W/ LOOP ELECTRODE EXCISION      COLONOSCOPY  11/2019    DILATION AND CURETTAGE OF UTERUS      HYSTEROSCOPY N/A 3/20/2018    Procedure: HYSTEROSCOPY;  Surgeon: Slim Holland MD;  [facility];  Service: Gynecology Oncology    ORIF FOOT FRACTURE Left 12/16/2019    Procedure: ORIF LISFRANC;  Surgeon: Marvin Troy DPM;  Location: [facility] MAIN OR;  Service: Podiatry    [place] DILATION/CURETTAGE,DIAGNOSTIC N/A 3/20/2018    Procedure: DILATATION AND CURETTAGE (D&C),HYSTEROSCOPY;  Surgeon: Slim Holland MD;  [facility];  Service: Gynecology Oncology    TUBAL LIGATION      TUBAL REANASTOMOSIS         Meds/Allergies:    Prior to Admission medications    Medication Sig Start Date End Date Taking? Authorizing Provider   ascorbic acid (VITAMIN C) 500 mg tablet Take 500 mg by mouth daily    Historical Provider, MD   Calcium Carbonate-Vit D-Min (CALCIUM 600+D PLUS MINERALS) 600-400 MG-UNIT TABS Take by mouth    Historical Provider, MD   hydrochlorothiazide (HYDRODIURIL) 50 mg tablet Take 50 mg by mouth daily   1/2/18 1/21/20  Historical Provider, MD [place] 500 MG CAPS Take by mouth daily    Historical Provider, MD   metFORMIN (GLUCOPHAGE) 500 mg tablet Take 500 mg by mouth 2 (two) times a day with meals   1/2/18 1/21/20  Historical Provider, MD   Multiple Vitamins-Minerals (MULTIVITAMIN ADULT PO) Take 1 tablet by mouth 5/5/08   Historical Provider, MD   POTASSIUM PO Take by mouth daily Dosage unknown to patient    Historical Provider, MD   zinc gluconate 50 mg tablet Take 50 mg by mouth daily    Historical Provider, MD     I have reviewed home medications with patient personally  Allergies:    Allergies   Allergen Reactions    Erythromycin Base GI Intolerance    Lisinopril Cough       Social History:     Marital Status:      Substance Use History:   Social History     Substance and Sexual Activity   Alcohol Use Yes    Alcohol/week: 8 0 standard drinks    Types: 7 Glasses of wine, 1 Standard drinks or equivalent per week     Social History     Tobacco Use   Smoking Status Former Smoker    Types: Cigarettes   Smokeless Tobacco Never Used   Tobacco Comment    QUIT 2010, socially - not for 2 weeks     Social History     Substance and Sexual Activity   Drug Use No       Family History:    Family History   Problem Relation Age of Onset    Cancer Maternal Aunt         BLADDER    Colon polyps Family     Glaucoma Father     Peripheral vascular disease Father        Physical Exam:     Vitals:   Temperature: 97 8 °F (36 6 °C) (01/22/20 2259)  Temp Source: Oral (01/22/20 2259)  Height: 5' 8" (172 7 cm) (01/22/20 2232)    Physical Exam Constitutional: She is oriented to person, place, and time  No distress  HENT:   Head: Normocephalic and atraumatic  Eyes: Conjunctivae and EOM are normal    Neck: Normal range of motion  Neck supple  Cardiovascular: Normal rate and regular rhythm  Pulmonary/Chest: Effort normal and breath sounds normal  She has no wheezes  She has no rales  Abdominal: Soft  Bowel sounds are normal  She exhibits no distension  There is no tenderness  obese   Musculoskeletal: She exhibits no edema  Left ankle in boot   Neurological: She is alert and oriented to person, place, and time  Skin: Skin is warm and dry  She is not diaphoretic  Additional Data:     Lab Results: I have personally reviewed pertinent reports  Results from last 7 days   Lab Units 01/22/20  0422 01/21/20  1247   WBC Thousand/uL 11 79* 13 45*   HEMOGLOBIN g/dL 14 7 15 7*   HEMATOCRIT % 44 9 48 4*   PLATELETS Thousands/uL 100* 109*   NEUTROS PCT %  --  72   LYMPHS PCT %  --  17   MONOS PCT %  --  9   EOS PCT %  --  1     Results from last 7 days   Lab Units 01/22/20  0422   SODIUM mmol/L 142   POTASSIUM mmol/L 3 5   CHLORIDE mmol/L 102   CO2 mmol/L 27   BUN mg/dL 18   CREATININE mg/dL 0 89   ANION GAP mmol/L 13   CALCIUM mg/dL 8 9   GLUCOSE RANDOM mg/dL 119     Results from last 7 days   Lab Units 01/21/20  1247   INR  1 22*     Results from last 7 days   Lab Units 01/22/20  2104 01/22/20  1614 01/22/20  1110 01/22/20  0802 01/22/20  0800 01/21/20  2105 01/21/20  1905   POC GLUCOSE mg/dl 109 108 106 123 377* 118 134     Results from last 7 days   Lab Units 01/21/20  2153   HEMOGLOBIN A1C % 6 7*           Imaging: I have personally reviewed pertinent reports  IR consult    (Results Pending)       EKG, Pathology, and Other Studies Reviewed on Admission:   · EKG: NSR    Allscripts / Epic Records Reviewed: Yes     ** Please Note: This note has been constructed using a voice recognition system   **

## 2020-01-23 NOTE — ASSESSMENT & PLAN NOTE
Pulm consult  Likely 2/2 PE  Defer to pulm if cardio consult indicated    Also, if pt has any signs of acute CHF, which she does not at this time, would definitely get cardio involved at that time

## 2020-01-23 NOTE — UTILIZATION REVIEW
Initial Clinical Review    Admission: Date/Time/Statement: Inpatient Admission Orders (From admission, onward)     Ordered        01/22/20 2239  Inpatient Admission  Once             Orders Placed This Encounter   Procedures    Inpatient Admission     Standing Status:   Standing     Number of Occurrences:   1     Order Specific Question:   Admitting Physician     Answer:   Jc April [[id number]]     Order Specific Question:   Level of Care     Answer:   Level 2 Stepdown - ICU     Order Specific Question:   Estimated length of stay     Answer:   More than 2 Midnights     Order Specific Question:   Certification     Answer:   I certify that inpatient services are medically necessary for this patient for a duration of greater than two midnights  See H&P and MD Progress Notes for additional information about the patient's course of treatment  Arrival Information:  1/22/2020 AT 2239 EMERGENT INPATIENT TRANSFER FROM [facility] TO [address] EXTENSIVE LLE DVT, SUB-MASSIVE BILATERAL PULMONARY EMBOLI WITH RIGHT HEART STRAIN - FOR INT RADIOLOGY CATHETER DIRECTED PULMONARY LYSIS    Assessment/Plan: 62year old female with PMHx morbid obesity, HTN, DM2, REBEKAH, recent left foot surgery 12/16/19  2nd fall with metatarsal fractures  Post Op she has been extremely sedentary, although recentlyable to use a  scooter and do her laundry  Initially presented to [facility] ED on 1/21/2020 2nd 5-7 day history of  worsening SOB, dry cough, extreme fatigue and weakness with mild anorexia  In ED - D-dimer > 10,000  CT CHEST - large bilateral central PE with a calculated RV/LV ratio of 1 66  Admitted inpatient to [facility] 1/21/2020 with 2-4 L O2, IV Heparin gtt, Pulmonary consulted    ECHO showed severe RV dilation and RA dilation with estimated peak PA pressure 73mmHG, consistent with severe pulmonary hypertension/ Right Heart Failure *DECISION made to transfer to [facility] for INT RAD CATHETER DIRECTED PULMONARY LYSIS     PULMONARY CONSULT 1/23/2020:   1  Acute hypoxic respiratory failure secondary to acute sub massive bilateral PE  - patient presenting with worsening shortness of breath, dyspnea on exertion, fatigue  - CTA chest with large bilateral central PE with calculated RV/LV ratio 1 66   - elevated D-dimer, proBNP  - patient hypoxic currently requiring 4 L nasal cannula  - subsequent ECHO revealed severe RV dilation and RA dilation with estimated peak PA pressure 73mmHG, consistent with severe pulmonary hypertension  - given her significant RA and RV dilation, I believe she has some chronicity to her elevated pulmonary pressures, likely due to long-standing untreated sleep apnea, but I do believe there is an acute on chronic component to this due to acute bilateral PE  - given the extent of her clot burden and submassive PE category, will discuss with IR for potential catheter directed thrombolysis  - patient NPO, pending IR evaluation  - continue with heparin infusion  - lower extremity duplex noted as below  - titrate nasal cannula as tolerated, pulmonary toilet, maintain SpO2 >90 %  - this is a provoked PE/DVT due to patient's recent foot surgery and a immobility since  She will need approximately 6 months of anticoagulation with re-evaluation and repeat echo in the future     2  Left lower extremity distal femoral, popliteal, posterior tibial, peroneal vein DVT   - an acute DVT of the distal femoral vein, popliteal vein, portions of the posterior tibial, and peroneal veins in the left lower extremity  No DVT in right lower extremity  - continue heparin infusion  - provoked in the setting of recent surgery, will need at least 6 months of anticoagulation    IR PULMONARY LYSIS PROCEDURE NOTE:   Pre-op Diagnosis:   1  Acute pulmonary embolism with acute cor pulmonale, unspecified pulmonary embolism type ([address] )    2  Acute respiratory failure with hypoxia ([facility])       Post-op Diagnosis:   1   Acute pulmonary embolism with acute cor pulmonale, unspecified pulmonary embolism type ([address] )    2   Acute respiratory failure with hypoxia (HCC)       Findings:    Right IJ 6 Georgian sheath placed  Right IJ 7 Georgian sheath placed     RIGHT pulmonary EKOS infusion catheter into lower lobe pulmonary artery  LEFT pulmonary EKOS infusion catheter into lower lobe pulmonary artery     Main PA pressures in the mid 70's     Anesthesia: Conscious sedation     Initial Vitals   Temperature Pulse Respirations Blood Pressure SpO2   01/22/20 2259 01/22/20 2326 01/22/20 2326 01/22/20 2326 01/22/20 2258   97 8 °F (36 6 °C) 82 (!) 31 129/69 93 % w/ 4 L      Temp Source Heart Rate Source Patient Position - Orthostatic VS BP Location FiO2 (%)   01/22/20 2259 01/22/20 2326 01/22/20 2326 01/22/20 2326 --   Oral Monitor Lying Left arm     Wt Readings from Last 1 Encounters:   01/23/20 (!) 153 kg (337 lb 4 9 oz)     Additional Vital Signs:   01/23/20 0730    78  23Abnormal   107/75  87  95 %  NC 4 L    01/23/20 0700    76  25Abnormal   138/83  101  94 %      01/23/20 0650  97 9 °F (36 6 °C)  73  21  138/83    93 %  Nasal cannula    01/23/20 0530    72  22  127/71  93  93 %  NC 4 L    01/23/20 0430  98 °F (36 7 °C)  80  22  109/67  76  92 %  Nasal cannula    01/23/20 0330    84  23Abnormal   133/74  99  95 %  Nasal cannula    01/23/20 0230    82  20  127/85  105  93 %      01/23/20 0130    82  31Abnormal   105/79  88  92 %  NC 4 L    01/23/20 0030    84  27Abnormal   137/62  92  91 %      01/22/20 2326    82  31Abnormal   129/69  93  93 %  Nasal cannula      Pertinent Labs/Diagnostic Test Results:   Results from last 7 days   Lab Units 01/23/20  1243 01/23/20  0339 01/22/20  0422 01/21/20  1247   WBC Thousand/uL 8 64 11 31* 11 79* 13 45*   HEMOGLOBIN g/dL 14 0 14 4 14 7 15 7*   HEMATOCRIT % 42 9 45 0 44 9 48 4*   PLATELETS Thousands/uL 107* 122* 100* 109*   NEUTROS ABS Thousands/µL  --   --   --  9 81*     Results from last 7 days Lab Units 01/23/20  0338 01/23/20  0337 01/22/20  0422 01/21/20  1247   SODIUM mmol/L 140  --  142 144   POTASSIUM mmol/L 4 6  --  3 5 4 4   CHLORIDE mmol/L 104  --  102 103   CO2 mmol/L 31  --  27 32   ANION GAP mmol/L 5  --  13 9   BUN mg/dL 17  --  18 25   CREATININE mg/dL 0 82  --  0 89 1 09   EGFR ml/min/1 73sq m 77  --  70 55   CALCIUM mg/dL 8 9  --  8 9 9 3   MAGNESIUM mg/dL 2 4  --   --   --    PHOSPHORUS mg/dL  --  5 2*  --   --        Results from last 7 days   Lab Units 01/23/20  1242 01/23/20  0634 01/22/20  2305 01/22/20  2104 01/22/20  1614 01/22/20  1110 01/22/20  0802 01/22/20  0800 01/21/20  2105 01/21/20  1905   POC GLUCOSE mg/dl 124 133 107 109 108 106 123 377* 118 134     Results from last 7 days   Lab Units 01/21/20  2153   HEMOGLOBIN A1C % 6 7*   EAG mg/dl 146     Results from last 7 days   Lab Units 01/21/20  1247   TROPONIN I ng/mL 0 03     Results from last 7 days   Lab Units 01/21/20  1247   D-DIMER QUANTITATIVE ug/ml FEU >10 00*     Results from last 7 days   Lab Units 01/23/20  1243 01/23/20  0319 01/22/20  1831  01/21/20  1247   PROTIME seconds  --   --   --   --  15 6*   INR   --   --   --   --  1 22*   PTT seconds 63* 57* 96*   < > 27     Results from last 7 days   Lab Units 01/21/20  1247   NT-PRO BNP pg/mL 2,419*       CTA - CHEST WITH IV CONTRAST - PULMONARY ANGIOGRAM -  Large bilateral central pulmonary emboli  The calculated ratio of right ventricular to left ventricular diameter (RV/LV ratio) is 1 66  This is greater than 0 9, which is abnormal and indicates right heart strain  An abnormal RV/LV ratio has been shown to be associated with an increased risk of 30 day mortality in the setting of acute pulmonary embolism   Urgent consultation with the critical care team is recommended      EKG -      Rate:     ECG rate:  86    ECG rate assessment: normal    Rhythm:     Rhythm: sinus rhythm    Ectopy:     Ectopy: none    QRS:     QRS axis:  Right    QRS intervals: Normal  Conduction:     Conduction: normal    ST segments:     ST segments:  Normal  T waves:     T waves: non-specific      Past Medical History:   Diagnosis Date    Colon polyp     Diabetes mellitus ([address] )     pre    Hypertension     Muscle weakness     elev enzymes    Seasonal allergies      Present on Admission:   Acute pulmonary embolism with acute cor pulmonale (HCC)   Type 2 diabetes mellitus without complication, without long-term current use of insulin (HCC)    Admitting Diagnosis: Pulmonary embolism (HCC) [I26 [id number]]    Age/Sex: 62 y o  female    Admission Orders:  CARDIO PULM MONIITORING  VS, NEURO + NEURO VASCULAR CHECKS Q1HR  Nasal O2 at 4 L/min - TITRATE SPO2 > 92 %  Continuous Pulse Oximetry  Elevate HOB  Turn q2hrs  PT + OT Evals    Scheduled Medications:  ascorbic acid 500 mg Oral Daily   calcium carbonate 1 tablet Oral Daily With Breakfast   hydrochlorothiazide 50 mg Oral Daily   insulin lispro 2-12 Units Subcutaneous TID AC   multivitamin-minerals 1 tablet Oral Daily   zinc gluconate 50 mg Oral Daily     Continuous IV Infusions:  alteplase IV (IR) 0 5 mg/hr Intracatheter Continuous   alteplase IV (IR) 0 5 mg/hr Intracatheter Continuous   heparin (porcine) 500 Units/hr Intravenous Continuous   heparin 20 Units/hr Intravenous Continuous   heparin 20 Units/hr Intravenous Continuous   sodium chloride 35 mL/hr Intravenous Continuous   sodium chloride 35 mL/hr Intravenous Continuous     PRN Meds:  acetaminophen 650 mg Oral Q6H PRN   traMADol 50 mg Oral Q6H PRN  GIVEN X 1     IP CONSULT TO PULMONOLOGY  IP CONSULT TO CASE MANAGEMENT    Network Utilization Review Department  [email or web address]  ATTENTION: Please call with any questions or concerns to [phone] and carefully listen to the prompts so that you are directed to the right person   All voicemails are confidential   Sangita Leonard all requests for admission clinical reviews, approved or denied determinations and any other requests to dedicated fax number below belonging to the campus where the patient is receiving treatment   List of dedicated fax numbers for the Facilities:  [address] DENIALS (Administrative/Medical Necessity) [phone]   [address]  (Maternity/NICU/Pediatrics) [phone]   Luisa Wells [phone]   Quinten Kyle [phone]   Arnaldo Nones [phone]   Garcia Blanco [phone]   [address] [place] [address] [phone]   [facility]  [phone]   [address] [facility] [place] [address] [address] [facility] [phone]

## 2020-01-23 NOTE — RESPIRATORY THERAPY NOTE
RT Protocol Note  Mounika Flores 62 y o  female MRN: [id number]  Unit/Bed#: [facility] 277-42 Encounter: [id number]    Assessment    Principal Problem:    Acute pulmonary embolism with acute cor pulmonale (HCC)  Active Problems: Morbid obesity with BMI of 50 0-59 9, adult ([facility])    Type 2 diabetes mellitus without complication, without long-term current use of insulin ([place] [id number] )    Pulmonary hypertension ([facility])    Acute respiratory failure with hypoxia ([facility])      Home Pulmonary Medications:  None       Past Medical History:   Diagnosis Date    Colon polyp     Diabetes mellitus ([facility] )     pre    Hypertension     Muscle weakness     elev enzymes    Seasonal allergies      Social History     Socioeconomic History    Marital status:      Spouse name: None    Number of children: None    Years of education: None    Highest education level: None   Occupational History    None   Social Needs    Financial resource strain: None    Food insecurity:     Worry: None     Inability: None    Transportation needs:     Medical: None     Non-medical: None   Tobacco Use    Smoking status: Former Smoker     Types: Cigarettes    Smokeless tobacco: Never Used    Tobacco comment: QUIT 2010, socially - not for 2 weeks   Substance and Sexual Activity    Alcohol use:  Yes     Alcohol/week: 8 0 standard drinks     Types: 7 Glasses of wine, 1 Standard drinks or equivalent per week    Drug use: No    Sexual activity: None   Lifestyle    Physical activity:     Days per week: None     Minutes per session: None    Stress: None   Relationships    Social connections:     Talks on phone: None     Gets together: None     Attends Yarsanism service: None     Active member of club or organization: None     Attends meetings of clubs or organizations: None     Relationship status: None    Intimate partner violence:     Fear of current or ex partner: None     Emotionally abused: None     Physically abused: None     Forced sexual activity: None   Other Topics Concern    None   Social History Narrative    None       Subjective         Objective    Physical Exam:   Assessment Type: (P) Assess only  General Appearance: (P) Drowsy  Respiratory Pattern: (P) Spontaneous, Normal  Chest Assessment: (P) Chest expansion symmetrical, Trachea midline  Bilateral Breath Sounds: (P) Diminished  R Breath Sounds: (P) Diminished  L Breath Sounds: (P) Diminished    Vitals:  Blood pressure 133/74, pulse 84, temperature 97 8 °F (36 6 °C), temperature source Oral, resp  rate (!) 23, height 5' 8" (1 727 m), SpO2 95 %  Imaging and other studies: I have personally reviewed pertinent reports  Plan    Respiratory Plan: (P) Mild Distress pathway        Resp Comments: (P) Pt transferred in from another facility on 2 lpm N/C  She reports no known pulmonary hx nor does she use respiratroy meds at home  She admits to feeling a little SOB at times and is currently on 2 lpm N/C  No adverse reactions noted, will continue to monitor status

## 2020-01-23 NOTE — CONSULTS
Consult - Dena Lilly 1960, 62 y o  female MRN: [id number]    Unit/Bed#: [facility] 534-59 Encounter: [id number]    Primary Care Provider: Cathleen Santo MD   Date and time admitted to hospital: 1/22/2020 10:22 PM        * Acute pulmonary embolism with acute cor pulmonale [facility])  Assessment & Plan  Patient presented to [facility] with several day history of shortness of breath with activity  Workup included a CTA of the chest which revealed large bilateral pulmon RV/LV ratio of 1 66  She was placed on a heparin gtt  Echocardiogram consistent with PE as right ventricle was markedly dilated with evidence of pulmonary hypertension  She was transferred to [facility] for IR intervention     · 1/23: IR placed right and left EKOS catheters with TPA for directed pulmonary lysis   · IR following  · Admitted to ICU with EKOS catheters and continuous heparin infusion  · PE likely secondary to immobility after recent foot surgery, however hypercoagulability workup may be beneficial as an outpatient   · Continue to monitor closely  · Patient denies shortness of breath on nasal cannula  · Continue on tele     DVT (deep venous thrombosis) (HCC)  Assessment & Plan  · Lower extremity duplex confirms left leg "Acute deep vein thrombosis is noted in the distal femoral vein, popliteal vein,  and in the visualized portions of the posterior tibial and peroneal veins"  · Recent surgical history on left foot (12/16/19), patient is currently non-weight bearing   · Continue heparin infusion     Type 2 diabetes mellitus without complication, without long-term current use of insulin [facility])  Assessment & Plan  Lab Results   Component Value Date    HGBA1C 6 7 (H) 01/21/2020       Recent Labs     01/22/20  2104 01/22/20  2305 01/23/20  0634 01/23/20  1242   POCGLU 109 107 133 124       Blood Sugar Average: Last 72 hrs:  (P) 128 5     · Continue SSI -------------------------------------------------------------------------------------------------------------  Chief Complaint: shortness of breath     History of Present Illness       Patrica Palacio is a 62 y o  female with past medical history of DMII and hypertension who presented to [facility] on 1/21 with several day history of dyspnea on exertion  She had a recent (12/16/19) foot surgery and has been relatively immobile as she is non-weight bearing  Workup included CTA of her chest that showed large bilateral pulmonary pulmonary emboli with RV/LV ratio of 1 66  She was placed on a heparin gtt  Echocardiogram evidence of RV strain as right ventricle was markedly dilated with evidence of pulmonary hypertension  He was therefore transferred to [facility] for IR directed pulmonary lysis  Today she arrives to the ICU s/p right and left EKOS catheter placement with continuous TPA and heparin infusion  She is hemodynamically stable without complaints  Upon review, she denies history of blood clots or family history of blood clots  No recent air travel  She is post menopausal and does not use hormonal therapy  History obtained from chart review and the patient   -------------------------------------------------------------------------------------------------------------  Dispo: Continue Critical Care     Code Status: Level 1 - Full Code  --------------------------------------------------------------------------------------------------------------  Review of Systems   Constitutional: Negative  HENT: Negative for sore throat  Respiratory: Positive for shortness of breath  Negative for cough, chest tightness and wheezing  Cardiovascular: Negative for chest pain, palpitations and leg swelling  Gastrointestinal: Negative for diarrhea, nausea and vomiting  Genitourinary: Negative  Musculoskeletal: Negative  Negative for arthralgias  Neurological: Negative  Hematological: Negative  Psychiatric/Behavioral: Negative  A 12-point, complete review of systems was reviewed and negative except as stated above     Physical Exam   Constitutional: She is oriented to person, place, and time  She appears well-developed and well-nourished  No distress  HENT:   Head: Normocephalic and atraumatic  Mouth/Throat: No oropharyngeal exudate  Eyes: Pupils are equal, round, and reactive to light  EOM are normal  No scleral icterus  Neck: Normal range of motion  No JVD present  R IJ EKOS catheters x 2   Cardiovascular: Normal rate, regular rhythm, normal heart sounds and intact distal pulses  Exam reveals no friction rub  No murmur heard  Pulmonary/Chest: Effort normal and breath sounds normal  No stridor  No respiratory distress  She has no wheezes  Abdominal: Soft  Bowel sounds are normal  She exhibits no distension  There is no tenderness  There is no guarding  Musculoskeletal: Normal range of motion  She exhibits no edema  Lymphadenopathy:     She has no cervical adenopathy  Neurological: She is alert and oriented to person, place, and time  No cranial nerve deficit  Coordination normal    Skin: Skin is warm and dry  Capillary refill takes less than 2 seconds  She is not diaphoretic  No erythema  Psychiatric: She has a normal mood and affect  Her behavior is normal      --------------------------------------------------------------------------------------------------------------  Vitals:   Vitals:    01/23/20 1500 01/23/20 1600 01/23/20 1638 01/23/20 1700   BP: 141/84  144/82    BP Location:       Pulse: 76 78 80 76   Resp: (!) 25 (!) 29 (!) 33 (!) 27   Temp:       TempSrc:       SpO2: 94% 94% 90% 94%   Weight:       Height:         Temp  Min: 97 2 °F (36 2 °C)  Max: 100 °F (37 8 °C)  IBW: 63 9 kg  Height: 5' 8" (172 7 cm)  Body mass index is 51 29 kg/m²      Laboratory and Diagnostics:  Results from last 7 days   Lab Units 01/23/20  1243 01/23/20  0339 01/22/20  0422 01/21/20  1247 WBC Thousand/uL 8 64 11 31* 11 79* 13 45*   HEMOGLOBIN g/dL 14 0 14 4 14 7 15 7*   HEMATOCRIT % 42 9 45 0 44 9 48 4*   PLATELETS Thousands/uL 107* 122* 100* 109*   NEUTROS PCT %  --   --   --  72   MONOS PCT %  --   --   --  9     Results from last 7 days   Lab Units 01/23/20  0338 01/22/20  0422 01/21/20  1247   SODIUM mmol/L 140 142 144   POTASSIUM mmol/L 4 6 3 5 4 4   CHLORIDE mmol/L 104 102 103   CO2 mmol/L 31 27 32   ANION GAP mmol/L 5 13 9   BUN mg/dL 17 18 25   CREATININE mg/dL 0 82 0 89 1 09   CALCIUM mg/dL 8 9 8 9 9 3   GLUCOSE RANDOM mg/dL 126 119 117     Results from last 7 days   Lab Units 01/23/20  0338 01/23/20  0337   MAGNESIUM mg/dL 2 4  --    PHOSPHORUS mg/dL  --  5 2*      Results from last 7 days   Lab Units 01/23/20  1243 01/23/20  0319 01/22/20  1831 01/22/20  1109 01/22/20  0413 01/21/20  2153 01/21/20  1247   INR   --   --   --   --   --   --  1 22*   PTT seconds 63* 57* 96* 55* 69* 111* 27      Results from last 7 days   Lab Units 01/21/20  1247   TROPONIN I ng/mL 0 03         ABG:    VBG:          Micro:        EKG: normal sinus rhythm on telemetry  Imaging: I have personally reviewed pertinent reports     and I have personally reviewed pertinent films in PACS      Historical Information   Past Medical History:   Diagnosis Date    Colon polyp     Diabetes mellitus ([address] )     pre    Hypertension     Muscle weakness     elev enzymes    Seasonal allergies      Past Surgical History:   Procedure Laterality Date    APPENDECTOMY      AUGMENTATION BREAST      CERVICAL BIOPSY  W/ LOOP ELECTRODE EXCISION      COLONOSCOPY  11/2019    DILATION AND CURETTAGE OF UTERUS      HYSTEROSCOPY N/A 3/20/2018    Procedure: HYSTEROSCOPY;  Surgeon: Yonatan Ferguson MD;  Location:  MAIN OR;  Service: Gynecology Oncology    ORIF FOOT FRACTURE Left 12/16/2019    Procedure: ORIF LISFRANC;  Surgeon: Darien Garcia DPM;  Location: [address] [place] [address] MAIN OR;  Service: Podiatry    TX DILATION/CURETTAGE,DIAGNOSTIC N/A 3/20/2018    Procedure: DILATATION AND CURETTAGE (D&C),HYSTEROSCOPY;  Surgeon: Marcia Bravo MD;  Location: [facility] MAIN OR;  Service: Gynecology Oncology    TUBAL LIGATION      TUBAL REANASTOMOSIS       Social History   Social History     Substance and Sexual Activity   Alcohol Use Yes    Alcohol/week: 8 0 standard drinks    Types: 7 Glasses of wine, 1 Standard drinks or equivalent per week     Social History     Substance and Sexual Activity   Drug Use No     Social History     Tobacco Use   Smoking Status Former Smoker    Types: Cigarettes   Smokeless Tobacco Never Used   Tobacco Comment    QUIT 2010, socially - not for 2 weeks       Family History:   Family History   Problem Relation Age of Onset    Cancer Maternal Aunt         BLADDER    Colon polyps Family     Glaucoma Father     Peripheral vascular disease Father      I have reviewed this patient's family history and commented on sigificant items within the HPI      Medications:  Current Facility-Administered Medications   Medication Dose Route Frequency    acetaminophen (TYLENOL) tablet 650 mg  650 mg Oral Q6H PRN    alteplase (CATHFLO) 10 mg in sodium chloride 0 9 % 250 mL infusion  0 5 mg/hr Intracatheter Continuous    alteplase (CATHFLO) 10 mg in sodium chloride 0 9 % 250 mL infusion  0 5 mg/hr Intracatheter Continuous    ascorbic acid (VITAMIN C) tablet 500 mg  500 mg Oral Daily    calcium carbonate (OYSTER SHELL,OSCAL) 500 mg tablet 1 tablet  1 tablet Oral Daily With Breakfast    heparin (porcine) 25,000 units in 250 mL infusion (premix)  500 Units/hr Intravenous Continuous    heparin 1000 units in 500 mL infusion (premix) for sheath patency  20 Units/hr Intravenous Continuous    heparin 1000 units in 500 mL infusion (premix) for sheath patency  20 Units/hr Intravenous Continuous    hydrochlorothiazide (HYDRODIURIL) tablet 50 mg  50 mg Oral Daily    insulin lispro (HumaLOG) 100 units/mL subcutaneous injection 2-12 Units  2-12 Units Subcutaneous TID AC    multivitamin-minerals (CENTRUM) tablet 1 tablet  1 tablet Oral Daily    sodium chloride 0 9 % infusion  35 mL/hr Intravenous Continuous    sodium chloride 0 9 % infusion  35 mL/hr Intravenous Continuous    traMADol (ULTRAM) tablet 50 mg  50 mg Oral Q6H PRN    zinc gluconate tablet 50 mg  50 mg Oral Daily     Home medications:  Prior to Admission Medications   Prescriptions Last Dose Informant Patient Reported? Taking? Calcium Carbonate-Vit D-Min (CALCIUM 600+D PLUS MINERALS) 600-400 MG-UNIT TABS   Yes No   Sig: Take by mouth   Krill Oil 500 MG CAPS Not Taking at Unknown time  Yes No   Sig: Take by mouth daily   Multiple Vitamins-Minerals (MULTIVITAMIN ADULT PO)   Yes No   Sig: Take 1 tablet by mouth   POTASSIUM PO   Yes No   Sig: Take by mouth daily Dosage unknown to patient   ascorbic acid (VITAMIN C) 500 mg tablet  Self Yes No   Sig: Take 500 mg by mouth daily   hydrochlorothiazide (HYDRODIURIL) 50 mg tablet   Yes No   Sig: Take 50 mg by mouth daily     metFORMIN (GLUCOPHAGE) 500 mg tablet   Yes No   Sig: Take 500 mg by mouth 2 (two) times a day with meals     zinc gluconate 50 mg tablet  Self Yes No   Sig: Take 50 mg by mouth daily      Facility-Administered Medications: None     Allergies: Allergies   Allergen Reactions    Erythromycin Base GI Intolerance    Lisinopril Cough     ------------------------------------------------------------------------------------------------------------  Advance Directive and Living Will:      Power of :    POLST:    ------------------------------------------------------------------------------------------------------------  Counseling / Coordination of Care  Total time spent today 35 minutes  Greater than 50% of total time was spent with the patient and / or family counseling and / or coordination of care  JULISA Méndez        Portions of the record may have been created with voice recognition software    Occasional wrong word or "sound a like" substitutions may have occurred due to the inherent limitations of voice recognition software    Read the chart carefully and recognize, using context, where substitutions have occurred

## 2020-01-24 LAB
ANION GAP SERPL CALCULATED.3IONS-SCNC: 4 MMOL/L (ref 4–13)
APTT PPP: 35 SECONDS (ref 23–37)
APTT PPP: 38 SECONDS (ref 23–37)
APTT PPP: 71 SECONDS (ref 23–37)
BUN SERPL-MCNC: 11 MG/DL (ref 5–25)
CALCIUM SERPL-MCNC: 8.5 MG/DL (ref 8.3–10.1)
CARDIOLIPIN IGA SER IA-ACNC: <9 APL U/ML (ref 0–11)
CARDIOLIPIN IGG SER IA-ACNC: <9 GPL U/ML (ref 0–14)
CARDIOLIPIN IGM SER IA-ACNC: 30 MPL U/ML (ref 0–12)
CHLORIDE SERPL-SCNC: 106 MMOL/L (ref 100–108)
CO2 SERPL-SCNC: 31 MMOL/L (ref 21–32)
CREAT SERPL-MCNC: 0.64 MG/DL (ref 0.6–1.3)
DEPRECATED AT III PPP: 76 % OF NORMAL (ref 92–136)
ERYTHROCYTE [DISTWIDTH] IN BLOOD BY AUTOMATED COUNT: 12.9 % (ref 11.6–15.1)
ERYTHROCYTE [DISTWIDTH] IN BLOOD BY AUTOMATED COUNT: 12.9 % (ref 11.6–15.1)
FIBRINOGEN PPP-MCNC: 277 MG/DL (ref 227–495)
FIBRINOGEN PPP-MCNC: 295 MG/DL (ref 227–495)
GFR SERPL CREATININE-BSD FRML MDRD: 97 ML/MIN/1.73SQ M
GLUCOSE SERPL-MCNC: 105 MG/DL (ref 65–140)
GLUCOSE SERPL-MCNC: 113 MG/DL (ref 65–140)
GLUCOSE SERPL-MCNC: 117 MG/DL (ref 65–140)
GLUCOSE SERPL-MCNC: 120 MG/DL (ref 65–140)
GLUCOSE SERPL-MCNC: 152 MG/DL (ref 65–140)
HCT VFR BLD AUTO: 41.7 % (ref 34.8–46.1)
HCT VFR BLD AUTO: 42.7 % (ref 34.8–46.1)
HGB BLD-MCNC: 13.3 G/DL (ref 11.5–15.4)
HGB BLD-MCNC: 13.7 G/DL (ref 11.5–15.4)
MCH RBC QN AUTO: 32.7 PG (ref 26.8–34.3)
MCH RBC QN AUTO: 33.2 PG (ref 26.8–34.3)
MCHC RBC AUTO-ENTMCNC: 31.9 G/DL (ref 31.4–37.4)
MCHC RBC AUTO-ENTMCNC: 32.1 G/DL (ref 31.4–37.4)
MCV RBC AUTO: 103 FL (ref 82–98)
MCV RBC AUTO: 103 FL (ref 82–98)
PLATELET # BLD AUTO: 95 THOUSANDS/UL (ref 149–390)
PLATELET # BLD AUTO: 96 THOUSANDS/UL (ref 149–390)
PMV BLD AUTO: 10.5 FL (ref 8.9–12.7)
PMV BLD AUTO: 11.3 FL (ref 8.9–12.7)
POTASSIUM SERPL-SCNC: 3.8 MMOL/L (ref 3.5–5.3)
RBC # BLD AUTO: 4.07 MILLION/UL (ref 3.81–5.12)
RBC # BLD AUTO: 4.13 MILLION/UL (ref 3.81–5.12)
SODIUM SERPL-SCNC: 141 MMOL/L (ref 136–145)
WBC # BLD AUTO: 8.09 THOUSAND/UL (ref 4.31–10.16)
WBC # BLD AUTO: 8.54 THOUSAND/UL (ref 4.31–10.16)

## 2020-01-24 PROCEDURE — 85384 FIBRINOGEN ACTIVITY: CPT | Performed by: RADIOLOGY

## 2020-01-24 PROCEDURE — 82948 REAGENT STRIP/BLOOD GLUCOSE: CPT

## 2020-01-24 PROCEDURE — 99233 SBSQ HOSP IP/OBS HIGH 50: CPT | Performed by: NURSE PRACTITIONER

## 2020-01-24 PROCEDURE — 99232 SBSQ HOSP IP/OBS MODERATE 35: CPT | Performed by: INTERNAL MEDICINE

## 2020-01-24 PROCEDURE — NC001 PR NO CHARGE: Performed by: PHYSICIAN ASSISTANT

## 2020-01-24 PROCEDURE — 85730 THROMBOPLASTIN TIME PARTIAL: CPT | Performed by: RADIOLOGY

## 2020-01-24 PROCEDURE — 85027 COMPLETE CBC AUTOMATED: CPT | Performed by: RADIOLOGY

## 2020-01-24 PROCEDURE — 85027 COMPLETE CBC AUTOMATED: CPT | Performed by: GENERAL PRACTICE

## 2020-01-24 PROCEDURE — 80048 BASIC METABOLIC PNL TOTAL CA: CPT | Performed by: GENERAL PRACTICE

## 2020-01-24 RX ORDER — HEPARIN SODIUM 1000 [USP'U]/ML
5000 INJECTION, SOLUTION INTRAVENOUS; SUBCUTANEOUS AS NEEDED
Status: DISCONTINUED | OUTPATIENT
Start: 2020-01-24 | End: 2020-02-06

## 2020-01-24 RX ORDER — HEPARIN SODIUM 10000 [USP'U]/100ML
3-30 INJECTION, SOLUTION INTRAVENOUS
Status: DISCONTINUED | OUTPATIENT
Start: 2020-01-24 | End: 2020-02-06

## 2020-01-24 RX ORDER — HEPARIN SODIUM 1000 [USP'U]/ML
10000 INJECTION, SOLUTION INTRAVENOUS; SUBCUTANEOUS AS NEEDED
Status: DISCONTINUED | OUTPATIENT
Start: 2020-01-24 | End: 2020-02-06

## 2020-01-24 RX ADMIN — TRAMADOL HYDROCHLORIDE 50 MG: 50 TABLET, FILM COATED ORAL at 09:47

## 2020-01-24 RX ADMIN — HEPARIN SODIUM AND DEXTROSE 500 UNITS/HR: 10000; 5 INJECTION INTRAVENOUS at 01:27

## 2020-01-24 RX ADMIN — Medication 1 TABLET: at 09:47

## 2020-01-24 RX ADMIN — TRAMADOL HYDROCHLORIDE 50 MG: 50 TABLET, FILM COATED ORAL at 01:35

## 2020-01-24 RX ADMIN — HYDROCHLOROTHIAZIDE 50 MG: 25 TABLET ORAL at 09:47

## 2020-01-24 RX ADMIN — ALTEPLASE 0.5 MG/HR: 2.2 INJECTION, POWDER, LYOPHILIZED, FOR SOLUTION INTRAVENOUS at 01:00

## 2020-01-24 RX ADMIN — CALCIUM 1 TABLET: 500 TABLET ORAL at 09:47

## 2020-01-24 RX ADMIN — HEPARIN SODIUM AND DEXTROSE 18 UNITS/KG/HR: 10000; 5 INJECTION INTRAVENOUS at 21:04

## 2020-01-24 RX ADMIN — Medication 50 MG: at 09:47

## 2020-01-24 RX ADMIN — QUETIAPINE FUMARATE 25 MG: 25 TABLET ORAL at 21:03

## 2020-01-24 RX ADMIN — OXYCODONE HYDROCHLORIDE AND ACETAMINOPHEN 500 MG: 500 TABLET ORAL at 09:47

## 2020-01-24 NOTE — ASSESSMENT & PLAN NOTE
Patient presented to [facility]"" with several day history of shortness of breath with activity  Workup included a CTA of the chest which revealed large bilateral pulmonary emboli  RV/LV ratio of 1 66  She was placed on a heparin gtt  Echocardiogram consistent with PE as right ventricle was markedly dilated with evidence of pulmonary hypertension  She was transferred to [facility] for IR intervention     · 1/23: IR placed right and left EKOS catheters with TPA for directed pulmonary lysis   · IR following - for recheck today  · PE likely secondary to immobility after recent foot surgery, however hypercoagulability workup may be beneficial as an outpatient   · Continue to monitor closely  · Patient denies shortness of breath on nasal cannula  · Continue on tele

## 2020-01-24 NOTE — QUICK NOTE
S/P one day catheter directed thrombolysis for submassive pulmonary embolism  Subjective symptomatic improvement  I went with the interventional radiology team to bedside and we measured pressures:      Right ventricular pressure:  42/5, mean of 16  Right main pulmonary artery:  22 peak systolic, mean of 7  Distal right pulmonary artery:  11/3, mean of 5  Main left pulmonary artery:  Approximately 57-14 peak systolic  Distal Left pulmonary artery 16/1    We stopped the tPA infusion and removed the lysis catheters  Interventional radiology RICHARD Arevalo then removed the sheaths and hemostasis was obtained by manual compression  Lysis is completed  Excellent reduction in pulmonary artery pressures  Plan:  - patient does not need to remain in the ICU from IR perspective  - initiate weight based heparin, I will place the order, no bolus initially  - will need continuous anticoagulation for at least 3-6 months  - recommend repeat echocardiogram before discharge to assess for degree of RV/cardiac dysfunction    Call IR with any concerns  Thank you for allowing us to assist this patient

## 2020-01-24 NOTE — ASSESSMENT & PLAN NOTE
Lab Results   Component Value Date    HGBA1C 6 7 (H) 01/21/2020       Recent Labs     01/23/20  0634 01/23/20  1242 01/23/20  1731 01/23/20  2111   POCGLU 133 124 102 100       Blood Sugar Average: Last 72 hrs:  (P) 114 75     · Continue SSI

## 2020-01-24 NOTE — SOCIAL WORK
Met with patient and her niece, Belkis Son, to complete assessment and explain role of CM  The patient lives alone in a ranch style condo with no LOREN the home  She has a long walkway and 2 steps from her parking area and has been unable to walk this since her NWB status in 12/19  Patient reports he has no [facility] services and no past MD or D&A treatment  She was using a wheeled walker and knee scooter at home and also has a shower chair  The patient drives and is employed and works from home  Patient has an Advance Directive and was asked to provide a copy  Her son, Rusty Villegas, is POA but her sister, Mali Colon is primary contact, [phone]  Patient's daughter, Bob Olivo [phone],  has also called asking for call from CM and patient consents to this contact  PCP is dr Lucsa Akers  The patient has prescription coverage and uses [address]  CM reviewed d/c planning process including the following: identifying help at home, patient preference for d/c planning needs, Discharge Lounge, [facility] Meds to Bed program, availability of treatment team to discuss questions or concerns patient and/or family may have regarding understanding medications and recognizing signs and symptoms once discharged  CM also encouraged patient to follow up with all recommended appointments after discharge  Patient advised of importance for patient and family to participate in managing patients medical well being  Patient/caregiver received discharge checklist  Content reviewed  Patient/caregiver encouraged to participate in discharge plan of care prior to discharge home  Call placed to daughter, Robyn Chang and spoke to her and her brother, Gilda Cordero  Answered questions about community resources available and explained patient will be evaluated by PT and OT for rehab needs  Family is all very concerned because patient is not truthful about her functional status even before her foot injury   She is sedentary at home and does not ask for assistance  Her home is in poor condition due to patient's inability to clean and care for herself  Family is working on the home before patient returns home  They are interested in her having nutrition consult and ask about future outpatient care  explained we will focus on immediate needs but support family in planning ahead  They are looking into private duty aide services but patient is resistive to this due to cost and need to hire for minimum hours  CM discussed with her children that the patient will need to agree to plans and services and they express understanding

## 2020-01-24 NOTE — PROGRESS NOTES
Progress Note -   ICU Transfer to Step down/med  surg  Max Frots 62 y o  female MRN: [id number]    Unit/Bed#: [facility] 515-01 Encounter: [id number]      Code Status: Level 1 - Full Code    Date of ICU admission: 1/22/2020    Reason for ICU adm: Pulmonary embolism ([facility] [address] ) [I26 [id number]]    Active problems:   Patient Active Problem List   Diagnosis    Thickened endometrium    PMB (postmenopausal bleeding)    Morbid obesity with BMI of 50 0-59 9, adult ([facility] [address] )    Cervical stenosis (uterine cervix)    Postoperative state    Acute pulmonary embolism with acute cor pulmonale (HCC)    Type 2 diabetes mellitus without complication, without long-term current use of insulin ([facility] [address] )    Essential hypertension    Morbid obesity due to excess calories ([facility] [address] )    Pulmonary hypertension ([facility] [address] )    Acute respiratory failure with hypoxia ([facility] [address] )    [facility]       Summary of clinical course:     62 y o  female with past medical history of DMII and hypertension who [facility] with several day history of dyspnea on exertion  She had a recent (12/16/19) foot surgery and has been relatively immobile as she is non-weight bearing  Workup included CTA of her chest that showed large bilateral pulmonary pulmonary emboli with RV/LV ratio of 1 66  She was placed on a heparin gtt  Echocardiogram evidence of RV strain as right ventricle was markedly dilated with evidence of pulmonary hypertension  He was therefore transferred to [facility] for IR directed pulmonary lysis  1/23/2020 arrived to the ICU s/p right and left EKOS catheter placement with continuous TPA and heparin infusion  She is hemodynamically stable without complaints  1/24/2020 - tPA infusion, lysis catheters and R IJ sheaths removed  by Interventional Radiology  All critical care needs addressed at this time  Patient is hemodynamically stable and placed on weight based heparin infusion    Will transferred to general medicine service with telemetry monitoring  Case discussed with [facility] attending Dr Alston Headings  Recent or scheduled procedures:     2020 - s/p catheter directed thrombolysis for submassive pulmonary embolism  Recent diagnostics: Xr Chest Portable    Result Date: 2020  Impression: 1  Cardiomegaly; otherwise no acute cardiopulmonary disease  2  Thrombolytic catheters as described  Workstation performed: [id number]     Ct Head Wo Contrast    Result Date: 2020  Impression: No acute intracranial abnormality  Workstation performed: [id number]     Ir Pulmonary Lysis    Result Date: 2020  Impression: Impression: Placement of bilateral EKOS pulmonary thrombolysis infusion catheters into the left and right lower lobe pulmonary arteries  Severe pulmonary hypertension  Plan: Critical care monitoring 0 5 mg/h TPA through each lysis catheter  Nonweight based heparin  Monitoring of PTT, hemoglobin, neuro status, and fibrinogen  Call IR for any concerns or clinical changes  IR will follow  Workstation performed: [id number]       Neuro: GCS 15  Pain:  Analgesia as needed  CV:RRR, normotensive  Pulm:  Maintain on room air no acute respiratory distress  Encourage Incentive Spirometry  Titrate O2 to 92-94%  GI:  Diet regular diet  :  Monitor Intake and Output  Skin: Encourage turning every 2 hours  F/E/N:  Follow-up A m  Labs    Heparin infusion for therapeutic APTT  Activity: OOB/PT    Hospital discharge plannin-2 days

## 2020-01-24 NOTE — ASSESSMENT & PLAN NOTE
· Wean nasal cannula as able for goal SpO2 > 92%  · Encourage incentive spirometry, pulmonary toilet

## 2020-01-24 NOTE — PROGRESS NOTES
Progress Note - Jacklyn Rangel 1960, 62 y o  female MRN: [id number]    Unit/Bed#: [facility] 700-99 Encounter: [id number]    Primary Care Provider: Jeremy Vann MD   Date and time admitted to hospital: 1/22/2020 10:22 PM      * Acute pulmonary embolism with acute cor pulmonale [facility])  Assessment & Plan  Patient presented to [facility] with several day history of shortness of breath with activity  Workup included a CTA of the chest which revealed large bilateral pulmonary emboli  RV/LV ratio of 1 66  She was placed on a heparin gtt  Echocardiogram consistent with PE as right ventricle was markedly dilated with evidence of pulmonary hypertension  She was transferred to [facility] for IR intervention     · 1/23: IR placed right and left EKOS catheters with TPA for directed pulmonary lysis   · IR following - for recheck today  · PE likely secondary to immobility after recent foot surgery, however hypercoagulability workup may be beneficial as an outpatient   · Continue to monitor closely  · Patient denies shortness of breath on nasal cannula  · Continue on tele     DVT (deep venous thrombosis) (HCC)  Assessment & Plan  · Lower extremity duplex confirms left leg "Acute deep vein thrombosis is noted in the distal femoral vein, popliteal vein,  and in the visualized portions of the posterior tibial and peroneal veins"  · Recent surgical history on left foot (12/16/19), patient is currently non-weight bearing   · Continue heparin infusion     Type 2 diabetes mellitus without complication, without long-term current use of insulin [facility])  Assessment & Plan  Lab Results   Component Value Date    HGBA1C 6 7 (H) 01/21/2020       Recent Labs     01/23/20  0634 01/23/20  1242 01/23/20  1731 01/23/20  2111   POCGLU 133 124 102 100       Blood Sugar Average: Last 72 hrs:  (P) 114 75     · Continue SSI     Acute respiratory failure with hypoxia (HCC)  Assessment & Plan  · Wean nasal cannula as able for goal SpO2 > 92%  · Encourage incentive spirometry, pulmonary toilet      ----------------------------------------------------------------------------------------  HPI/24hr events: IR yesterday for lysis  No acute events overnight  Disposition: Continue Critical Care   Code Status: Level 1 - Full Code  ---------------------------------------------------------------------------------------  SUBJECTIVE  "I feel okay"    Review of Systems  Review of systems was reviewed and negative unless stated above in HPI/24-hour events   ---------------------------------------------------------------------------------------  OBJECTIVE    Vitals   Vitals:    20 0000 20 0200 20 0300 20 0416   BP: 146/71 136/76 (!) 152/108 159/100   BP Location:    Left arm   Pulse: 70 70 70 72   Resp: 22 (!) 23 21 (!) 23   Temp:       TempSrc:       SpO2: 91% 94% 92% 93%   Weight:       Height:         Temp (24hrs), Av °F (36 7 °C), Min:97 9 °F (36 6 °C), Max:98 °F (36 7 °C)  Current: Temperature: 98 °F (36 7 °C)        SpO2: SpO2: 93 %  O2 Flow Rate (L/min): 4 L/min    Physical Exam   Constitutional: She is oriented to person, place, and time  No distress  Nasal cannula in place  HENT:   Head: Normocephalic and atraumatic  Eyes: Pupils are equal, round, and reactive to light  Neck: Neck supple  Cardiovascular: Normal rate, regular rhythm and normal heart sounds  Exam reveals no gallop and no friction rub  No murmur heard  Pulses:       Radial pulses are 2+ on the right side, and 2+ on the left side  Dorsalis pedis pulses are 2+ on the right side, and 2+ on the left side  Pulmonary/Chest: Effort normal and breath sounds normal  No stridor  No respiratory distress  She has no wheezes  She has no rhonchi  She has no rales  She exhibits no tenderness  Abdominal: Soft  Bowel sounds are normal  She exhibits no distension  There is no tenderness  Neurological: She is alert and oriented to person, place, and time  Skin: Skin is warm, dry and intact  She is not diaphoretic  Invasive/non-invasive ventilation settings   Respiratory    Lab Data (Last 4 hours)    None         O2/Vent Data (Last 4 hours)    None                Laboratory and Diagnostics:  Results from last 7 days   Lab Units 20  0026 20  18220  1243 20  0339 20  0422 20  1247   WBC Thousand/uL 8 54 8 18 8 64 11 31* 11 79* 13 45*   HEMOGLOBIN g/dL 13 3 13 6 14 0 14 4 14 7 15 7*   HEMATOCRIT % 41 7 42 5 42 9 45 0 44 9 48 4*   PLATELETS Thousands/uL 96* 101* 107* 122* 100* 109*   NEUTROS PCT %  --   --   --   --   --  72   MONOS PCT %  --   --   --   --   --  9     Results from last 7 days   Lab Units 20  0338 20  0422 20  1247   SODIUM mmol/L 140 142 144   POTASSIUM mmol/L 4 6 3 5 4 4   CHLORIDE mmol/L 104 102 103   CO2 mmol/L 31 27 32   ANION GAP mmol/L 5 13 9   BUN mg/dL 17 18 25   CREATININE mg/dL 0 82 0 89 1 09   CALCIUM mg/dL 8 9 8 9 9 3   GLUCOSE RANDOM mg/dL 126 119 117     Results from last 7 days   Lab Units 20  0338 20  0337   MAGNESIUM mg/dL 2 4  --    PHOSPHORUS mg/dL  --  5 2*      Results from last 7 days   Lab Units 20  0026 20  18220  1243 20  0319 20  1831 20  1109 20  0413  20  1247   INR   --   --   --   --   --   --   --   --  1 22*   PTT seconds 35 34 63* 57* 96* 55* 69*   < > 27    < > = values in this interval not displayed  Results from last 7 days   Lab Units 20  1247   TROPONIN I ng/mL 0 03       Imagin/80 Echo: Systolic function was normal by visual assessment  Ejection fraction was estimated to be 60 %  Although no diagnostic regional wall motion abnormality was identified, this possibility cannot be completely excluded on the basis of this study  Wall thickness was mildly to moderately increased  There was mild concentric hypertrophy    Doppler parameters were consistent with abnormal left ventricular relaxation (grade 1 diastolic dysfunction)  RIGHT VENTRICLE:  The ventricle was markedly dilated  The RV at the tricuspid annulus measures 6 7 cm, mid RV 5 9 cm, and longitudinal diameter 7 7cm  No thrombus is visualized in the RV  Systolic function was low normal by TAPSE measurement, however visually with Definity ultrasound contrast the RV free wall appears moderately to severely hypokinetic  Given the degree of RV dilation, tricupid regurgitation, and abnormal  ventricular septal motion this is stongly suggestive of acute RV pressure and volume overload  I have personally reviewed pertinent reports  Intake and Output  I/O       01/22 0701 - 01/23 0700 01/23 0701 - 01/24 0700    P  O  240     I V  (mL/kg) 137 3 (0 9) 1269 4 (8 3)    IV Piggyback  320    Total Intake(mL/kg) 377 3 (2 5) 1589 4 (10 4)    Urine (mL/kg/hr) 225 30 (0)    Blood  10    Total Output 225 40    Net +152 3 +1549 4          Unmeasured Urine Occurrence  1 x          Height and Weights   Height: 5' 8" (172 7 cm)  IBW: 63 9 kg  Body mass index is 51 29 kg/m²  Weight (last 2 days)     Date/Time   Weight    01/23/20 0000   (!) 153 (337 3)                Nutrition       Diet Orders   (From admission, onward)             Start     Ordered    01/23/20 1711  Diet Clear Liquid  Diet effective now     Question Answer Comment   Diet Type Clear Liquid    RD to adjust diet per protocol?  Yes        01/23/20 1710    01/22/20 2258  Room Service  Once     Question:  Type of Service  Answer:  Room Service-Appropriate    01/22/20 2257                  Active Medications  Scheduled Meds:    Current Facility-Administered Medications:  acetaminophen 650 mg Oral Q6H PRN Mason Crews, DO    alteplase IV (IR) 0 5 mg/hr Intracatheter Continuous Ricardo Steinberg MD Last Rate: 0 5 mg/hr (01/24/20 0100)   alteplase IV (IR) 0 5 mg/hr Intracatheter Continuous Ricardo Steinberg MD Last Rate: 0 5 mg/hr (01/24/20 0100)   ascorbic acid 500 mg Oral Daily Reinaldo Cherri Wilhelm,     calcium carbonate 1 tablet Oral Daily With Breakfast Crystal Michael DO    heparin (porcine) 500 Units/hr Intravenous Continuous Lannis Patch, MD Last Rate: 700 Units/hr (01/24/20 0248)   heparin 20 Units/hr Intravenous Continuous Lannis Patch, MD Last Rate: 20 Units/hr (01/23/20 1120)   heparin 20 Units/hr Intravenous Continuous Lannis Patch, MD Last Rate: 20 Units/hr (01/23/20 1121)   hydrochlorothiazide 50 mg Oral Daily Crystal Michael DO    insulin lispro 2-12 Units Subcutaneous TID AC Crystal Michael, DO    multivitamin-minerals 1 tablet Oral Daily Crystal Michael DO    QUEtiapine 25 mg Oral HS Damir Loving PA-C    sodium chloride 35 mL/hr Intravenous Continuous Lannis Patch, MD Last Rate: 35 mL/hr (01/23/20 1145)   sodium chloride 35 mL/hr Intravenous Continuous Lannis Patch, MD Last Rate: 35 mL/hr (01/23/20 1145)   traMADol 50 mg Oral Q6H PRN JULISA Coffey    zinc gluconate 50 mg Oral Daily Crystal Michael DO      Continuous Infusions:    alteplase IV (IR) 0 5 mg/hr Last Rate: 0 5 mg/hr (01/24/20 0100)   alteplase IV (IR) 0 5 mg/hr Last Rate: 0 5 mg/hr (01/24/20 0100)   heparin (porcine) 500 Units/hr Last Rate: 700 Units/hr (01/24/20 0248)   heparin 20 Units/hr Last Rate: 20 Units/hr (01/23/20 1120)   heparin 20 Units/hr Last Rate: 20 Units/hr (01/23/20 1121)   sodium chloride 35 mL/hr Last Rate: 35 mL/hr (01/23/20 1145)   sodium chloride 35 mL/hr Last Rate: 35 mL/hr (01/23/20 1145)     PRN Meds:     acetaminophen 650 mg Q6H PRN   traMADol 50 mg Q6H PRN     ---------------------------------------------------------------------------------------  Advance Directive and Living Will:      Power of :    POLST:    ---------------------------------------------------------------------------------------  Counseling / Coordination of Care  Total Critical Care time spent 0 minutes excluding procedures, teaching and family updates        Damir Loving PA-C

## 2020-01-24 NOTE — OCCUPATIONAL THERAPY NOTE
Occupational Therapy Cancellation Note    Orders received and chart reviewed  Pt currently has active bed rest orders  Will continue to follow to be seen for OT evaluation as appropriate/when medically cleared           Jessica Lopez MS, OTR/L

## 2020-01-24 NOTE — PROGRESS NOTES
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  - patient presenting with worsening shortness of breath, dyspnea on exertion, fatigue  - CTA chest with large bilateral central PE with calculated RV/LV ratio 1 66   - elevated D-dimer, proBNP  - subsequent echo revealed severe RV dilation and RA dilation with estimated peak PA pressure 73mmHG, consistent with severe pulmonary hypertension  - patient is now day 1 status post IR catheter directed thrombolysis with EKOS, likely plan to remove catheters/tpa today and continue on heparin  - Will need to discuss eventual transition to oral anticoagulation  Will be difficult given her BMI of 51 to place on Xarelto or Eliquis  May be left with Coumadin as the only option  Will discuss with pharmacy  - repeat echo in 2 days to re assess above   - lower extremity duplex noted as below  - titrate nasal cannula as tolerated, pulmonary toilet, maintain SpO2 >90 %  - this is a provoked PE/DVT due to patient's recent foot surgery and a immobility since  She will need approximately 6 months of anticoagulation with re-evaluation and repeat echo this admission as well as as outpatient     2  Left lower extremity distal femoral, popliteal, posterior tibial, peroneal vein DVT   - an acute DVT of the distal femoral vein, popliteal vein, portions of the posterior tibial, and peroneal veins in the left lower extremity  No DVT in right lower extremity  - continue heparin infusion  - provoked in the setting of recent surgery, will need at least 6 months of anticoagulation     3   Suspected obstructive sleep apnea  - patient with significant signs/symptoms of obstructive sleep apnea  - agree with outpatient sleep study, will likely need CPAP in the future  - patient should follow up outpatient for this     4  Type 2 diabetes mellitus  - A1c noted 6 7%  - continue insulin sliding scale, diabetic diet     5  Hypertension  -  on thiazide per primary team    Subjective:   Patient seen examined at bedside  Underwent IR catheter directed thrombolysis with EKOS yesterday for acute submassive PE  She states that her shortness of breath and dyspnea on exertion are much improved  Denies significant cough  Mild discomfort over site of right IJ catheter  Denies fever, chills, nausea, vomiting, chest pain  She is currently saturating well on 4 L nasal cannula  She does have continuous heparin, along with tPA and EKOS running  Review of Systems   Constitutional: Positive for activity change, appetite change and fatigue  Negative for chills, fever and unexpected weight change  HENT: Negative for congestion, rhinorrhea, sneezing and sore throat  Respiratory: Positive for chest tightness and shortness of breath  Negative for cough and wheezing  Dyspnea on exertion   Cardiovascular: Positive for leg swelling  Negative for chest pain and palpitations  Gastrointestinal: Negative for abdominal pain, constipation, diarrhea, nausea and vomiting  Genitourinary: Negative for dysuria  Musculoskeletal: Negative for arthralgias  Neurological: Negative for dizziness, syncope, light-headedness and numbness  Snoring       Objective:     Vitals:    01/24/20 0601 01/24/20 0700 01/24/20 0800 01/24/20 0900   BP: 170/98 154/95 149/99 163/91   BP Location:       Pulse: 70 74 72 80   Resp: (!) 25 (!) 25 (!) 26 (!) 27   Temp:  (!) 97 4 °F (36 3 °C)     TempSrc:  Oral     SpO2: (!) 89% (!) 84% (!) 89% 98%   Weight:       Height:               Intake/Output Summary (Last 24 hours) at 1/24/2020 1040  Last data filed at 1/24/2020 8336  Gross per 24 hour   Intake 2851 09 ml   Output 230 ml   Net 2621 09 ml         Physical Exam   Constitutional: She is oriented to person, place, and time   She appears well-developed and well-nourished  No distress  Morbidly obese    HENT:   Head: Normocephalic and atraumatic  Mouth/Throat: Oropharynx is clear and moist  No oropharyngeal exudate  Right IJ catheter in place, no surrounding erythema  On nasal cannula  Large neck circumference   Eyes: EOM are normal  No scleral icterus  Cardiovascular: Normal rate, regular rhythm and normal heart sounds  No murmur heard  Pulmonary/Chest: Effort normal  No respiratory distress  She has no wheezes  Diminished breath sounds secondary to body habitus   Abdominal: Soft  Bowel sounds are normal  She exhibits no distension  There is no tenderness  Musculoskeletal: She exhibits edema (Trace bilateral lower extremity edema)  Left foot in brace   Neurological: She is alert and oriented to person, place, and time  Skin: She is not diaphoretic  Labs: I have personally reviewed pertinent lab results  Results from last 7 days   Lab Units 01/24/20 0531 01/24/20 0026 01/23/20 1821 01/21/20  1247   WBC Thousand/uL 8 09 8 54 8 18   < > 13 45*   HEMOGLOBIN g/dL 13 7 13 3 13 6   < > 15 7*   HEMATOCRIT % 42 7 41 7 42 5   < > 48 4*   PLATELETS Thousands/uL 95* 96* 101*   < > 109*   NEUTROS PCT %  --   --   --   --  72   MONOS PCT %  --   --   --   --  9    < > = values in this interval not displayed  Results from last 7 days   Lab Units 01/24/20  0530 01/23/20 0338 01/22/20  0422   POTASSIUM mmol/L 3 8 4 6 3 5   CHLORIDE mmol/L 106 104 102   CO2 mmol/L 31 31 27   BUN mg/dL 11 17 18   CREATININE mg/dL 0 64 0 82 0 89   CALCIUM mg/dL 8 5 8 9 8 9     Results from last 7 days   Lab Units 01/23/20  0338   MAGNESIUM mg/dL 2 4     Results from last 7 days   Lab Units 01/23/20  0337   PHOSPHORUS mg/dL 5 2*      Results from last 7 days   Lab Units 01/24/20  0530 01/24/20  0026 01/23/20 1821 01/21/20  1247   INR   --   --   --   --  1 22*   PTT seconds 38* 35 34   < > 27    < > = values in this interval not displayed           0 Lab Value Date/Time    TROPONINI 0 03 01/21/2020 1247     Imaging and other studies: I have personally reviewed pertinent reports  and I have personally reviewed pertinent films in PACS  CTA chest 1/21/20   large bilateral central PE with calculated RV/LV ratio 1 66       Lower extremity duplex 1/22  an acute DVT of the distal femoral vein, popliteal vein, portions of the posterior tibial, and peroneal veins in the left lower extremity  No DVT in right lower extremity     Pulmonary function testing:  None     EKG, Pathology, and Other Studies: I have personally reviewed pertinent reports      Echo 01/22/2020  EF 64%, grade 1 diastolic dysfunction, markedly dilated right ventricle and dilated right atrium with peak PA pressure 73mmHg, consistent with severe pulmonary hypertension        Sudha Rincon MD  Pulmonary & Critical Care Fellow, [facility]

## 2020-01-25 LAB
ANION GAP SERPL CALCULATED.3IONS-SCNC: 4 MMOL/L (ref 4–13)
APTT PPP: 72 SECONDS (ref 23–37)
APTT PPP: 78 SECONDS (ref 23–37)
B2 GLYCOPROT1 IGA SER-ACNC: <9 GPI IGA UNITS (ref 0–25)
B2 GLYCOPROT1 IGG SER-ACNC: <9 GPI IGG UNITS (ref 0–20)
B2 GLYCOPROT1 IGM SER-ACNC: <9 GPI IGM UNITS (ref 0–32)
BUN SERPL-MCNC: 11 MG/DL (ref 5–25)
CALCIUM SERPL-MCNC: 8.7 MG/DL (ref 8.3–10.1)
CHLORIDE SERPL-SCNC: 105 MMOL/L (ref 100–108)
CO2 SERPL-SCNC: 30 MMOL/L (ref 21–32)
CREAT SERPL-MCNC: 0.66 MG/DL (ref 0.6–1.3)
ERYTHROCYTE [DISTWIDTH] IN BLOOD BY AUTOMATED COUNT: 13 % (ref 11.6–15.1)
GFR SERPL CREATININE-BSD FRML MDRD: 96 ML/MIN/1.73SQ M
GLUCOSE SERPL-MCNC: 111 MG/DL (ref 65–140)
GLUCOSE SERPL-MCNC: 120 MG/DL (ref 65–140)
GLUCOSE SERPL-MCNC: 121 MG/DL (ref 65–140)
GLUCOSE SERPL-MCNC: 133 MG/DL (ref 65–140)
GLUCOSE SERPL-MCNC: 143 MG/DL (ref 65–140)
HCT VFR BLD AUTO: 43 % (ref 34.8–46.1)
HGB BLD-MCNC: 13.6 G/DL (ref 11.5–15.4)
MAGNESIUM SERPL-MCNC: 2.3 MG/DL (ref 1.6–2.6)
MCH RBC QN AUTO: 32.5 PG (ref 26.8–34.3)
MCHC RBC AUTO-ENTMCNC: 31.6 G/DL (ref 31.4–37.4)
MCV RBC AUTO: 103 FL (ref 82–98)
PLATELET # BLD AUTO: 92 THOUSANDS/UL (ref 149–390)
PMV BLD AUTO: 10.6 FL (ref 8.9–12.7)
POTASSIUM SERPL-SCNC: 3.9 MMOL/L (ref 3.5–5.3)
PROT S ACT/NOR PPP: 116 % (ref 63–140)
PROT S ACT/NOR PPP: 153 % (ref 57–157)
PROT S PPP-ACNC: 141 % (ref 60–150)
RBC # BLD AUTO: 4.19 MILLION/UL (ref 3.81–5.12)
SODIUM SERPL-SCNC: 139 MMOL/L (ref 136–145)
WBC # BLD AUTO: 9.01 THOUSAND/UL (ref 4.31–10.16)

## 2020-01-25 PROCEDURE — 80048 BASIC METABOLIC PNL TOTAL CA: CPT | Performed by: NURSE PRACTITIONER

## 2020-01-25 PROCEDURE — 83735 ASSAY OF MAGNESIUM: CPT | Performed by: NURSE PRACTITIONER

## 2020-01-25 PROCEDURE — 85730 THROMBOPLASTIN TIME PARTIAL: CPT | Performed by: INTERNAL MEDICINE

## 2020-01-25 PROCEDURE — 85027 COMPLETE CBC AUTOMATED: CPT | Performed by: NURSE PRACTITIONER

## 2020-01-25 PROCEDURE — 99232 SBSQ HOSP IP/OBS MODERATE 35: CPT | Performed by: INTERNAL MEDICINE

## 2020-01-25 PROCEDURE — 85730 THROMBOPLASTIN TIME PARTIAL: CPT | Performed by: NURSE PRACTITIONER

## 2020-01-25 PROCEDURE — 82948 REAGENT STRIP/BLOOD GLUCOSE: CPT

## 2020-01-25 RX ORDER — DOCUSATE SODIUM 100 MG/1
100 CAPSULE, LIQUID FILLED ORAL 2 TIMES DAILY
Status: DISCONTINUED | OUTPATIENT
Start: 2020-01-25 | End: 2020-02-06 | Stop reason: HOSPADM

## 2020-01-25 RX ORDER — WARFARIN SODIUM 5 MG/1
10 TABLET ORAL
Status: COMPLETED | OUTPATIENT
Start: 2020-01-25 | End: 2020-01-25

## 2020-01-25 RX ORDER — POLYETHYLENE GLYCOL 3350 17 G/17G
17 POWDER, FOR SOLUTION ORAL DAILY PRN
Status: DISCONTINUED | OUTPATIENT
Start: 2020-01-25 | End: 2020-02-06 | Stop reason: HOSPADM

## 2020-01-25 RX ORDER — ECHINACEA PURPUREA EXTRACT 125 MG
2 TABLET ORAL
Status: DISCONTINUED | OUTPATIENT
Start: 2020-01-25 | End: 2020-02-06 | Stop reason: HOSPADM

## 2020-01-25 RX ADMIN — OXYCODONE HYDROCHLORIDE AND ACETAMINOPHEN 500 MG: 500 TABLET ORAL at 09:02

## 2020-01-25 RX ADMIN — QUETIAPINE FUMARATE 25 MG: 25 TABLET ORAL at 21:52

## 2020-01-25 RX ADMIN — WARFARIN SODIUM 10 MG: 5 TABLET ORAL at 17:03

## 2020-01-25 RX ADMIN — HYDROCHLOROTHIAZIDE 50 MG: 25 TABLET ORAL at 09:02

## 2020-01-25 RX ADMIN — Medication 50 MG: at 09:03

## 2020-01-25 RX ADMIN — HEPARIN SODIUM AND DEXTROSE 18 UNITS/KG/HR: 10000; 5 INJECTION INTRAVENOUS at 20:49

## 2020-01-25 RX ADMIN — HEPARIN SODIUM AND DEXTROSE 18 UNITS/KG/HR: 10000; 5 INJECTION INTRAVENOUS at 09:01

## 2020-01-25 RX ADMIN — CALCIUM 1 TABLET: 500 TABLET ORAL at 09:00

## 2020-01-25 RX ADMIN — DOCUSATE SODIUM 100 MG: 100 CAPSULE, LIQUID FILLED ORAL at 17:04

## 2020-01-25 RX ADMIN — POLYETHYLENE GLYCOL 3350 17 G: 17 POWDER, FOR SOLUTION ORAL at 17:07

## 2020-01-25 RX ADMIN — Medication 1 TABLET: at 09:01

## 2020-01-25 NOTE — ASSESSMENT & PLAN NOTE
Secondary to recent foot surgery and immobility  S/p right and left EKOS catheter placement with continuous TPA and heparin infusion  Catheters removed  Pulmonary following  Patient on heparin drip and will need to be placed on anticoagulation before discharge  Patient will also need repeat echo  Cardiology following

## 2020-01-25 NOTE — PROGRESS NOTES
Progress Note - Patrica Palacio 1960, 62 y o  female MRN: [id number]    Unit/Bed#: [facility] 504-01 Encounter: [id number]    Primary Care Provider: Karyn Higgins MD   Date and time admitted to hospital: 1/22/2020 10:22 PM        * Acute pulmonary embolism with acute cor pulmonale [facility])  Assessment & Plan  Secondary to recent foot surgery and immobility  S/p right and left EKOS catheter placement with continuous TPA and heparin infusion  Catheters removed  Pulmonary following  Patient on heparin drip and will need to be placed on anticoagulation before discharge  Patient will also need repeat echo  Cardiology following  Pulmonary hypertension ([address] )  Assessment & Plan  Likely 2/2 PE  Cardiology and pulmonary following  Patient will need repeat 2d echo before dc  DVT (deep venous thrombosis) (HCC)  Assessment & Plan  On heparin drip  Will need to be placed on NOAC before dc  Acute respiratory failure with hypoxia (HCC)  Assessment & Plan  2/2 PE  Stable on 2L  Wean as tolerated    Morbid obesity with BMI of 50 0-59 9, adult [facility])  Assessment & Plan  Secondary to excesses calorie intake  Pharmacologic: Heparin Drip  Mechanical VTE Prophylaxis in Place: Yes    Patient Centered Rounds: I have performed bedside rounds with nursing staff today  Discussions with Specialists or Other Care Team Provider:     Education and Discussions with Family / Patient:     Time Spent for Care: 20 minutes  More than 50% of total time spent on counseling and coordination of care as described above  Current Length of Stay: 3 day(s)    Current Patient Status: Inpatient   Certification Statement: The patient will continue to require additional inpatient hospital stay due to PE    Discharge Plan / Estimated Discharge Date: once SOB improves      Code Status: Level 1 - Full Code      Subjective:   Patient seen and examined at bedside  Patient has no new complaints      Objective:     Vitals:   Temp (24hrs), Av 1 °F (36 7 °C), Min:97 6 °F (36 4 °C), Max:99 °F (37 2 °C)    Temp:  [97 6 °F (36 4 °C)-99 °F (37 2 °C)] 98 2 °F (36 8 °C)  HR:  [72-88] 83  Resp:  [20-44] 20  BP: (105-159)/() 139/68  SpO2:  [91 %-98 %] 92 %  Body mass index is 51 29 kg/m²  Input and Output Summary (last 24 hours): Intake/Output Summary (Last 24 hours) at 2020 1250  Last data filed at 2020 1147  Gross per 24 hour   Intake 1376 99 ml   Output 1350 ml   Net 26 99 ml       Physical Exam:     Physical Exam   Constitutional: She is oriented to person, place, and time  She appears well-developed and well-nourished  HENT:   Head: Normocephalic and atraumatic  Eyes: Pupils are equal, round, and reactive to light  EOM are normal    Neck: Normal range of motion  Neck supple  No JVD present  No tracheal deviation present  No thyromegaly present  Cardiovascular: Normal rate, regular rhythm and normal heart sounds  Exam reveals no gallop and no friction rub  No murmur heard  Pulmonary/Chest: No stridor  No respiratory distress  She has no wheezes  Decreased air entry at b/l bases  Abdominal: Soft  Bowel sounds are normal  She exhibits no distension  There is no tenderness  There is no guarding  Musculoskeletal: Normal range of motion  She exhibits no edema  Neurological: She is alert and oriented to person, place, and time  Skin: Skin is warm  Vitals reviewed  Additional Data:     Labs:    Results from last 7 days   Lab Units 20  0547  20  1247   WBC Thousand/uL 9 01   < > 13 45*   HEMOGLOBIN g/dL 13 6   < > 15 7*   HEMATOCRIT % 43 0   < > 48 4*   PLATELETS Thousands/uL 92*   < > 109*   NEUTROS PCT %  --   --  72   LYMPHS PCT %  --   --  17   MONOS PCT %  --   --  9   EOS PCT %  --   --  1    < > = values in this interval not displayed       Results from last 7 days   Lab Units 20  0547   POTASSIUM mmol/L 3 9   CHLORIDE mmol/L 105   CO2 mmol/L 30   BUN mg/dL 11   CREATININE mg/dL 0 66 CALCIUM mg/dL 8 7     Results from last 7 days   Lab Units 01/21/20  1247   INR  1 22*         Recent Cultures (last 7 days):           Last 24 Hours Medication List:     Current Facility-Administered Medications:  acetaminophen 650 mg Oral Q6H PRN Ivan Short, CRNP    ascorbic acid 500 mg Oral Daily Ivan Short, CRNP    calcium carbonate 1 tablet Oral Daily With Breakfast Meredith Santo, CRNP    heparin (porcine) 3-30 Units/kg/hr (Order-Specific) Intravenous Titrated Ivan Short, CRNP Last Rate: 18 Units/kg/hr (01/25/20 0901)   heparin (porcine) 10,000 Units Intravenous PRN Ivan Short, CRNP    heparin (porcine) 5,000 Units Intravenous PRN Ivan Short, CRNP    hydrochlorothiazide 50 mg Oral Daily Meredith Santo, CRNP    insulin lispro 2-12 Units Subcutaneous TID AC Ivan Short, CRNP    multivitamin-minerals 1 tablet Oral Daily Ivan Short, CRNP    QUEtiapine 25 mg Oral HS Meredith Santo, CRNP    sodium chloride 2 spray Each Nare Q1H PRN Lyla Barrientos MD    traMADol 50 mg Oral Q6H PRN Ivan Short, CRNP    zinc gluconate 50 mg Oral Daily Ivan Short, CRNP         Today, Patient Was Seen By: Lyla Barrientos MD    ** Please Note: Dragon 360 Dictation voice to text software may have been used in the creation of this document   **

## 2020-01-25 NOTE — PLAN OF CARE
Problem: Potential for Falls  Goal: Patient will remain free of falls  Description  INTERVENTIONS:  - Assess patient frequently for physical needs  -  Identify cognitive and physical deficits and behaviors that affect risk of falls    -  [place] fall precautions as indicated by assessment   - Educate patient/family on patient safety including physical limitations  - Instruct patient to call for assistance with activity based on assessment  - Modify environment to reduce risk of injury  - Consider OT/PT consult to assist with strengthening/mobility  Outcome: Progressing     Problem: Prexisting or High Potential for Compromised Skin Integrity  Goal: Skin integrity is maintained or improved  Description  INTERVENTIONS:  - Identify patients at risk for skin breakdown  - Assess and monitor skin integrity  - Assess and monitor nutrition and hydration status  - Monitor labs   - Assess for incontinence   - Turn and reposition patient  - Assist with mobility/ambulation  - Relieve pressure over bony prominences  - Avoid friction and shearing  - Provide appropriate hygiene as needed including keeping skin clean and dry  - Evaluate need for skin moisturizer/barrier cream  - Collaborate with interdisciplinary team   - Patient/family teaching  - Consider wound care consult   Outcome: Progressing     Problem: PAIN - ADULT  Goal: Verbalizes/displays adequate comfort level or baseline comfort level  Description  Interventions:  - Encourage patient to monitor pain and request assistance  - Assess pain using appropriate pain scale  - Administer analgesics based on type and severity of pain and evaluate response  - Implement non-pharmacological measures as appropriate and evaluate response  - Consider cultural and social influences on pain and pain management  - Notify physician/advanced practitioner if interventions unsuccessful or patient reports new pain  Outcome: Progressing     Problem: INFECTION - ADULT  Goal: Absence or prevention of progression during hospitalization  Description  INTERVENTIONS:  - Assess and monitor for signs and symptoms of infection  - Monitor lab/diagnostic results  - Monitor all insertion sites, i e  indwelling lines, tubes, and drains  - Monitor endotracheal if appropriate and nasal secretions for changes in amount and color  - [place] appropriate cooling/warming therapies per order  - Administer medications as ordered  - Instruct and encourage patient and family to use good hand hygiene technique  - Identify and instruct in appropriate isolation precautions for identified infection/condition  Outcome: Progressing     Problem: SAFETY ADULT  Goal: Maintain or return to baseline ADL function  Description  INTERVENTIONS:  -  Assess patient's ability to carry out ADLs; assess patient's baseline for ADL function and identify physical deficits which impact ability to perform ADLs (bathing, care of mouth/teeth, toileting, grooming, dressing, etc )  - Assess/evaluate cause of self-care deficits   - Assess range of motion  - Assess patient's mobility; develop plan if impaired  - Assess patient's need for assistive devices and provide as appropriate  - Encourage maximum independence but intervene and supervise when necessary  - Involve family in performance of ADLs  - Assess for home care needs following discharge   - Consider OT consult to assist with ADL evaluation and planning for discharge  - Provide patient education as appropriate  Outcome: Progressing  Goal: Maintain or return mobility status to optimal level  Description  INTERVENTIONS:  - Assess patient's baseline mobility status (ambulation, transfers, stairs, etc )    - Identify cognitive and physical deficits and behaviors that affect mobility  - Identify mobility aids required to assist with transfers and/or ambulation (gait belt, sit-to-stand, lift, walker, cane, etc )  - [place] fall precautions as indicated by assessment  - Record patient progress and toleration of activity level on Mobility SBAR; progress patient to next Phase/Stage  - Instruct patient to call for assistance with activity based on assessment  - Consider rehabilitation consult to assist with strengthening/weightbearing, etc   Outcome: Progressing     Problem: DISCHARGE PLANNING  Goal: Discharge to home or other facility with appropriate resources  Description  INTERVENTIONS:  - Identify barriers to discharge w/patient and caregiver  - Arrange for needed discharge resources and transportation as appropriate  - Identify discharge learning needs (meds, wound care, etc )  - Arrange for interpretive services to assist at discharge as needed  - Refer to Case Management Department for coordinating discharge planning if the patient needs post-hospital services based on physician/advanced practitioner order or complex needs related to functional status, cognitive ability, or social support system  Outcome: Progressing     Problem: Knowledge Deficit  Goal: Patient/family/caregiver demonstrates understanding of disease process, treatment plan, medications, and discharge instructions  Description  Complete learning assessment and assess knowledge base    Interventions:  - Provide teaching at level of understanding  - Provide teaching via preferred learning methods  Outcome: Progressing     Problem: CARDIOVASCULAR - ADULT  Goal: Maintains optimal cardiac output and hemodynamic stability  Description  INTERVENTIONS:  - Monitor I/O, vital signs and rhythm  - Monitor for S/S and trends of decreased cardiac output  - Administer and titrate ordered vasoactive medications to optimize hemodynamic stability  - Assess quality of pulses, skin color and temperature  - Assess for signs of decreased coronary artery perfusion  - Instruct patient to report change in severity of symptoms  Outcome: Progressing  Goal: Absence of cardiac dysrhythmias or at baseline rhythm  Description  INTERVENTIONS:  - Continuous cardiac monitoring, vital signs, obtain 12 lead EKG if ordered  - Administer antiarrhythmic and heart rate control medications as ordered  - Monitor electrolytes and administer replacement therapy as ordered  Outcome: Progressing     Problem: RESPIRATORY - ADULT  Goal: Achieves optimal ventilation and oxygenation  Description  INTERVENTIONS:  - Assess for changes in respiratory status  - Assess for changes in mentation and behavior  - Position to facilitate oxygenation and minimize respiratory effort  - Oxygen administered by appropriate delivery if ordered  - Initiate smoking cessation education as indicated  - Encourage broncho-pulmonary hygiene including cough, deep breathe, Incentive Spirometry  - Assess the need for suctioning and aspirate as needed  - Assess and instruct to report SOB or any respiratory difficulty  - Respiratory Therapy support as indicated  Outcome: Progressing     Problem: Nutrition/Hydration-ADULT  Goal: Nutrient/Hydration intake appropriate for improving, restoring or maintaining nutritional needs  Description  Monitor and assess patient's nutrition/hydration status for malnutrition  Collaborate with interdisciplinary team and initiate plan and interventions as ordered  Monitor patient's weight and dietary intake as ordered or per policy  Utilize nutrition screening tool and intervene as necessary  Determine patient's food preferences and provide high-protein, high-caloric foods as appropriate       INTERVENTIONS:  - Monitor oral intake, urinary output, labs, and treatment plans  - Assess nutrition and hydration status and recommend course of action  - Evaluate amount of meals eaten  - Assist patient with eating if necessary   - Allow adequate time for meals  - Recommend/ encourage appropriate diets, oral nutritional supplements, and vitamin/mineral supplements  - Order, calculate, and assess calorie counts as needed  - Recommend, monitor, and adjust tube feedings and TPN/PPN based on assessed needs  - Assess need for intravenous fluids  - Provide specific nutrition/hydration education as appropriate  - Include patient/family/caregiver in decisions related to nutrition  Outcome: Progressing

## 2020-01-25 NOTE — PROGRESS NOTES
Progress Note - Pulmonary   Jono Montiel 62 y o  female MRN: [id number]  Unit/Bed#: [facility] 504-01 Encounter: [id number]      Impressions    1  Provoked submassive PE s/p EKOS day 2- w/ LLE DVT following surgery on left leg  2  Provoked Left lower extremity DVT following recent ortho surgery in 12/19  3  Acute hypoxic resp failure- 2/2 PE  4  Severe pulm HTN- PASP of 73mmHg  5  Suspect RBEEKAH    Plan:    · Given BMI, can start coumadin with heparin bridge  · Will need minimum 6 months of A/C, can repeat doppler and Ddimer at completion of anticoagulation  · Repeat Cardiac echo  · Continue supp O2, wean as tolerated  · PSG as outpatient  · IS, OOB, ambulate as able  Subjective:   Patient seen and examined bedside  Offers no acute complaints, feeling well  No SOB, minimal cough, no chest pain  Review of Systems   Constitutional: Negative for chills, diaphoresis, fatigue and fever  HENT: Negative for congestion, ear pain, postnasal drip, rhinorrhea, sneezing, trouble swallowing and voice change  Eyes: Negative for redness and itching  Respiratory: Negative for apnea, cough, choking, chest tightness, shortness of breath, wheezing and stridor  Cardiovascular: Negative for chest pain, palpitations and leg swelling  Gastrointestinal: Negative for abdominal distention, abdominal pain, blood in stool, constipation, diarrhea, nausea and vomiting  Endocrine: Negative for polydipsia and polyuria  Genitourinary: Negative for dysuria and flank pain  Musculoskeletal: Negative for arthralgias, back pain and joint swelling  Skin: Negative for pallor and rash  Neurological: Negative for dizziness, syncope, weakness, light-headedness, numbness and headaches  Hematological: Negative for adenopathy  Psychiatric/Behavioral: Negative for agitation         Objective:     Vitals:    01/24/20 2312 01/25/20 0329 01/25/20 0700 01/25/20 0916   BP: 125/77 124/58 130/58    BP Location: Left arm  Left arm    Pulse: 88 85 84    Resp: 22 22 20    Temp: 99 °F (37 2 °C) 98 2 °F (36 8 °C) 97 6 °F (36 4 °C)    TempSrc: Oral Oral Oral    SpO2: 93% 93% 94% 91%   Weight:       Height:               Intake/Output Summary (Last 24 hours) at 1/25/2020 0936  Last data filed at 1/25/2020 0901  Gross per 24 hour   Intake 1382 24 ml   Output 1500 ml   Net -117 76 ml         Physical Exam   Constitutional: She is oriented to person, place, and time  No distress  Severe obesity   HENT:   Head: Normocephalic and atraumatic  Nose: Nose normal    Mouth/Throat: Oropharynx is clear and moist  No oropharyngeal exudate  Eyes: Pupils are equal, round, and reactive to light  Conjunctivae and EOM are normal  No scleral icterus  Neck: Normal range of motion  Neck supple  No JVD present  No tracheal deviation present  No thyromegaly present  Cardiovascular: Normal rate, regular rhythm, normal heart sounds and intact distal pulses  Exam reveals no gallop and no friction rub  No murmur heard  Pulmonary/Chest: Effort normal and breath sounds normal  No stridor  No respiratory distress  She has no wheezes  She has no rales  Abdominal: Soft  Bowel sounds are normal  She exhibits no distension  There is no tenderness  There is no rebound and no guarding  Musculoskeletal: Normal range of motion  She exhibits no edema or deformity  Lymphadenopathy:     She has no cervical adenopathy  Neurological: She is alert and oriented to person, place, and time  No cranial nerve deficit or sensory deficit  Skin: Skin is warm  No rash noted  She is not diaphoretic  No erythema  Psychiatric: She has a normal mood and affect  Labs: I have personally reviewed pertinent lab results    Results from last 7 days   Lab Units 01/25/20  0547 01/24/20  0531 01/24/20  0026  01/21/20  1247   WBC Thousand/uL 9 01 8 09 8 54   < > 13 45*   HEMOGLOBIN g/dL 13 6 13 7 13 3   < > 15 7*   HEMATOCRIT % 43 0 42 7 41 7   < > 48 4*   PLATELETS Thousands/uL 92* 95* 96*   < > 109*   NEUTROS PCT %  --   --   --   --  72   MONOS PCT %  --   --   --   --  9    < > = values in this interval not displayed  Results from last 7 days   Lab Units 01/25/20  0547 01/24/20  0530 01/23/20  0338   POTASSIUM mmol/L 3 9 3 8 4 6   CHLORIDE mmol/L 105 106 104   CO2 mmol/L 30 31 31   BUN mg/dL 11 11 17   CREATININE mg/dL 0 66 0 64 0 82   CALCIUM mg/dL 8 7 8 5 8 9     Results from last 7 days   Lab Units 01/25/20  0547 01/23/20  0338   MAGNESIUM mg/dL 2 3 2 4     Results from last 7 days   Lab Units 01/23/20  0337   PHOSPHORUS mg/dL 5 2*      Results from last 7 days   Lab Units 01/25/20  0547 01/25/20  0030 01/24/20  1823  01/21/20  1247   INR   --   --   --   --  1 22*   PTT seconds 78* 72* 71*   < > 27    < > = values in this interval not displayed  0   Lab Value Date/Time    TROPONINI 0 03 01/21/2020 1247       Microbiology:  None on file    Imaging and other studies: I have personally reviewed pertinent reports     and I have personally reviewed pertinent films in PACS  Cardiomegaly, small left sided effusion      Lamont Deluca MD  Pulmonary & Critical Care Fellow, [facility]

## 2020-01-25 NOTE — NUTRITION
01/25/20 1419   Recommendations/Interventions   Interventions Diet: order adjustment;MNT via Outpatient  (Suggest diet change to CCD1 cardiac   Pt would benefit from out patient diet counseling/[facility]  )

## 2020-01-26 LAB
APTT PPP: 75 SECONDS (ref 23–37)
ERYTHROCYTE [DISTWIDTH] IN BLOOD BY AUTOMATED COUNT: 12.9 % (ref 11.6–15.1)
GLUCOSE SERPL-MCNC: 106 MG/DL (ref 65–140)
GLUCOSE SERPL-MCNC: 112 MG/DL (ref 65–140)
GLUCOSE SERPL-MCNC: 116 MG/DL (ref 65–140)
GLUCOSE SERPL-MCNC: 125 MG/DL (ref 65–140)
HCT VFR BLD AUTO: 44.3 % (ref 34.8–46.1)
HGB BLD-MCNC: 14.1 G/DL (ref 11.5–15.4)
INR PPP: 1.12 (ref 0.84–1.19)
MCH RBC QN AUTO: 32.9 PG (ref 26.8–34.3)
MCHC RBC AUTO-ENTMCNC: 31.8 G/DL (ref 31.4–37.4)
MCV RBC AUTO: 103 FL (ref 82–98)
PLATELET # BLD AUTO: 112 THOUSANDS/UL (ref 149–390)
PMV BLD AUTO: 11.3 FL (ref 8.9–12.7)
PROTHROMBIN TIME: 14 SECONDS (ref 11.6–14.5)
RBC # BLD AUTO: 4.29 MILLION/UL (ref 3.81–5.12)
WBC # BLD AUTO: 9.46 THOUSAND/UL (ref 4.31–10.16)

## 2020-01-26 PROCEDURE — 99232 SBSQ HOSP IP/OBS MODERATE 35: CPT | Performed by: INTERNAL MEDICINE

## 2020-01-26 PROCEDURE — 97163 PT EVAL HIGH COMPLEX 45 MIN: CPT

## 2020-01-26 PROCEDURE — 97167 OT EVAL HIGH COMPLEX 60 MIN: CPT

## 2020-01-26 PROCEDURE — 85027 COMPLETE CBC AUTOMATED: CPT | Performed by: INTERNAL MEDICINE

## 2020-01-26 PROCEDURE — 82948 REAGENT STRIP/BLOOD GLUCOSE: CPT

## 2020-01-26 PROCEDURE — 85730 THROMBOPLASTIN TIME PARTIAL: CPT | Performed by: INTERNAL MEDICINE

## 2020-01-26 PROCEDURE — 85610 PROTHROMBIN TIME: CPT | Performed by: INTERNAL MEDICINE

## 2020-01-26 RX ORDER — WARFARIN SODIUM 5 MG/1
10 TABLET ORAL
Status: COMPLETED | OUTPATIENT
Start: 2020-01-26 | End: 2020-01-26

## 2020-01-26 RX ADMIN — QUETIAPINE FUMARATE 25 MG: 25 TABLET ORAL at 21:34

## 2020-01-26 RX ADMIN — HEPARIN SODIUM AND DEXTROSE 18 UNITS/KG/HR: 10000; 5 INJECTION INTRAVENOUS at 20:03

## 2020-01-26 RX ADMIN — OXYCODONE HYDROCHLORIDE AND ACETAMINOPHEN 500 MG: 500 TABLET ORAL at 08:09

## 2020-01-26 RX ADMIN — Medication 50 MG: at 08:09

## 2020-01-26 RX ADMIN — Medication 1 TABLET: at 08:09

## 2020-01-26 RX ADMIN — HEPARIN SODIUM AND DEXTROSE 18 UNITS/KG/HR: 10000; 5 INJECTION INTRAVENOUS at 08:10

## 2020-01-26 RX ADMIN — HYDROCHLOROTHIAZIDE 50 MG: 25 TABLET ORAL at 08:09

## 2020-01-26 RX ADMIN — WARFARIN SODIUM 10 MG: 5 TABLET ORAL at 17:07

## 2020-01-26 RX ADMIN — CALCIUM 1 TABLET: 500 TABLET ORAL at 08:09

## 2020-01-26 NOTE — ASSESSMENT & PLAN NOTE
Secondary to recent foot surgery and immobility  S/p right and left EKOS catheter placement with continuous TPA and heparin infusion  Catheters removed  Pulmonary following  Patient on heparin drip bridging to coumadin  INR not therapeutic  Will give [id number] PO tonight

## 2020-01-26 NOTE — PLAN OF CARE
Problem: OCCUPATIONAL THERAPY ADULT  Goal: Performs self-care activities at highest level of function for planned discharge setting  See evaluation for individualized goals  Description  Treatment Interventions: ADL retraining, Functional transfer training, UE strengthening/ROM, Endurance training, Patient/family training, Equipment evaluation/education, Compensatory technique education, Continued evaluation, Energy conservation, Activityengagement          See flowsheet documentation for full assessment, interventions and recommendations  Note:   Limitation: Decreased ADL status, Decreased endurance, Decreased high-level ADLs  Prognosis: Good  Assessment: Pt is a 62 y o  female admitted to [facility] on 1/22/2020 w/ acute pulmonary embolism s/p pulmonary lysis on 1/23/2020  Comorbidities include a h/o type 2 DM, DVT, pulmonary hypertension, and essential hypertension  Pt with active OT orders and up with assistance  orders  Pt is NWB on LLE with heel touch for transfers only per podiatry  Pt resides alone in a ranch style home with no LOREN  Pt reports that there are 20 steps from the car to the house but that her family drives her up to the door, avoiding all steps  Pt has limited support upon d/c  Pt was I w/  ADLS and IADLS, (+) drove, & required use of knee scooter since L LE injury DME PTA  Currently pt is min A for functional transfers, and min A for ADLS overall  Pt is limited at this time 2*: endurance, activity tolerance, functional mobility, unsupportive home environment and decreased I w/ ADLS/IADLS  The following Occupational Performance Areas to address include: bathing/shower, dressing, functional mobility and clothing management  Based on the aforementioned OT evaluation, functional performance deficits, and assessments, pt has been identified as a high complexity evaluation  From OT standpoint, anticipate d/c home OT   Pt to continue to benefit from acute immediate OT services to address the following goals 3-5x/week to  w/in 7-10 days:        OT Discharge Recommendation: Home OT

## 2020-01-26 NOTE — PHYSICAL THERAPY NOTE
Physical Therapy Evaluation     Patient's Name: Francisca Mercado    Admitting Diagnosis  Pulmonary embolism ([facility] ) [I26 [id number]]    Problem List  Patient Active Problem List   Diagnosis    Thickened endometrium    PMB (postmenopausal bleeding)    Morbid obesity with BMI of 50 0-59 9, adult ([facility] )    Cervical stenosis (uterine cervix)    Postoperative state    Acute pulmonary embolism with acute cor pulmonale (HCC)    Type 2 diabetes mellitus without complication, without long-term current use of insulin ([facility] )    Essential hypertension    Morbid obesity due to excess calories ([place] [address] )    Pulmonary hypertension ([facility] )    Acute respiratory failure with hypoxia ([place] [address] )    DVT (deep venous thrombosis) ([place] [address] )       Past Medical History  Past Medical History:   Diagnosis Date    Colon polyp     Diabetes mellitus ([place] [address] )     pre    Hypertension     Muscle weakness     elev enzymes    Seasonal allergies        Past Surgical History  Past Surgical History:   Procedure Laterality Date    APPENDECTOMY      AUGMENTATION BREAST      CERVICAL BIOPSY  W/ LOOP ELECTRODE EXCISION      COLONOSCOPY  11/2019    DILATION AND CURETTAGE OF UTERUS      HYSTEROSCOPY N/A 3/20/2018    Procedure: HYSTEROSCOPY;  Surgeon: Christopher Garcia MD;  Location:  MAIN OR;  Service: Gynecology Oncology    IR PULMONARY LYSIS  1/23/2020    ORIF FOOT FRACTURE Left 12/16/2019    Procedure: ORIF LISFRANC;  Surgeon: Richie Head DPM;  Location: [address] [place] [address] MAIN OR;  Service: Podiatry    [place] DILATION/CURETTAGE,DIAGNOSTIC N/A 3/20/2018    Procedure: DILATATION AND CURETTAGE (D&C),HYSTEROSCOPY;  Surgeon: Christopher Garcia MD;  Location:  MAIN OR;  Service: Gynecology Oncology    TUBAL LIGATION      TUBAL REANASTOMOSIS            01/26/20 1028   Note Type   Note type Eval only   Pain Assessment   Pain Assessment No/denies pain   Pain Score No Pain   Home Living   Type of Home Other (Comment)  (condo)   Home Layout One level  (0STE, 20 steps from parking area) Bathroom Shower/Tub   (sponge bathing PTA)   Bathroom Equipment Shower chair   Home Equipment Other (Comment); Walker  (kneeling scoooter)   Additional Comments Pt reports she does not have to negotiate 20 steps, her family drives up to door to pick her up  Prior Function   Level of [place] Independent with ADLs and functional mobility   Lives With Alone   ADL Assistance Independent   IADLs Independent   Falls in the last 6 months 0   Vocational Full time employment   Comments Works from home   Restrictions/Precautions   Wells [place] Bearing Precautions Per Order Yes   LLE Weight Bearing Per Order NWB (per podiatry via tiger text)   Braces or Orthoses CAM Boot  (L LE)   Other Precautions Fall Risk;WBS   General   Family/Caregiver Present No   Cognition   Overall Cognitive Status WFL   Arousal/Participation Alert   Orientation Level Oriented X4   Memory Within functional limits   Following Commands Follows all commands and directions without difficulty   RLE Assessment   RLE Assessment WFL   LLE Assessment   LLE Assessment   (not tested, WBS)   Coordination   Movements are Fluid and Coordinated 1   Bed Mobility   Supine to Sit 5  Supervision   Additional Comments Supine in bed upon PT arrival   Pt left upright in bedside chair with all needs in reach at end of therapy session     Transfers   Sit to Stand 4  Minimal assistance   Additional items Assist x 1;Verbal cues   Stand to Sit 4  Minimal assistance   Additional items Assist x 1;Verbal cues   Stand pivot 4  Minimal assistance   Additional items Assist x 1;Verbal cues   Ambulation/Elevation   Gait pattern Not appropriate   Assistive Device   (trial kneeling scooter)   Stair Management Assistance Not tested   Balance   Static Sitting Fair +   Dynamic Sitting Fair +   Static Standing Fair   Dynamic Standing Fair -   Endurance Deficit   Endurance Deficit No   Activity Tolerance   Activity Tolerance Patient tolerated treatment well   Medical Staff Made Aware OT Jc Fernandez Nurse Made Aware RN cleared pt to be seen by PT   Assessment   Prognosis Good   Problem List Decreased strength;Decreased endurance; Impaired balance;Decreased mobility;Orthopedic restrictions   Assessment Pt seen for high complexity PT evaluation due to decrease in functional mobility status compared to baseline  Pt with active PT eval/treat and up with assist orders at this time  Pt is a 62 y o  yo F who presented to [facility] with acute pulmonary embolism on 1/22/20  Pt  has a past medical history of Colon polyp, Diabetes mellitus ([address] ), Hypertension, Muscle weakness, and Seasonal allergies  Pt resides alone in [address] [place] [address] with 0STE and reports I PTA  Pt presents with decreased strength, balance, endurance that contribute to limitations in bed mobility, functional transfers, functional mobility  Pt requires supervision for bed mobility, Min A for STS and SPT with RW at this time  Pt left upright in bedside chair with all needs in reach  Pt will benefit from skilled therapy in order to address current impairments and functional limitations  PT to follow pt and recommending home with family support once medically cleared  Barriers to Discharge None   Goals   Patient Goals to go home   STG Expiration Date 02/09/20   Short Term Goal #1 1  Pt will demonstrate ability to perform all aspects of bed mobility with I in order to increase independence and decrease burden on caregivers  2  Pt will demonstrate ability to perform functional transfers with Mod I in order to increase independence and decrease burden on caregivers  3  Pt will demonstrate ability to use kneeling scooter for functional mobility 200 ft with Mod I in order to increase independence  4  Pt will demonstrate improved balance by one grade order to decrease risk of falls  5  Pt will increase b/l LE strength by 1 grade in order to increase ease of functional mobility and transfers     Plan   Treatment/Interventions Functional transfer training;LE strengthening/ROM; Therapeutic exercise; Endurance training;Patient/family training;Equipment eval/education; Bed mobility;Gait training;Spoke to nursing   PT Frequency Other (Comment)  (3-5x/wk)   Recommendation   Recommendation Home with family support   Equipment Recommended Walker   PT - OK to Discharge Yes  (once medically cleared)   Modified [place] Scale   Modified [place] Scale 3         Deana Colunga, PT, DPT

## 2020-01-26 NOTE — PLAN OF CARE
Problem: Potential for Falls  Goal: Patient will remain free of falls  Description  INTERVENTIONS:  - Assess patient frequently for physical needs  -  Identify cognitive and physical deficits and behaviors that affect risk of falls    -  [place] fall precautions as indicated by assessment   - Educate patient/family on patient safety including physical limitations  - Instruct patient to call for assistance with activity based on assessment  - Modify environment to reduce risk of injury  - Consider OT/PT consult to assist with strengthening/mobility  Outcome: Progressing     Problem: Prexisting or High Potential for Compromised Skin Integrity  Goal: Skin integrity is maintained or improved  Description  INTERVENTIONS:  - Identify patients at risk for skin breakdown  - Assess and monitor skin integrity  - Assess and monitor nutrition and hydration status  - Monitor labs   - Assess for incontinence   - Turn and reposition patient  - Assist with mobility/ambulation  - Relieve pressure over bony prominences  - Avoid friction and shearing  - Provide appropriate hygiene as needed including keeping skin clean and dry  - Evaluate need for skin moisturizer/barrier cream  - Collaborate with interdisciplinary team   - Patient/family teaching  - Consider wound care consult   Outcome: Progressing     Problem: PAIN - ADULT  Goal: Verbalizes/displays adequate comfort level or baseline comfort level  Description  Interventions:  - Encourage patient to monitor pain and request assistance  - Assess pain using appropriate pain scale  - Administer analgesics based on type and severity of pain and evaluate response  - Implement non-pharmacological measures as appropriate and evaluate response  - Consider cultural and social influences on pain and pain management  - Notify physician/advanced practitioner if interventions unsuccessful or patient reports new pain  Outcome: Progressing     Problem: INFECTION - ADULT  Goal: Absence or prevention of progression during hospitalization  Description  INTERVENTIONS:  - Assess and monitor for signs and symptoms of infection  - Monitor lab/diagnostic results  - Monitor all insertion sites, i e  indwelling lines, tubes, and drains  - Monitor endotracheal if appropriate and nasal secretions for changes in amount and color  - [place] appropriate cooling/warming therapies per order  - Administer medications as ordered  - Instruct and encourage patient and family to use good hand hygiene technique  - Identify and instruct in appropriate isolation precautions for identified infection/condition  Outcome: Progressing     Problem: SAFETY ADULT  Goal: Maintain or return to baseline ADL function  Description  INTERVENTIONS:  -  Assess patient's ability to carry out ADLs; assess patient's baseline for ADL function and identify physical deficits which impact ability to perform ADLs (bathing, care of mouth/teeth, toileting, grooming, dressing, etc )  - Assess/evaluate cause of self-care deficits   - Assess range of motion  - Assess patient's mobility; develop plan if impaired  - Assess patient's need for assistive devices and provide as appropriate  - Encourage maximum independence but intervene and supervise when necessary  - Involve family in performance of ADLs  - Assess for home care needs following discharge   - Consider OT consult to assist with ADL evaluation and planning for discharge  - Provide patient education as appropriate  Outcome: Progressing  Goal: Maintain or return mobility status to optimal level  Description  INTERVENTIONS:  - Assess patient's baseline mobility status (ambulation, transfers, stairs, etc )    - Identify cognitive and physical deficits and behaviors that affect mobility  - Identify mobility aids required to assist with transfers and/or ambulation (gait belt, sit-to-stand, lift, walker, cane, etc )  - [place] fall precautions as indicated by assessment  - Record patient progress and toleration of activity level on Mobility SBAR; progress patient to next Phase/Stage  - Instruct patient to call for assistance with activity based on assessment  - Consider rehabilitation consult to assist with strengthening/weightbearing, etc   Outcome: Progressing     Problem: DISCHARGE PLANNING  Goal: Discharge to home or other facility with appropriate resources  Description  INTERVENTIONS:  - Identify barriers to discharge w/patient and caregiver  - Arrange for needed discharge resources and transportation as appropriate  - Identify discharge learning needs (meds, wound care, etc )  - Arrange for interpretive services to assist at discharge as needed  - Refer to Case Management Department for coordinating discharge planning if the patient needs post-hospital services based on physician/advanced practitioner order or complex needs related to functional status, cognitive ability, or social support system  Outcome: Progressing     Problem: Knowledge Deficit  Goal: Patient/family/caregiver demonstrates understanding of disease process, treatment plan, medications, and discharge instructions  Description  Complete learning assessment and assess knowledge base    Interventions:  - Provide teaching at level of understanding  - Provide teaching via preferred learning methods  Outcome: Progressing     Problem: CARDIOVASCULAR - ADULT  Goal: Maintains optimal cardiac output and hemodynamic stability  Description  INTERVENTIONS:  - Monitor I/O, vital signs and rhythm  - Monitor for S/S and trends of decreased cardiac output  - Administer and titrate ordered vasoactive medications to optimize hemodynamic stability  - Assess quality of pulses, skin color and temperature  - Assess for signs of decreased coronary artery perfusion  - Instruct patient to report change in severity of symptoms  Outcome: Progressing  Goal: Absence of cardiac dysrhythmias or at baseline rhythm  Description  INTERVENTIONS:  - Continuous cardiac monitoring, vital signs, obtain 12 lead EKG if ordered  - Administer antiarrhythmic and heart rate control medications as ordered  - Monitor electrolytes and administer replacement therapy as ordered  Outcome: Progressing     Problem: RESPIRATORY - ADULT  Goal: Achieves optimal ventilation and oxygenation  Description  INTERVENTIONS:  - Assess for changes in respiratory status  - Assess for changes in mentation and behavior  - Position to facilitate oxygenation and minimize respiratory effort  - Oxygen administered by appropriate delivery if ordered  - Initiate smoking cessation education as indicated  - Encourage broncho-pulmonary hygiene including cough, deep breathe, Incentive Spirometry  - Assess the need for suctioning and aspirate as needed  - Assess and instruct to report SOB or any respiratory difficulty  - Respiratory Therapy support as indicated  Outcome: Progressing     Problem: Nutrition/Hydration-ADULT  Goal: Nutrient/Hydration intake appropriate for improving, restoring or maintaining nutritional needs  Description  Monitor and assess patient's nutrition/hydration status for malnutrition  Collaborate with interdisciplinary team and initiate plan and interventions as ordered  Monitor patient's weight and dietary intake as ordered or per policy  Utilize nutrition screening tool and intervene as necessary  Determine patient's food preferences and provide high-protein, high-caloric foods as appropriate       INTERVENTIONS:  - Monitor oral intake, urinary output, labs, and treatment plans  - Assess nutrition and hydration status and recommend course of action  - Evaluate amount of meals eaten  - Assist patient with eating if necessary   - Allow adequate time for meals  - Recommend/ encourage appropriate diets, oral nutritional supplements, and vitamin/mineral supplements  - Order, calculate, and assess calorie counts as needed  - Recommend, monitor, and adjust tube feedings and TPN/PPN based on assessed needs  - Assess need for intravenous fluids  - Provide specific nutrition/hydration education as appropriate  - Include patient/family/caregiver in decisions related to nutrition  Outcome: Progressing     Problem: GENITOURINARY - ADULT  Goal: Maintains or returns to baseline urinary function  Description  INTERVENTIONS:  - Assess urinary function  - Encourage oral fluids to ensure adequate hydration if ordered  - Administer IV fluids as ordered to ensure adequate hydration  - Administer ordered medications as needed  - Offer frequent toileting  - Follow urinary retention protocol if ordered  Outcome: Progressing

## 2020-01-26 NOTE — PLAN OF CARE
Problem: PHYSICAL THERAPY ADULT  Goal: Performs mobility at highest level of function for planned discharge setting  See evaluation for individualized goals  Description  Treatment/Interventions: Functional transfer training, LE strengthening/ROM, Therapeutic exercise, Endurance training, Patient/family training, Equipment eval/education, Bed mobility, Gait training, Spoke to nursing  Equipment Recommended: Amanda Arcos       See flowsheet documentation for full assessment, interventions and recommendations  Note:   Prognosis: Good  Problem List: Decreased strength, Decreased endurance, Impaired balance, Decreased mobility, Orthopedic restrictions  Assessment: Pt seen for high complexity PT evaluation due to decrease in functional mobility status compared to baseline  Pt with active PT eval/treat and up with assist orders at this time  Pt is a 62 y o  yo F who presented to [facility] with acute pulmonary embolism on 1/22/20  Pt  has a past medical history of Colon polyp, Diabetes mellitus ([facility] [address] ), Hypertension, Muscle weakness, and Seasonal allergies  Pt resides alone in [address] [place] [address] with 0STE and reports I PTA  Pt presents with decreased strength, balance, endurance that contribute to limitations in bed mobility, functional transfers, functional mobility  Pt requires supervision for bed mobility, Min A for STS and SPT with RW at this time  Pt left upright in bedside chair with all needs in reach  Pt will benefit from skilled therapy in order to address current impairments and functional limitations  PT to follow pt and recommending home with family support once medically cleared  Barriers to Discharge: None     Recommendation: Home with family support     PT - OK to Discharge: Yes(once medically cleared)    See flowsheet documentation for full assessment

## 2020-01-26 NOTE — PROGRESS NOTES
Progress Note - Ronaldo Adhikari 1960, 62 y o  female MRN: [id number]    Unit/Bed#: [facility] 504-01 Encounter: [id number]    Primary Care Provider: Erin Garcia MD   Date and time admitted to hospital: 2020 10:22 PM        * Acute pulmonary embolism with acute cor pulmonale [facility])  Assessment & Plan  Secondary to recent foot surgery and immobility  S/p right and left EKOS catheter placement with continuous TPA and heparin infusion  Catheters removed  Pulmonary following  Patient on heparin drip bridging to coumadin  INR not therapeutic  Will give [id number] PO tonight  Pulmonary hypertension ([address] )  Assessment & Plan  Likely 2/2 PE  Cardiology and pulmonary following  Patient will need repeat 2d echo before dc  DVT (deep venous thrombosis) (HCC)  Assessment & Plan  On heparin drip bridging to coumadin  Acute respiratory failure with hypoxia (HCC)  Assessment & Plan  2/2 PE    Morbid obesity with BMI of 50 0-59 9, adult [facility])  Assessment & Plan  Secondary to excesses calorie intake  Pharmacologic: Warfarin (Coumadin)  Mechanical VTE Prophylaxis in Place: Yes    Patient Centered Rounds: I have performed bedside rounds with nursing staff today  Discussions with Specialists or Other Care Team Provider:     Education and Discussions with Family / Patient:     Time Spent for Care: 20 minutes  More than 50% of total time spent on counseling and coordination of care as described above  Current Length of Stay: 4 day(s)    Current Patient Status: Inpatient   Certification Statement: The patient will continue to require additional inpatient hospital stay due to PE    Discharge Plan / Estimated Discharge Date: once INR therapeutic      Code Status: Level 1 - Full Code      Subjective:   Patient seen and examined at bedside  Patient has no new complaints      Objective:     Vitals:   Temp (24hrs), Av 5 °F (36 9 °C), Min:98 1 °F (36 7 °C), Max:99 8 °F (37 7 °C)    Temp:  [98 1 °F (36 7 °C)-99 8 °F (37 7 °C)] 98 4 °F (36 9 °C)  HR:  [79-92] 92  Resp:  [20] 20  BP: (108-139)/(58-75) 130/75  SpO2:  [88 %-96 %] 93 %  Body mass index is 51 29 kg/m²  Input and Output Summary (last 24 hours): Intake/Output Summary (Last 24 hours) at 1/26/2020 1125  Last data filed at 1/26/2020 0900  Gross per 24 hour   Intake 1256 88 ml   Output 2775 ml   Net -1518 12 ml       Physical Exam:     Physical Exam    Additional Data:     Labs:    Results from last 7 days   Lab Units 01/26/20  0437  01/21/20  1247   WBC Thousand/uL 9 46   < > 13 45*   HEMOGLOBIN g/dL 14 1   < > 15 7*   HEMATOCRIT % 44 3   < > 48 4*   PLATELETS Thousands/uL 112*   < > 109*   NEUTROS PCT %  --   --  72   LYMPHS PCT %  --   --  17   MONOS PCT %  --   --  9   EOS PCT %  --   --  1    < > = values in this interval not displayed       Results from last 7 days   Lab Units 01/25/20  0547   POTASSIUM mmol/L 3 9   CHLORIDE mmol/L 105   CO2 mmol/L 30   BUN mg/dL 11   CREATININE mg/dL 0 66   CALCIUM mg/dL 8 7     Results from last 7 days   Lab Units 01/26/20  0437   INR  1 12         Recent Cultures (last 7 days):           Last 24 Hours Medication List:     Current Facility-Administered Medications:  acetaminophen 650 mg Oral Q6H PRN Odilia Ringer, CRNP    ascorbic acid 500 mg Oral Daily Odilia Ringer, CRNP    calcium carbonate 1 tablet Oral Daily With Breakfast Meredith Santo, CRNP    docusate sodium 100 mg Oral BID Tatyana Avery MD    heparin (porcine) 3-30 Units/kg/hr (Order-Specific) Intravenous Titrated Odilia Ringer, CRNP Last Rate: 18 Units/kg/hr (01/26/20 0810)   heparin (porcine) 10,000 Units Intravenous PRN Odilia Ringer, CRNP    heparin (porcine) 5,000 Units Intravenous PRN Odilia Ringer, CRNP    hydrochlorothiazide 50 mg Oral Daily Meredith Santo, CRNP    insulin lispro 2-12 Units Subcutaneous TID AC Odilia Ringer, CRNP    multivitamin-minerals 1 tablet Oral Daily Meredith Santo, CRNP    polyethylene glycol 17 g Oral Daily PRN Tatyana Avery MD QUEtiapine 25 mg Oral HS JULISA Hensley    sodium chloride 2 spray Each Nare Q1H PRN Tatyana Avery MD    traMADol 50 mg Oral Q6H PRN JULISA Comer    warfarin 10 mg Oral Once (warfarin) Tatyana Avery MD    zinc gluconate 50 mg Oral Daily JULISA Comer         Today, Patient Was Seen By: Tatyana Avery MD    ** Please Note: Dragon 360 Dictation voice to text software may have been used in the creation of this document   **

## 2020-01-26 NOTE — OCCUPATIONAL THERAPY NOTE
OccupationalTherapy Evaluation     Patient Name: [place] Po[place]  RZYPH'V Date: 1/26/2020  Problem List  Principal Problem:    Acute pulmonary embolism with acute cor pulmonale ([place] [address] )  Active Problems:     Morbid obesity with BMI of 50 0-59 9, adult ([place] [address] )    Type 2 diabetes mellitus without complication, without long-term current use of insulin ([place] [address] )    Pulmonary hypertension ([place] [address] )    Acute respiratory failure with hypoxia (HCC)    DVT (deep venous thrombosis) ([place] [address] )    Past Medical History  Past Medical History:   Diagnosis Date    Colon polyp     Diabetes mellitus ([place] [address] )     pre    Hypertension     Muscle weakness     elev enzymes    Seasonal allergies      Past Surgical History  Past Surgical History:   Procedure Laterality Date    APPENDECTOMY      AUGMENTATION BREAST      CERVICAL BIOPSY  W/ LOOP ELECTRODE EXCISION      COLONOSCOPY  11/2019    DILATION AND CURETTAGE OF UTERUS      HYSTEROSCOPY N/A 3/20/2018    Procedure: HYSTEROSCOPY;  Surgeon: Kamila Perkins MD;  Location:  MAIN OR;  Service: Gynecology Oncology    IR PULMONARY LYSIS  1/23/2020    ORIF FOOT FRACTURE Left 12/16/2019    Procedure: ORIF LISFRANC;  Surgeon: Tamika Cedillo DPM;  Location: [facility] MAIN OR;  Service: Podiatry    [place] DILATION/CURETTAGE,DIAGNOSTIC N/A 3/20/2018    Procedure: DILATATION AND CURETTAGE (D&C),HYSTEROSCOPY;  Surgeon: Kamila Perkins MD;  [facility]: [facility] MAIN OR;  Service: Gynecology Oncology    TUBAL LIGATION      TUBAL REANASTOMOSIS           01/26/20 1027   Note Type   Note type Eval only   Restrictions/Precautions   Weight Bearing Precautions Per Order Yes   LLE Weight Bearing Per Order NWB  (With heel touch for transfers only per podiatry via tiger text)   Braces or Orthoses CAM Boot  (L LE)   Other Precautions Fall Risk;WBS   Pain Assessment   Pain Assessment No/denies pain   Pain Score No Pain   Home Living   Type of Home Other (Comment)  (condo)   Home Layout One level   [employer] shower  (Mainly sponge bathes since L LE injury )   [address] [place] [address]  chair   [address] [facility]  (kneeling scooter)   Additional Comments Pt reports that there are 20 steps to reach apartment but that she does not have to do them as her family drives to her door to pick her up  Prior Function   Level of [place] Independent with ADLs and functional mobility   Lives With Alone   ADL Assistance Independent   IADLs Independent   Falls in the last 6 months 0   Vocational Full time employment   Lifestyle   Autonomy Pt reports being I with ADLS, IADLS and uses knee scooter for mobility PTA  Prior to L LE injury pt mobilized without device and drove  Reciprocal Relationships Pt lives alone and reports that her children live in [facility] and are not able to assist     Service to Others Works full time from home as a     Intrinsic Gratification Playing Offees   Psychosocial   Psychosocial (WDL) WDL   Subjective   Subjective Pt reports no concerns about returning home but that her children are concerned that she is a "high fall risk"  ADL   Eating Assistance 7  Independent   Grooming Assistance 5  Supervision/Setup   UB Bathing Assistance 4  Minimal Assistance   LB Bathing Assistance 4  Minimal Assistance   UB Dressing Assistance 4  Minimal Assistance   LB Dressing Assistance 4  Minimal [address]  4  Minimal Assistance   Bed Mobility   Supine to Sit 5  Supervision   Additional items Assist x 1; Increased time required   Additional Comments After OT session pt seated in chair with all needs within reach  Transfers   Sit to Stand 4  Minimal assistance   Additional items Assist x 1; Increased time required;Verbal cues   Stand to Sit 4  Minimal assistance   Additional items Assist x 1; Increased time required;Verbal cues   Stand pivot 4  Minimal assistance   Additional items Assist x 1; Increased time required;Verbal cues   Functional Mobility   Additional Comments Pt reports that she has not been able to hop on R leg and that knee scooter is not currently here  Will assess with knee scooter as able  Balance   Static Sitting Fair +   Dynamic Sitting Fair +   Static Standing Fair   Dynamic Standing Fair -   Activity Tolerance   Activity Tolerance Patient tolerated treatment well   Medical Staff Made Aware PT Christian Blakely   Nurse Made Aware RN confirmed okay to see pt   RUE Assessment   RUE Assessment WFL   LUE Assessment   LUE Assessment WFL   Cognition   Overall Cognitive Status WFL   Arousal/Participation Alert; Cooperative   Attention Within functional limits   Orientation Level Oriented X4   Memory Within functional limits   Following Commands Follows one step commands without difficulty   Comments Pt very pleasant and cooperative to work with therapy   Assessment   Limitation Decreased ADL status; Decreased endurance;Decreased high-level ADLs   Prognosis Good   Assessment Pt is a 62 y o  female admitted to [facility] on 1/22/2020 w/ acute pulmonary embolism s/p pulmonary lysis on 1/23/2020  Comorbidities include a h/o type 2 DM, DVT, pulmonary hypertension, and essential hypertension  Pt with active OT orders and up with assistance  orders  Pt is NWB on LLE with heel touch for transfers only per podiatry  Pt resides alone in a ranch style home with no LOREN  Pt reports that there are 20 steps from the car to the house but that her family drives her up to the door, avoiding all steps  Pt has limited support upon d/c  Pt was I w/  ADLS and IADLS, (+) drove, & required use of knee scooter since L LE injury DME PTA  Currently pt is min A for functional transfers, and min A for ADLS overall  Pt is limited at this time 2*: endurance, activity tolerance, functional mobility, unsupportive home environment and decreased I w/ ADLS/IADLS  The following Occupational Performance Areas to address include: bathing/shower, dressing, functional mobility and clothing management  Based on the aforementioned OT evaluation, functional performance deficits, and assessments, pt has been identified as a high complexity evaluation  From OT standpoint, anticipate d/c home OT  Pt to continue to benefit from acute immediate OT services to address the following goals 3-5x/week to  w/in 7-10 days: Rip Ibanez Goals   Patient Goals To return home    LTG Time Frame 7-10   Long Term Goal #1 See goals below    Plan   Treatment Interventions ADL retraining;Functional transfer training;UE strengthening/ROM; Endurance training;Patient/family training;Equipment evaluation/education; Compensatory technique education;Continued evaluation; Energy conservation; Activityengagement   Goal Expiration Date 20   OT Frequency 3-5x/wk   Recommendation   OT Discharge Recommendation Home OT   Modified [place] Scale   Modified [place] Scale 3     GOALS    1) Pt will increase activity tolerance to G for 30 min txment sessions    2) Pt will complete UB/LB dressing/self care w/ mod I using adaptive device and DME as needed    3) Pt will complete bathing w/ Mod I w/ use of AE and DME as needed    4) Pt will complete toileting w/ mod I w/ G hygiene/thoroughness using DME as needed    5) Pt will improve functional transfers to Mod I on/off all surfaces using DME as needed w/ G balance/safety while adhering to current weight bearing status  6) Pt will participate in simulated IADL management task with DME as needed to increase independence to  w/ G safety and endurance    7) Pt will demonstrate G carryover of pt/caregiver education and training as appropriate      8) Pt will demonstrate 100% carryover of energy conservation techniques t/o functional I/ADL/leisure tasks w/o cues s/p skilled education    9) Pt will engage in ongoing cognitive assessment w/ G participation to assist w/ safe d/c planning/recommendations as needed    *OTR will assess functional mobility with knee scooter as appropriate     JO Renae, OTR/L

## 2020-01-27 PROBLEM — Z98.890 STATUS POST LEFT FOOT SURGERY: Status: ACTIVE | Noted: 2020-01-27

## 2020-01-27 LAB
APTT PPP: 76 SECONDS (ref 23–37)
APTT SCREEN TO CONFIRM RATIO: 1.13 RATIO (ref 0–1.4)
CONFIRM APTT/NORMAL: 52.3 SEC (ref 0–55)
DRVVT IMM 1:2 NP PPP: 43.1 SEC (ref 0–47)
ERYTHROCYTE [DISTWIDTH] IN BLOOD BY AUTOMATED COUNT: 12.9 % (ref 11.6–15.1)
GLUCOSE SERPL-MCNC: 103 MG/DL (ref 65–140)
GLUCOSE SERPL-MCNC: 113 MG/DL (ref 65–140)
GLUCOSE SERPL-MCNC: 116 MG/DL (ref 65–140)
GLUCOSE SERPL-MCNC: 143 MG/DL (ref 65–140)
HCT VFR BLD AUTO: 45.8 % (ref 34.8–46.1)
HGB BLD-MCNC: 14.6 G/DL (ref 11.5–15.4)
INR PPP: 1.13 (ref 0.84–1.19)
LA PPP-IMP: ABNORMAL
MCH RBC QN AUTO: 32.6 PG (ref 26.8–34.3)
MCHC RBC AUTO-ENTMCNC: 31.9 G/DL (ref 31.4–37.4)
MCV RBC AUTO: 102 FL (ref 82–98)
PLATELET # BLD AUTO: 139 THOUSANDS/UL (ref 149–390)
PMV BLD AUTO: 11.5 FL (ref 8.9–12.7)
PROT C AG ACT/NOR PPP IA: 88 % OF NORMAL (ref 60–150)
PROTHROMBIN TIME: 14.1 SECONDS (ref 11.6–14.5)
RBC # BLD AUTO: 4.48 MILLION/UL (ref 3.81–5.12)
SCREEN APTT: 36.6 SEC (ref 0–51.9)
SCREEN DRVVT: 48.4 SEC (ref 0–47)
THROMBIN TIME: 21.2 SEC (ref 0–23)
WBC # BLD AUTO: 9.23 THOUSAND/UL (ref 4.31–10.16)

## 2020-01-27 PROCEDURE — 85730 THROMBOPLASTIN TIME PARTIAL: CPT | Performed by: INTERNAL MEDICINE

## 2020-01-27 PROCEDURE — 99232 SBSQ HOSP IP/OBS MODERATE 35: CPT | Performed by: INTERNAL MEDICINE

## 2020-01-27 PROCEDURE — 82948 REAGENT STRIP/BLOOD GLUCOSE: CPT

## 2020-01-27 PROCEDURE — 85610 PROTHROMBIN TIME: CPT | Performed by: INTERNAL MEDICINE

## 2020-01-27 PROCEDURE — 85027 COMPLETE CBC AUTOMATED: CPT | Performed by: INTERNAL MEDICINE

## 2020-01-27 RX ORDER — WARFARIN SODIUM 5 MG/1
10 TABLET ORAL
Status: COMPLETED | OUTPATIENT
Start: 2020-01-27 | End: 2020-01-27

## 2020-01-27 RX ADMIN — HEPARIN SODIUM AND DEXTROSE 18 UNITS/KG/HR: 10000; 5 INJECTION INTRAVENOUS at 08:20

## 2020-01-27 RX ADMIN — QUETIAPINE FUMARATE 25 MG: 25 TABLET ORAL at 21:39

## 2020-01-27 RX ADMIN — Medication 50 MG: at 08:21

## 2020-01-27 RX ADMIN — Medication 1 TABLET: at 08:13

## 2020-01-27 RX ADMIN — DOCUSATE SODIUM 100 MG: 100 CAPSULE, LIQUID FILLED ORAL at 17:31

## 2020-01-27 RX ADMIN — CALCIUM 1 TABLET: 500 TABLET ORAL at 08:13

## 2020-01-27 RX ADMIN — OXYCODONE HYDROCHLORIDE AND ACETAMINOPHEN 500 MG: 500 TABLET ORAL at 08:13

## 2020-01-27 RX ADMIN — WARFARIN SODIUM 10 MG: 5 TABLET ORAL at 17:31

## 2020-01-27 RX ADMIN — DOCUSATE SODIUM 100 MG: 100 CAPSULE, LIQUID FILLED ORAL at 08:13

## 2020-01-27 RX ADMIN — HEPARIN SODIUM AND DEXTROSE 18 UNITS/KG/HR: 10000; 5 INJECTION INTRAVENOUS at 19:51

## 2020-01-27 NOTE — ASSESSMENT & PLAN NOTE
Secondary to recent foot surgery and immobility  S/p right and left EKOS catheter placement with continuous TPA and heparin infusion  Catheters removed  Pulmonary following  Patient on heparin drip bridging to coumadin  INR not therapeutic  Will give [id number] PO tonight

## 2020-01-27 NOTE — UTILIZATION REVIEW
Continued Stay Review    Date: 1/26/2020 Sunday                          Current Patient Class: INPATIENT  Current Level of Care: MED SURG     HPI:  62year old female EMERGENT INPATIENT TRANSFER FROM [facility] TO  [address] 1/22/2020 2ND EXTENSIVE LLE DVT, SUB-MASSIVE BILATERAL PULMONARY EMBOLI WITH RIGHT HEART STRAIN  TO INT RADIOLOGY FOR CATHETER DIRECTED TPA PULMONARY LYSIS     1/23/2020 In ICU s/p right and left EKOS catheter placement with continuous TPA and heparin infusion        1/24/2020 - tPA infusion, lysis catheters and R IJ sheaths removed  by Interventional Radiology  Hemodynamically stable and placed on weight based IV heparin infusion  Also transferred to general medicine service with telemetry monitoring  CURRENT ASSESSMENT/ PLAN PER TODAY'S INT MED NOTE:  * Acute pulmonary embolism with acute cor pulmonale (HCC)  Assessment & Plan  Secondary to recent foot surgery and immobility  S/p right and left EKOS catheter placement with continuous TPA and heparin infusion  Catheters removed  Pulmonary following  Patient on heparin drip bridging to coumadin  INR not therapeutic  *Will give [id number] PO tonight      Pulmonary hypertension ([address] )  Assessment & Plan  Likely 2/2 PE  Cardiology and pulmonary following      *will need repeat 2d echo before dc      DVT (deep venous thrombosis) ([facility])  Assessment & Plan  On heparin drip bridging to coumadin      Acute respiratory failure with hypoxia [facility])  Assessment & Plan  2/2 PE    Pertinent Labs/Diagnostic Results:   Results from last 7 days   Lab Units 01/26/20  0437 01/25/20  0547 01/24/20  0531 01/24/20  0026  01/21/20  1247   WBC Thousand/uL 9 46 9 01 8 09 8 54   < > 13 45*   HEMOGLOBIN g/dL 14 1 13 6 13 7 13 3   < > 15 7*   HEMATOCRIT % 44 3 43 0 42 7 41 7   < > 48 4*   PLATELETS Thousands/uL 112* 92* 95* 96*   < > 109*   NEUTROS ABS Thousands/µL  --   --   --   --   --  9 81*       Results from last 7 days   Lab Units 20  0547 20  0530 20  0338 20  0337 20  0422 20  1247   SODIUM mmol/L 139 141 140  --  142 144   POTASSIUM mmol/L 3 9 3 8 4 6  --  3 5 4 4   CHLORIDE mmol/L 105 106 104  --  102 103   CO2 mmol/L 30 31 31  --  27 32   ANION GAP mmol/L 4 4 5  --  13 9   BUN mg/dL 11 11 17  --  18 25   CREATININE mg/dL 0 66 0 64 0 82  --  0 89 1 09   EGFR ml/min/1 73sq m 96 97 77  --  70 55   CALCIUM mg/dL 8 7 8 5 8 9  --  8 9 9 3   MAGNESIUM mg/dL 2 3  --  2 4  --   --   --    PHOSPHORUS mg/dL  --   --   --  5 2*  --   --      Results from last 7 days   Lab Units 20  1821   BETA 2 GLYCO 1 IGA GPI IgA units <9   BETA 2 GLYCO 1 IGG GPI IgG units <9   BETA 2 GLYCO 1 IGM GPI IgM units <9     Results from last 7 days   Lab Units 20  2111 20  1656 20  1102 20  0611 20  2114 20  1616 20  1115 20  0638   POC GLUCOSE mg/dl 116 112 106 125 133 120 143* 111     Results from last 7 days   Lab Units 20  1247   D-DIMER QUANTITATIVE ug/ml FEU >10 00*     Results from last 7 days   Lab Units 20  0547 20  0030 20  1823   20  1247   INR    --   --   --   --  1 22*   PTT seconds 78* 72* 71*   < > 27          Vital Signs:   Temp (24hrs), Av 5 °F (36 9 °C), Min:98 1 °F (36 7 °C), Max:99 8 °F (37 7 °C)   Temp:  [98 1 °F (36 7 °C)-99 8 °F (37 7 °C)] 98 4 °F (36 9 °C)  HR:  [79-92] 92  Resp:  [20] 20  BP: (108-139)/(58-75) 130/75  SpO2:  [88 %-96 %] 93 %  Body mass index is 51 29 kg/m²       Input and Output Summary (last 24 hours) 2020 1125:  Gross per 24 hour   Intake 1256 88 ml   Output 2775 ml   Net -1518 12 ml       REGULAR HOUSE DIET    Scheduled Medications:  ascorbic acid 500 mg Oral Daily   calcium carbonate 1 tablet Oral Daily With Breakfast   docusate sodium 100 mg Oral BID   hydrochlorothiazide 50 mg Oral Daily   insulin lispro 2-12 Units Subcutaneous TID AC   multivitamin-minerals 1 tablet Oral Daily   QUEtiapine 25 mg Oral HS   warfarin 10 mg Oral Once (warfarin)   zinc gluconate 50 mg Oral Daily     Continuous IV Infusions:  heparin (porcine) 3-30 Units/kg/hr (Order-Specific) Intravenous Titrated     PRN Meds:  acetaminophen 650 mg Oral Q6H PRN   heparin (porcine) 10,000 Units Intravenous PRN   heparin (porcine) 5,000 Units Intravenous PRN   polyethylene glycol 17 g Oral Daily PRN   sodium chloride 2 spray Each Nare Q1H PRN   traMADol 50 mg Oral Q6H PRN     Discharge Plan:   TO BE DETERMINED  CASE MANAGEMENT FOLLOWING CLOSELY FOR ALL DISCHARGE NEEDS    Network Utilization Review Department  [email or web address]  ATTENTION: Please call with any questions or concerns to [phone] and carefully listen to the prompts so that you are directed to the right person  All voicemails are confidential   Danny Rutherford all requests for admission clinical reviews, approved or denied determinations and any other requests to dedicated fax number below belonging to the campus where the patient is receiving treatment   List of dedicated fax numbers for the Facilities:  [address] DENIALS (Administrative/Medical Necessity) [phone]   [address] (Maternity/NICU/Pediatrics) [phone]   Davon Lang [phone]   [facility] [phone]   Gian Stefan [phone]   Chris Night [phone]   1205 [facility] [address] [facility] [phone]   [facility]  [phone]   [address] [facility] [address]  2401 [facility] 1000 [address] [facility] [phone]

## 2020-01-27 NOTE — ASSESSMENT & PLAN NOTE
Lab Results   Component Value Date    HGBA1C 6 7 (H) 01/21/2020       Recent Labs     01/26/20  1656 01/26/20  2111 01/27/20  0600 01/27/20  1249   POCGLU 112 116 116 143*       Blood Sugar Average: Last 72 hrs:  (P) 953 [id number]   Hold metformin  ISS

## 2020-01-27 NOTE — SOCIAL WORK
Call received from sonRobina [phone] asking for update on discharge plans  Explained and reviewed PT and OT notes and recommendation  Family concern is that the children all live an hour away and are not available to provide daily assist to the patient  CM will discuss discharge needs with the patient  Will ask therapy to see patient with understanding she will be alone and not have assist at home

## 2020-01-27 NOTE — PROGRESS NOTES
Progress Note - Reese Saucedo 1960, 62 y o  female MRN: [id number]    Unit/Bed#: [facility] 504-01 Encounter: [id number]    Primary Care Provider: Lucas Pennington MD   Date and time admitted to hospital: 1/22/2020 10:22 PM        * Acute pulmonary embolism with acute cor pulmonale [facility])  Assessment & Plan  Secondary to recent foot surgery and immobility  S/p right and left EKOS catheter placement with continuous TPA and heparin infusion  Catheters removed  Pulmonary following  Patient on heparin drip bridging to coumadin  INR not therapeutic  Will give [id number] PO tonight  Pulmonary hypertension ([address] )  Assessment & Plan  Likely 2/2 PE  Cardiology and pulmonary following  Will f/u repeat 2d echo  DVT (deep venous thrombosis) (HCC)  Assessment & Plan  On heparin drip bridging to coumadin  Morbid obesity with BMI of 50 0-59 9, adult [facility])  Assessment & Plan  Secondary to excesses calorie intake  Status post left foot surgery  Assessment & Plan  Patient has left boot    Type 2 diabetes mellitus without complication, without long-term current use of insulin [facility])  Assessment & Plan  Lab Results   Component Value Date    HGBA1C 6 7 (H) 01/21/2020       Recent Labs     01/26/20  1656 01/26/20  2111 01/27/20  0600 01/27/20  1249   POCGLU 112 116 116 143*       Blood Sugar Average: Last 72 hrs:  (P) 123 [id number]   Hold metformin  ISS      Pharmacologic: Warfarin (Coumadin)  Mechanical VTE Prophylaxis in Place: Yes    Patient Centered Rounds: I have performed bedside rounds with nursing staff today  Discussions with Specialists or Other Care Team Provider:     Education and Discussions with Family / Patient:     Time Spent for Care: 20 minutes  More than 50% of total time spent on counseling and coordination of care as described above      Current Length of Stay: 5 day(s)    Current Patient Status: Inpatient   Certification Statement: The patient will continue to require additional inpatient hospital stay due to PE    Discharge Plan / Estimated Discharge Date: once SOB improves        Code Status: Level 1 - Full Code      Subjective:   Patient seen and examined at bedside  Patient has no new complaints  Objective:     Vitals:   Temp (24hrs), Av 2 °F (36 8 °C), Min:97 8 °F (36 6 °C), Max:98 8 °F (37 1 °C)    Temp:  [97 8 °F (36 6 °C)-98 8 °F (37 1 °C)] 98 6 °F (37 °C)  HR:  [83-94] 93  Resp:  [18-20] 20  BP: (114-139)/(60-86) 114/74  SpO2:  [90 %-96 %] 92 %  Body mass index is 51 29 kg/m²  Input and Output Summary (last 24 hours): Intake/Output Summary (Last 24 hours) at 2020 1455  Last data filed at 2020 1201  Gross per 24 hour   Intake 1126 26 ml   Output 2750 ml   Net -1623 74 ml       Physical Exam:     Physical Exam   Constitutional: She is oriented to person, place, and time  She appears well-developed and well-nourished  HENT:   Head: Normocephalic and atraumatic  Eyes: Pupils are equal, round, and reactive to light  EOM are normal    Neck: Normal range of motion  Neck supple  No JVD present  No tracheal deviation present  No thyromegaly present  Cardiovascular: Normal rate, regular rhythm and normal heart sounds  Exam reveals no friction rub  No murmur heard  Pulmonary/Chest: Effort normal and breath sounds normal  No stridor  No respiratory distress  She has no wheezes  Abdominal: Soft  Bowel sounds are normal  She exhibits no distension  There is no tenderness  There is no guarding  Musculoskeletal:   Left foot boot   Neurological: She is alert and oriented to person, place, and time  Skin: Skin is warm and dry  Vitals reviewed        Additional Data:     Labs:    Results from last 7 days   Lab Units 20  0525  20  1247   WBC Thousand/uL 9 23   < > 13 45*   HEMOGLOBIN g/dL 14 6   < > 15 7*   HEMATOCRIT % 45 8   < > 48 4*   PLATELETS Thousands/uL 139*   < > 109*   NEUTROS PCT %  --   --  72   LYMPHS PCT %  --   --  17   MONOS PCT % --   --  9   EOS PCT %  --   --  1    < > = values in this interval not displayed  Results from last 7 days   Lab Units 01/25/20  0547   POTASSIUM mmol/L 3 9   CHLORIDE mmol/L 105   CO2 mmol/L 30   BUN mg/dL 11   CREATININE mg/dL 0 66   CALCIUM mg/dL 8 7     Results from last 7 days   Lab Units 01/27/20  0525   INR  1 13         Recent Cultures (last 7 days):           Last 24 Hours Medication List:     Current Facility-Administered Medications:  acetaminophen 650 mg Oral Q6H PRN MILLER BackNP    ascorbic acid 500 mg Oral Daily JULISA Back    calcium carbonate 1 tablet Oral Daily With Breakfast Meredith Santo, CRNP    docusate sodium 100 mg Oral BID Obey Long MD    heparin (porcine) 3-30 Units/kg/hr (Order-Specific) Intravenous Titrated JULISA Back Last Rate: 18 Units/kg/hr (01/27/20 0820)   heparin (porcine) 10,000 Units Intravenous PRN MILLER BackNP    heparin (porcine) 5,000 Units Intravenous PRN JULISA Back    hydrochlorothiazide 50 mg Oral Daily Meredith Santo, CRNP    insulin lispro 2-12 Units Subcutaneous TID AC JULISA Back    multivitamin-minerals 1 tablet Oral Daily Meredith Santo, CRNP    polyethylene glycol 17 g Oral Daily PRN Obey Long MD    QUEtiapine 25 mg Oral HS Meredith Santo, CRNP    sodium chloride 2 spray Each Nare Q1H PRN Obey Long MD    traMADol 50 mg Oral Q6H PRN JULISA Back    warfarin 10 mg Oral Once (warfarin) Obey Long MD    zinc gluconate 50 mg Oral Daily JULISA Back         Today, Patient Was Seen By: Obey Long MD    ** Please Note: Dragon 360 Dictation voice to text software may have been used in the creation of this document   **

## 2020-01-27 NOTE — QUICK NOTE
Progress Note -Interventional Radiology PA  Sienna Resendiz 62 y o  female MRN: [id number]  Unit/Bed#: [facility] 504-01 Encounter: [id number]    Assessment:    59F s/p pulmonary lysis for submassive pulmonary embolism  PA pressures of 73 by ECHO and direct catheter measurement    Substantial clinical improvement, now on heparin bridge to Coumadin  Seen in her room, much more and admitted, on room air  Right internal jugular puncture site with small bruising, no hematoma    Plan:    - continue anticoagulation, Coumadin is an appropriate choice given her weight  - recommend echocardiogram prior to discharge to assess true post treatment baseline PA pressure  - after discussing with the patient, she has limited mobility due to her nonweightbearing status  She had a planned podiatry visit this week, I have discussed with her podiatrist and she may be eligible for some weight-bearing  Recommend podiatry consult for formal discussion with physical therapy  - diet consult for Coumadin teaching    IR will continue to be available to answer any questions or concerns      30 minutes spent on coordination of care, 15 minutes of which was face-to-face with the patient        Patient Active Problem List   Diagnosis    Thickened endometrium    PMB (postmenopausal bleeding)    Morbid obesity with BMI of 50 0-59 9, adult ([address] )    Cervical stenosis (uterine cervix)    Postoperative state    Acute pulmonary embolism with acute cor pulmonale (HCC)    Type 2 diabetes mellitus without complication, without long-term current use of insulin ([address] )    Essential hypertension    Morbid obesity due to excess calories ([address] )    Pulmonary hypertension ([address] )    Acute respiratory failure with hypoxia ([address] )    DVT (deep venous thrombosis) ([address] )    Status post left foot surgery          Subjective:  Frustrated at being in the hospital    Objective:    Vitals:  /75   Pulse 90   Temp 97 6 °F (36 4 °C) (Oral)   Resp 18   Ht 5' 8" (1 727 m)   Wt (!) 153 kg (337 lb 4 9 oz)   SpO2 91%   BMI 51 29 kg/m²   Body mass index is 51 29 kg/m²  Weight (last 2 days)     None          I/Os:    Intake/Output Summary (Last 24 hours) at 1/27/2020 1700  Last data filed at 1/27/2020 1201  Gross per 24 hour   Intake 1126 26 ml   Output 2750 ml   Net -1623 74 ml       Invasive Devices     Peripheral Intravenous Line            Peripheral IV 01/26/20 Distal;Right;Dorsal (posterior) Hand 1 day                Physical Exam:  /75   Pulse 90   Temp 97 6 °F (36 4 °C) (Oral)   Resp 18   Ht 5' 8" (1 727 m)   Wt (!) 153 kg (337 lb 4 9 oz)   SpO2 91%   BMI 51 29 kg/m²     Limited exam as above, massively obese, not on oxygen, right internal jugular puncture site as above              Lab Results and Cultures:   CBC with diff:   Lab Results   Component Value Date    WBC 9 23 01/27/2020    HGB 14 6 01/27/2020    HCT 45 8 01/27/2020     (H) 01/27/2020     (L) 01/27/2020    MCH 32 6 01/27/2020    MCHC 31 9 01/27/2020    RDW 12 9 01/27/2020    MPV 11 5 01/27/2020    NRBC 0 01/21/2020      BMP/CMP:  Lab Results   Component Value Date    K 3 9 01/25/2020     01/25/2020    CO2 30 01/25/2020    BUN 11 01/25/2020    CREATININE 0 66 01/25/2020    CALCIUM 8 7 01/25/2020    EGFR 96 01/25/2020   ,     Coags:   Lab Results   Component Value Date    PTT 76 (H) 01/27/2020    INR 1 13 01/27/2020   ,   Results from last 7 days   Lab Units 01/27/20  0525 01/26/20  0437 01/25/20  0547 01/25/20  0030 01/24/20  1823  01/21/20  1247   PTT seconds 76* 75* 78* 72* 71*   < > 27   INR  1 13 1 12  --   --   --   --  1 22*    < > = values in this interval not displayed          Lipid Panel: No results found for: CHOL  No results found for: HDL  No results found for: HDL  No results found for: LDLCALC  No results found for: TRIG    HgbA1c:   Lab Results   Component Value Date    HGBA1C 6 7 (H) 01/21/2020       VTE Pharmacologic Prophylaxis: Warfarin (Coumadin)      Thank you for allowing me to participate in the care of Sheldon Tomas

## 2020-01-27 NOTE — PROGRESS NOTES
Progress Note - Pulmonary   Francisca Mercado 62 y o  female MRN: [id number]  Unit/Bed#: [facility] 504-01 Encounter: [id number]      1  Acute hypoxic respiratory failure secondary to acute sub massive bilateral PE, pulmonary HTN WHO group IV and possibly III  - CTA chest with large bilateral central PE with calculated RV/LV ratio 1 66   - subsequent echo revealed severe RV dilation and RA dilation with estimated peak PA pressure 73mmHG, consistent with severe pulmonary hypertension  - patient is now day 4 status post IR catheter directed thrombolysis with EKOS  - given BMI, c/w heparin infusion and bridge with coumadin to therapeutic INR  - will need repeat echo   - lower extremity duplex noted as below  - titrate nasal cannula as tolerated, pulmonary toilet, maintain SpO2 >90 %  - this is a provoked PE/DVT due to patient's recent foot surgery and a immobility since   She will need approximately 6 months of anticoagulation with re-evaluation and repeat echo as outpatient     2  Left lower extremity distal femoral, popliteal, posterior tibial, peroneal vein DVT   - an acute DVT of the distal femoral vein, popliteal vein, portions of the posterior tibial, and peroneal veins in the left lower extremity   No DVT in right lower extremity  - continue heparin infusion with coumadin bridge  - provoked in the setting of recent surgery, will need at least 6 months of anticoagulation     3  Suspected obstructive sleep apnea  - patient with significant signs/symptoms of obstructive sleep apnea  - will need outpatient sleep study  - patient should follow up outpatient for this     4  Type 2 diabetes mellitus  - A1c noted 6 7%  - continue insulin sliding scale, diabetic diet     5  Hypertension  -  on thiazide per primary team      Subjective:   Patient seen examined at bedside  States her breathing continues to improve  Currently saturating well on 1 L nasal cannula    Denies significant shortness of breath, cough, congestion, fever, chills, nausea, vomiting, chest pain  She is still not ambulatory, given her recent orthopedic surgery on left foot  Review of Systems   Constitutional: Positive for fatigue  Negative for activity change, appetite change, chills, fever and unexpected weight change  HENT: Negative for congestion, rhinorrhea, sneezing and sore throat  Respiratory: Negative for cough, chest tightness, shortness of breath and wheezing  Cardiovascular: Positive for leg swelling  Negative for chest pain and palpitations  Gastrointestinal: Negative for abdominal pain, constipation, diarrhea, nausea and vomiting  Genitourinary: Negative for dysuria  Musculoskeletal: Negative for arthralgias  Neurological: Negative for dizziness, syncope, light-headedness and numbness  Snoring       Objective:     Vitals:    01/26/20 1900 01/26/20 2300 01/27/20 0300 01/27/20 0701   BP: 139/85 119/68 131/60 117/86   BP Location:    Left arm   Pulse: 84 94 88 83   Resp: 18 18 20 20   Temp: 98 8 °F (37 1 °C) 98 °F (36 7 °C) 97 9 °F (36 6 °C) 97 8 °F (36 6 °C)   TempSrc: Oral Oral  Oral   SpO2: 94% 90% 96% 93%   Weight:       Height:               Intake/Output Summary (Last 24 hours) at 1/27/2020 0369  Last data filed at 1/27/2020 0701  Gross per 24 hour   Intake 419 13 ml   Output 3150 ml   Net -2730 87 ml         Physical Exam   Constitutional: She is oriented to person, place, and time  She appears well-developed and well-nourished  No distress  Morbidly obese   HENT:   Head: Normocephalic and atraumatic  Mouth/Throat: Oropharynx is clear and moist  No oropharyngeal exudate  Large neck circumference  Ecchymosis over right neck from previous IJ catheter   Eyes: EOM are normal  No scleral icterus  Cardiovascular: Normal rate, regular rhythm and normal heart sounds  No murmur heard  Pulmonary/Chest: Effort normal and breath sounds normal  No respiratory distress  She has no wheezes  Abdominal: Soft   Bowel sounds are normal  She exhibits no distension  There is no tenderness  Musculoskeletal: She exhibits edema (Trace bilateral lower extremity edema)  left foot brace   Neurological: She is alert and oriented to person, place, and time  Skin: She is not diaphoretic  Labs: I have personally reviewed pertinent lab results  Results from last 7 days   Lab Units 01/27/20  0525 01/26/20  0437 01/25/20  0547  01/21/20  1247   WBC Thousand/uL 9 23 9 46 9 01   < > 13 45*   HEMOGLOBIN g/dL 14 6 14 1 13 6   < > 15 7*   HEMATOCRIT % 45 8 44 3 43 0   < > 48 4*   PLATELETS Thousands/uL 139* 112* 92*   < > 109*   NEUTROS PCT %  --   --   --   --  72   MONOS PCT %  --   --   --   --  9    < > = values in this interval not displayed  Results from last 7 days   Lab Units 01/25/20  0547 01/24/20  0530 01/23/20  0338   POTASSIUM mmol/L 3 9 3 8 4 6   CHLORIDE mmol/L 105 106 104   CO2 mmol/L 30 31 31   BUN mg/dL 11 11 17   CREATININE mg/dL 0 66 0 64 0 82   CALCIUM mg/dL 8 7 8 5 8 9     Results from last 7 days   Lab Units 01/25/20  0547 01/23/20  0338   MAGNESIUM mg/dL 2 3 2 4     Results from last 7 days   Lab Units 01/23/20  0337   PHOSPHORUS mg/dL 5 2*      Results from last 7 days   Lab Units 01/27/20  0525 01/26/20  0437 01/25/20  0547  01/21/20  1247   INR  1 13 1 12  --   --  1 22*   PTT seconds 76* 75* 78*   < > 27    < > = values in this interval not displayed           0   Lab Value Date/Time    TROPONINI 0 03 01/21/2020 1247     Imaging and other studies: I have personally reviewed pertinent reports    and I have personally reviewed pertinent films in PACS  CTA chest 1/21/20   large bilateral central PE with calculated RV/LV ratio 1 66       Lower extremity duplex 1/22  an acute DVT of the distal femoral vein, popliteal vein, portions of the posterior tibial, and peroneal veins in the left lower extremity   No DVT in right lower extremity    Chest x-ray 01/23/2020  Cardiomegaly, otherwise clear     Pulmonary function testing:  None     EKG, Pathology, and Other Studies: I have personally reviewed pertinent reports     Echo 01/22/2020  EF 79%, grade 1 diastolic dysfunction, markedly dilated right ventricle and dilated right atrium with peak PA pressure 73mmHg, consistent with severe pulmonary hypertension        Kp Hidalgo MD  Pulmonary & Critical Care Fellow, [facility]

## 2020-01-28 ENCOUNTER — APPOINTMENT (INPATIENT)
Dept: NON INVASIVE DIAGNOSTICS | Facility: HOSPITAL | Age: 60
DRG: 175 | End: 2020-01-28
Payer: COMMERCIAL

## 2020-01-28 LAB
APTT PPP: 80 SECONDS (ref 23–37)
ERYTHROCYTE [DISTWIDTH] IN BLOOD BY AUTOMATED COUNT: 13 % (ref 11.6–15.1)
GLUCOSE SERPL-MCNC: 101 MG/DL (ref 65–140)
GLUCOSE SERPL-MCNC: 108 MG/DL (ref 65–140)
GLUCOSE SERPL-MCNC: 112 MG/DL (ref 65–140)
GLUCOSE SERPL-MCNC: 114 MG/DL (ref 65–140)
HCT VFR BLD AUTO: 46.8 % (ref 34.8–46.1)
HGB BLD-MCNC: 14.9 G/DL (ref 11.5–15.4)
INR PPP: 1.22 (ref 0.84–1.19)
MCH RBC QN AUTO: 33 PG (ref 26.8–34.3)
MCHC RBC AUTO-ENTMCNC: 31.8 G/DL (ref 31.4–37.4)
MCV RBC AUTO: 104 FL (ref 82–98)
PLATELET # BLD AUTO: 158 THOUSANDS/UL (ref 149–390)
PMV BLD AUTO: 11.3 FL (ref 8.9–12.7)
PROTHROMBIN TIME: 15 SECONDS (ref 11.6–14.5)
RBC # BLD AUTO: 4.51 MILLION/UL (ref 3.81–5.12)
WBC # BLD AUTO: 9.56 THOUSAND/UL (ref 4.31–10.16)

## 2020-01-28 PROCEDURE — 97530 THERAPEUTIC ACTIVITIES: CPT

## 2020-01-28 PROCEDURE — 97535 SELF CARE MNGMENT TRAINING: CPT

## 2020-01-28 PROCEDURE — 93306 TTE W/DOPPLER COMPLETE: CPT

## 2020-01-28 PROCEDURE — 82948 REAGENT STRIP/BLOOD GLUCOSE: CPT

## 2020-01-28 PROCEDURE — 99232 SBSQ HOSP IP/OBS MODERATE 35: CPT | Performed by: INTERNAL MEDICINE

## 2020-01-28 PROCEDURE — 97116 GAIT TRAINING THERAPY: CPT

## 2020-01-28 PROCEDURE — 93325 DOPPLER ECHO COLOR FLOW MAPG: CPT | Performed by: INTERNAL MEDICINE

## 2020-01-28 PROCEDURE — 85027 COMPLETE CBC AUTOMATED: CPT | Performed by: INTERNAL MEDICINE

## 2020-01-28 PROCEDURE — 93321 DOPPLER ECHO F-UP/LMTD STD: CPT | Performed by: INTERNAL MEDICINE

## 2020-01-28 PROCEDURE — 85730 THROMBOPLASTIN TIME PARTIAL: CPT | Performed by: INTERNAL MEDICINE

## 2020-01-28 PROCEDURE — 85610 PROTHROMBIN TIME: CPT | Performed by: INTERNAL MEDICINE

## 2020-01-28 PROCEDURE — 97112 NEUROMUSCULAR REEDUCATION: CPT

## 2020-01-28 PROCEDURE — 93308 TTE F-UP OR LMTD: CPT | Performed by: INTERNAL MEDICINE

## 2020-01-28 RX ORDER — WARFARIN SODIUM 5 MG/1
10 TABLET ORAL
Status: COMPLETED | OUTPATIENT
Start: 2020-01-28 | End: 2020-01-28

## 2020-01-28 RX ADMIN — HEPARIN SODIUM AND DEXTROSE 18 UNITS/KG/HR: 10000; 5 INJECTION INTRAVENOUS at 18:00

## 2020-01-28 RX ADMIN — QUETIAPINE FUMARATE 25 MG: 25 TABLET ORAL at 21:50

## 2020-01-28 RX ADMIN — OXYCODONE HYDROCHLORIDE AND ACETAMINOPHEN 500 MG: 500 TABLET ORAL at 09:08

## 2020-01-28 RX ADMIN — Medication 1 TABLET: at 09:08

## 2020-01-28 RX ADMIN — WARFARIN SODIUM 10 MG: 5 TABLET ORAL at 17:59

## 2020-01-28 RX ADMIN — Medication 50 MG: at 09:08

## 2020-01-28 RX ADMIN — CALCIUM 1 TABLET: 500 TABLET ORAL at 07:44

## 2020-01-28 RX ADMIN — HEPARIN SODIUM AND DEXTROSE 18 UNITS/KG/HR: 10000; 5 INJECTION INTRAVENOUS at 07:45

## 2020-01-28 RX ADMIN — HYDROCHLOROTHIAZIDE 50 MG: 25 TABLET ORAL at 09:08

## 2020-01-28 RX ADMIN — PERFLUTREN 0.8 ML/MIN: 6.52 INJECTION, SUSPENSION INTRAVENOUS at 11:22

## 2020-01-28 RX ADMIN — DOCUSATE SODIUM 100 MG: 100 CAPSULE, LIQUID FILLED ORAL at 09:08

## 2020-01-28 NOTE — ASSESSMENT & PLAN NOTE
Secondary to recent foot surgery and immobility  S/p right and left EKOS catheter placement with continuous TPA and heparin infusion  Catheters removed  Pulmonary following  Patient on heparin drip bridging to coumadin  INR still not therapeutic, 1 30 today  Will give [id number] PO tonight  Patient to go to rehab once INR therapeutic

## 2020-01-28 NOTE — SOCIAL WORK
Call received from son, Caty Constantino  Informed him that patient is agreeable to rehab and was given list of facilities to review  Patient to give CM her choices tomorrow

## 2020-01-28 NOTE — SOCIAL WORK
Informed by therapy that the patient is recommended for rehab  Met with the patient to discuss discharge needs  A post acute care recommendation was made by your care team for STR  Discussed Freedom of Choice with patient  List of facilities given to patient via in person  patient aware the list is custom filtered for them by zip code location and that [facility] post acute providers are designated  Patient will review the list and inform CM of her choices

## 2020-01-28 NOTE — ASSESSMENT & PLAN NOTE
Lab Results   Component Value Date    HGBA1C 6 7 (H) 01/21/2020       Recent Labs     01/27/20  1646 01/27/20 2058 01/28/20  0659 01/28/20  1112   POCGLU 113 103 114 101       Blood Sugar Average: Last 72 hrs:  (P) 846 [id number]   Hold metformin  ISS

## 2020-01-28 NOTE — PLAN OF CARE
Problem: OCCUPATIONAL THERAPY ADULT  Goal: Performs self-care activities at highest level of function for planned discharge setting  See evaluation for individualized goals  Description  Treatment Interventions: ADL retraining, Functional transfer training, UE strengthening/ROM, Endurance training, Patient/family training, Equipment evaluation/education, Compensatory technique education, Continued evaluation, Energy conservation, Activityengagement          See flowsheet documentation for full assessment, interventions and recommendations  Outcome: Progressing  Note:   Limitation: Decreased ADL status, Decreased endurance, Decreased high-level ADLs  Prognosis: Good  Assessment: Patient participated in Skilled OT session this date with interventions consisting of ADL re training with the use of correct body mechnaics, Energy Conservation techniques, deep breathing technique, safety awareness and fall prevention techniques,  therapeutic activities to: increase activity tolerance, increase dynamic sit/ stand balance during functional activity  and increase OOB/ sitting tolerance   Patient agreeable to OT treatment session, upon arrival patient was found seated OOB to Chair  Pt participated in functional transfers, mobility, and self care tasks  Per podiatry, Pt is now WBAT LLE in CAM walker boot  Pt confirmed that she does not have any social support upon D/C and is afraid to return home as she requires A for self care, transfers, and mobility at this time  Pt stated that she is highly fearful of shower transfers and completing household tasks upon D/C  Pt states she would like to go to rehab for increased strengthening and endurance for increased IND w/ self care tasks before returning home alone  Patient requiring verbal cues for safety, verbal cues for pacing thru activity steps, cognitive assistance to anticipate next step and frequent rest periods   Patient continues to be functioning below baseline level, occupational performance remains limited secondary to factors listed above and increased risk for falls and injury  From OT standpoint, recommendation at time of d/c would be Short Term Rehab  Patient to benefit from continued Occupational Therapy treatment while in the hospital to address deficits as defined above and maximize level of functional independence with ADLs and functional mobility        OT Discharge Recommendation: (S) Short Term Rehab  OT - OK to Discharge: Yes(when medically cleared)

## 2020-01-28 NOTE — CONSULTS
Consult - Podiatry   David Bach 62 y o  female MRN: [id number]  Unit/Bed#: [facility] 504-01 Encounter: [id number]    Assessment/Plan     Assessment:  61 y/o female s/p Left foot lisfranc ORIF (12/16/19) now presented with b/l PE and LLE DVT  - s/p pulmonary lysis for submassive PE (1/23/20)    Plan:  - Left incision site is healed, patient can WBAT in CAM walker and roller walker   - recommend short term rehab to regain strength  - PT/OT on board, eval and treat    - patient is stable from podiatric standpoint  - patient will f/u with Dr Yelena Mireles outpatient    - rest of care per primary service     History of Present Illness     HPI:  David Bach is a 62 y o  female who presents s/p left Lisfranc ORIF on 12/16/19  Patient was hospitalized due to chief complaint of shortness of breath  Patient presented with b/l PE and left DVT  She underwent pulmonary lysis for PE  She is currently WBAT on LLE with cam walker and with help of roller walker  Denies any complaints today  Inpatient consult to Podiatry     Performed by  Eletha Dakin, DPM     Authorized by Jaimie Kenyon MD            Review of Systems   Constitutional: Negative  HENT: Negative  Eyes: Negative  Respiratory: Negative  Cardiovascular: Negative  Gastrointestinal: Negative  Musculoskeletal: Negative    Skin:Negative    Neurological: Negative  Psych: negative         Historical Information   Past Medical History:   Diagnosis Date    Colon polyp     Diabetes mellitus ([address] )     pre    Hypertension     Muscle weakness     elev enzymes    Seasonal allergies      Past Surgical History:   Procedure Laterality Date    APPENDECTOMY      AUGMENTATION BREAST      CERVICAL BIOPSY  W/ LOOP ELECTRODE EXCISION      COLONOSCOPY  11/2019    DILATION AND CURETTAGE OF UTERUS      HYSTEROSCOPY N/A 3/20/2018    Procedure: HYSTEROSCOPY;  Surgeon: Heather Becker MD;  [facility] MA[facility];  Service: Gynecology Oncology    IR PULMONARY LYSIS 1/23/2020    ORIF FOOT FRACTURE Left 12/16/2019    Procedure: ORIF LISFRANC;  Surgeon: Ck Aguirre DPM;  Location: [address] [place] [address] MAIN OR;  Service: Podiatry    [place] DILATION/CURETTAGE,DIAGNOSTIC N/A 3/20/2018    Procedure: DILATATION AND CURETTAGE (D&C),HYSTEROSCOPY;  Surgeon: Luis Alberto Vaz MD;  Location:  MAIN OR;  Service: Gynecology Oncology    TUBAL LIGATION      TUBAL REANASTOMOSIS       Social History   Social History     Substance and Sexual Activity   Alcohol Use Yes    Alcohol/week: 8 0 standard drinks    Types: 7 Glasses of wine, 1 Standard drinks or equivalent per week     Social History     Substance and Sexual Activity   Drug Use No     Social History     Tobacco Use   Smoking Status Former Smoker    Types: Cigarettes   Smokeless Tobacco Never Used   Tobacco Comment    QUIT 2010, socially - not for 2 weeks     Family History:   Family History   Problem Relation Age of Onset    Cancer Maternal Aunt         BLADDER    Colon polyps Family     Glaucoma Father     Peripheral vascular disease Father        Meds/Allergies   Medications Prior to Admission   Medication    ascorbic acid (VITAMIN C) 500 mg tablet    Calcium Carbonate-Vit D-Min (CALCIUM 600+D PLUS MINERALS) 600-400 MG-UNIT TABS    hydrochlorothiazide (HYDRODIURIL) 50 mg tablet    Krill Oil 500 MG CAPS    metFORMIN (GLUCOPHAGE) 500 mg tablet    Multiple Vitamins-Minerals (MULTIVITAMIN ADULT PO)    POTASSIUM PO    zinc gluconate 50 mg tablet     Allergies   Allergen Reactions    Erythromycin Base GI Intolerance    Lisinopril Cough       Objective   First Vitals:   Blood Pressure: 129/69 (01/22/20 2326)  Pulse: 82 (01/22/20 2326)  Temperature: 97 8 °F (36 6 °C) (01/22/20 2259)  Temp Source: Oral (01/22/20 2259)  Respirations: (!) 31 (01/22/20 2326)  Height: 5' 8" (172 7 cm) (01/22/20 2232)  Weight - Scale: (!) 153 kg (337 lb 4 9 oz) (01/23/20 0000)  SpO2: 93 % (01/22/20 2258)    Current Vitals:   Blood Pressure: 131/83 (01/28/20 0700)  Pulse: 88 (01/28/20 0700)  Temperature: 97 9 °F (36 6 °C) (01/28/20 0700)  Temp Source: Oral (01/28/20 0700)  Respirations: 20 (01/28/20 0700)  Height: 5' 8" (172 7 cm) (01/23/20 0000)  Weight - Scale: (!) 153 kg (337 lb 4 9 oz) (01/23/20 0000)  SpO2: 93 % (01/28/20 0700)        /83   Pulse 88   Temp 97 9 °F (36 6 °C) (Oral)   Resp 20   Ht 5' 8" (1 727 m)   Wt (!) 153 kg (337 lb 4 9 oz)   SpO2 93%   BMI 51 29 kg/m²      General Appearance:    Alert, cooperative, no distress   Head:    Normocephalic, without obvious abnormality, atraumatic   Eyes:    PERRL, conjunctiva/corneas clear, EOM's intact        Nose:   Moist mucous membranes   Neck:   Supple, symmetrical, trachea midline   Back:     Symmetric   Lungs:     Respirations unlabored   Heart:    Regular rate and rhythm, S1 and S2 normal, no murmur, rub   or gallop   Abdomen:     Soft, non-tender   Extremities:   MMT 5/5  Intact B/l LE ROM  No calf tenderness b/l  No calf edema noted  Pulses:   Pedal Palpable pulses b/l  Cap refill less than 2 seconds  Skin:   Moderately xerotic skin  No open lesions noted  Incision site on Left foot is healed  Neurologic:   Gross sensation is intact  Protective sensation is intact             Lab Results:   Admission on 01/22/2020   Component Date Value    PTT 01/23/2020 57*    WBC 01/23/2020 11 31*    RBC 01/23/2020 4 34     Hemoglobin 01/23/2020 14 4     Hematocrit 01/23/2020 45 0     MCV 01/23/2020 104*    MCH 01/23/2020 33 2     MCHC 01/23/2020 32 0     RDW 01/23/2020 13 2     Platelets 86/77/3518 122*    MPV 01/23/2020 11 7     Sodium 01/23/2020 140     Potassium 01/23/2020 4 6     Chloride 01/23/2020 104     CO2 01/23/2020 31     ANION GAP 01/23/2020 5     BUN 01/23/2020 17     Creatinine 01/23/2020 0 82     Glucose 01/23/2020 126     Calcium 01/23/2020 8 9     eGFR 01/23/2020 77     Magnesium 01/23/2020 2 4     Phosphorus 01/23/2020 5 2*    POC Glucose 01/23/2020 133     WBC 01/23/2020 8 64     RBC 01/23/2020 4 19     Hemoglobin 01/23/2020 14 0     Hematocrit 01/23/2020 42 9     MCV 01/23/2020 102*    MCH 01/23/2020 33 4     MCHC 01/23/2020 32 6     RDW 01/23/2020 13 1     Platelets 02/91/0663 107*    MPV 01/23/2020 10 9     Fibrinogen 01/23/2020 425     PTT 01/23/2020 63*    POC Glucose 01/23/2020 124     AntiThrombIN III Activity 01/23/2020 76*    Anticardiolipin IgA 01/23/2020 <9     Anticardiolipin IgG 01/23/2020 <9     Anticardiolipin IgM 01/23/2020 30*    PTT Lupus Anticoagulant 01/23/2020 36 6     Dilute Viper Venom Time 01/23/2020 48 4*    DILUTE PROTHROMBIN TIME(* 01/23/2020 52 3     THROMBIN TIME (DRVW) 01/23/2020 21 2     DPT CONFIRM RATIO 01/23/2020 1 13     LUPUS REFLEX INTERPRETAT* 01/23/2020 Comment:     Protein C Activity 01/23/2020 88     Protein S Activity 01/23/2020 116     Protein S Ag, Total 01/23/2020 141     Protein S Ag, Free 01/23/2020 153     Beta-2 Glyco 1 IgG 01/23/2020 <9     Beta-2 Glyco 1 IgA 01/23/2020 <9     Beta-2 Glyco 1 IgM 01/23/2020 <9     WBC 01/23/2020 8  18     RBC 01/23/2020 4 15     Hemoglobin 01/23/2020 13 6     Hematocrit 01/23/2020 42 5     MCV 01/23/2020 102*    MCH 01/23/2020 32 8     MCHC 01/23/2020 32 0     RDW 01/23/2020 13 0     Platelets 62/64/3437 101*    MPV 01/23/2020 10 8     Fibrinogen 01/23/2020 405     PTT 01/23/2020 34     POC Glucose 01/23/2020 102     POC Glucose 01/23/2020 100     WBC 01/24/2020 8 54     RBC 01/24/2020 4 07     Hemoglobin 01/24/2020 13 3     Hematocrit 01/24/2020 41 7     MCV 01/24/2020 103*    MCH 01/24/2020 32 7     MCHC 01/24/2020 31 9     RDW 01/24/2020 12 9     Platelets 64/99/7462 96*    MPV 01/24/2020 10 5     Fibrinogen 01/24/2020 295     PTT 01/24/2020 35     Sodium 01/24/2020 141     Potassium 01/24/2020 3 8     Chloride 01/24/2020 106     CO2 01/24/2020 31     ANION GAP 01/24/2020 4     BUN 01/24/2020 11     Creatinine 01/24/2020 0 64     Glucose 01/24/2020 105     Calcium 01/24/2020 8 5     eGFR 01/24/2020 97     WBC 01/24/2020 8 09     RBC 01/24/2020 4 13     Hemoglobin 01/24/2020 13 7     Hematocrit 01/24/2020 42 7     MCV 01/24/2020 103*    MCH 01/24/2020 33 2     MCHC 01/24/2020 32 1     RDW 01/24/2020 12 9     Platelets 65/66/0969 95*    MPV 01/24/2020 11 3     Fibrinogen 01/24/2020 277     PTT 01/24/2020 38*    POC Glucose 01/24/2020 113     POC Glucose 01/24/2020 120     POC Glucose 01/24/2020 152*    PTT 01/24/2020 71*    POC Glucose 01/24/2020 117     PTT 01/25/2020 72*    Magnesium 01/25/2020 2 3     Sodium 01/25/2020 139     Potassium 01/25/2020 3 9     Chloride 01/25/2020 105     CO2 01/25/2020 30     ANION GAP 01/25/2020 4     BUN 01/25/2020 11     Creatinine 01/25/2020 0 66     Glucose 01/25/2020 121     Calcium 01/25/2020 8 7     eGFR 01/25/2020 96     WBC 01/25/2020 9 01     RBC 01/25/2020 4 19     Hemoglobin 01/25/2020 13 6     Hematocrit 01/25/2020 43 0     MCV 01/25/2020 103*    MCH 01/25/2020 32 5     MCHC 01/25/2020 31 6     RDW 01/25/2020 13 0     Platelets 24/07/6104 92*    MPV 01/25/2020 10 6     PTT 01/25/2020 78*    POC Glucose 01/25/2020 111     POC Glucose 01/25/2020 143*    POC Glucose 01/25/2020 120     POC Glucose 01/25/2020 133     PTT 01/26/2020 75*    WBC 01/26/2020 9 46     RBC 01/26/2020 4 29     Hemoglobin 01/26/2020 14 1     Hematocrit 01/26/2020 44 3     MCV 01/26/2020 103*    MCH 01/26/2020 32 9     MCHC 01/26/2020 31 8     RDW 01/26/2020 12 9     Platelets 03/04/5511 112*    MPV 01/26/2020 11 3     Protime 01/26/2020 14 0     INR 01/26/2020 1 12     POC Glucose 01/26/2020 125     POC Glucose 01/26/2020 106     POC Glucose 01/26/2020 112     POC Glucose 01/26/2020 116     WBC 01/27/2020 9 23     RBC 01/27/2020 4 48     Hemoglobin 01/27/2020 14 6     Hematocrit 01/27/2020 45 8     MCV 01/27/2020 102*    MCH 01/27/2020 32 6     MCHC 01/27/2020 31 9     RDW 01/27/2020 12 9     Platelets 86/21/1554 139*    MPV 01/27/2020 11 5     PTT 01/27/2020 76*    Protime 01/27/2020 14 1     INR 01/27/2020 1 13     POC Glucose 01/27/2020 116     dRVVT Mix Interp  01/23/2020 43 1     POC Glucose 01/27/2020 143*    POC Glucose 01/27/2020 113     POC Glucose 01/27/2020 103     Protime 01/28/2020 15 0*    INR 01/28/2020 1 22*    WBC 01/28/2020 9 56     RBC 01/28/2020 4 51     Hemoglobin 01/28/2020 14 9     Hematocrit 01/28/2020 46 8*    MCV 01/28/2020 104*    MCH 01/28/2020 33 0     MCHC 01/28/2020 31 8     RDW 01/28/2020 13 0     Platelets 19/17/0197 158     MPV 01/28/2020 11 3     PTT 01/28/2020 80*    POC Glucose 01/28/2020 114     POC Glucose 01/28/2020 101                    Invalid input(s): LABAEARO            Imaging: I have personally reviewed pertinent films in PACS  EKG, Pathology, and Other Studies: I have personally reviewed pertinent reports        Code Status: Level 1 - Full Code  Advance Directive and Living Will:      Power of :    POLST:

## 2020-01-28 NOTE — PLAN OF CARE
Problem: Potential for Falls  Goal: Patient will remain free of falls  Description  INTERVENTIONS:  - Assess patient frequently for physical needs  -  Identify cognitive and physical deficits and behaviors that affect risk of falls    -  [place] fall precautions as indicated by assessment   - Educate patient/family on patient safety including physical limitations  - Instruct patient to call for assistance with activity based on assessment  - Modify environment to reduce risk of injury  - Consider OT/PT consult to assist with strengthening/mobility  Outcome: Progressing     Problem: Prexisting or High Potential for Compromised Skin Integrity  Goal: Skin integrity is maintained or improved  Description  INTERVENTIONS:  - Identify patients at risk for skin breakdown  - Assess and monitor skin integrity  - Assess and monitor nutrition and hydration status  - Monitor labs   - Assess for incontinence   - Turn and reposition patient  - Assist with mobility/ambulation  - Relieve pressure over bony prominences  - Avoid friction and shearing  - Provide appropriate hygiene as needed including keeping skin clean and dry  - Evaluate need for skin moisturizer/barrier cream  - Collaborate with interdisciplinary team   - Patient/family teaching  - Consider wound care consult   Outcome: Progressing     Problem: PAIN - ADULT  Goal: Verbalizes/displays adequate comfort level or baseline comfort level  Description  Interventions:  - Encourage patient to monitor pain and request assistance  - Assess pain using appropriate pain scale  - Administer analgesics based on type and severity of pain and evaluate response  - Implement non-pharmacological measures as appropriate and evaluate response  - Consider cultural and social influences on pain and pain management  - Notify physician/advanced practitioner if interventions unsuccessful or patient reports new pain  Outcome: Progressing     Problem: INFECTION - ADULT  Goal: Absence or prevention of progression during hospitalization  Description  INTERVENTIONS:  - Assess and monitor for signs and symptoms of infection  - Monitor lab/diagnostic results  - Monitor all insertion sites, i e  indwelling lines, tubes, and drains  - Monitor endotracheal if appropriate and nasal secretions for changes in amount and color  - [place] appropriate cooling/warming therapies per order  - Administer medications as ordered  - Instruct and encourage patient and family to use good hand hygiene technique  - Identify and instruct in appropriate isolation precautions for identified infection/condition  Outcome: Progressing     Problem: SAFETY ADULT  Goal: Maintain or return to baseline ADL function  Description  INTERVENTIONS:  -  Assess patient's ability to carry out ADLs; assess patient's baseline for ADL function and identify physical deficits which impact ability to perform ADLs (bathing, care of mouth/teeth, toileting, grooming, dressing, etc )  - Assess/evaluate cause of self-care deficits   - Assess range of motion  - Assess patient's mobility; develop plan if impaired  - Assess patient's need for assistive devices and provide as appropriate  - Encourage maximum independence but intervene and supervise when necessary  - Involve family in performance of ADLs  - Assess for home care needs following discharge   - Consider OT consult to assist with ADL evaluation and planning for discharge  - Provide patient education as appropriate  Outcome: Progressing  Goal: Maintain or return mobility status to optimal level  Description  INTERVENTIONS:  - Assess patient's baseline mobility status (ambulation, transfers, stairs, etc )    - Identify cognitive and physical deficits and behaviors that affect mobility  - Identify mobility aids required to assist with transfers and/or ambulation (gait belt, sit-to-stand, lift, walker, cane, etc )  - [place] fall precautions as indicated by assessment  - Record patient progress and toleration of activity level on Mobility SBAR; progress patient to next Phase/Stage  - Instruct patient to call for assistance with activity based on assessment  - Consider rehabilitation consult to assist with strengthening/weightbearing, etc   Outcome: Progressing     Problem: DISCHARGE PLANNING  Goal: Discharge to home or other facility with appropriate resources  Description  INTERVENTIONS:  - Identify barriers to discharge w/patient and caregiver  - Arrange for needed discharge resources and transportation as appropriate  - Identify discharge learning needs (meds, wound care, etc )  - Arrange for interpretive services to assist at discharge as needed  - Refer to Case Management Department for coordinating discharge planning if the patient needs post-hospital services based on physician/advanced practitioner order or complex needs related to functional status, cognitive ability, or social support system  Outcome: Progressing     Problem: Knowledge Deficit  Goal: Patient/family/caregiver demonstrates understanding of disease process, treatment plan, medications, and discharge instructions  Description  Complete learning assessment and assess knowledge base    Interventions:  - Provide teaching at level of understanding  - Provide teaching via preferred learning methods  Outcome: Progressing     Problem: CARDIOVASCULAR - ADULT  Goal: Maintains optimal cardiac output and hemodynamic stability  Description  INTERVENTIONS:  - Monitor I/O, vital signs and rhythm  - Monitor for S/S and trends of decreased cardiac output  - Administer and titrate ordered vasoactive medications to optimize hemodynamic stability  - Assess quality of pulses, skin color and temperature  - Assess for signs of decreased coronary artery perfusion  - Instruct patient to report change in severity of symptoms  Outcome: Progressing  Goal: Absence of cardiac dysrhythmias or at baseline rhythm  Description  INTERVENTIONS:  - Continuous cardiac monitoring, vital signs, obtain 12 lead EKG if ordered  - Administer antiarrhythmic and heart rate control medications as ordered  - Monitor electrolytes and administer replacement therapy as ordered  Outcome: Progressing     Problem: RESPIRATORY - ADULT  Goal: Achieves optimal ventilation and oxygenation  Description  INTERVENTIONS:  - Assess for changes in respiratory status  - Assess for changes in mentation and behavior  - Position to facilitate oxygenation and minimize respiratory effort  - Oxygen administered by appropriate delivery if ordered  - Initiate smoking cessation education as indicated  - Encourage broncho-pulmonary hygiene including cough, deep breathe, Incentive Spirometry  - Assess the need for suctioning and aspirate as needed  - Assess and instruct to report SOB or any respiratory difficulty  - Respiratory Therapy support as indicated  Outcome: Progressing     Problem: Nutrition/Hydration-ADULT  Goal: Nutrient/Hydration intake appropriate for improving, restoring or maintaining nutritional needs  Description  Monitor and assess patient's nutrition/hydration status for malnutrition  Collaborate with interdisciplinary team and initiate plan and interventions as ordered  Monitor patient's weight and dietary intake as ordered or per policy  Utilize nutrition screening tool and intervene as necessary  Determine patient's food preferences and provide high-protein, high-caloric foods as appropriate       INTERVENTIONS:  - Monitor oral intake, urinary output, labs, and treatment plans  - Assess nutrition and hydration status and recommend course of action  - Evaluate amount of meals eaten  - Assist patient with eating if necessary   - Allow adequate time for meals  - Recommend/ encourage appropriate diets, oral nutritional supplements, and vitamin/mineral supplements  - Order, calculate, and assess calorie counts as needed  - Recommend, monitor, and adjust tube feedings and TPN/PPN based on assessed needs  - Assess need for intravenous fluids  - Provide specific nutrition/hydration education as appropriate  - Include patient/family/caregiver in decisions related to nutrition  Outcome: Progressing     Problem: GENITOURINARY - ADULT  Goal: Maintains or returns to baseline urinary function  Description  INTERVENTIONS:  - Assess urinary function  - Encourage oral fluids to ensure adequate hydration if ordered  - Administer IV fluids as ordered to ensure adequate hydration  - Administer ordered medications as needed  - Offer frequent toileting  - Follow urinary retention protocol if ordered  Outcome: Progressing

## 2020-01-28 NOTE — PROGRESS NOTES
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  - CTA chest with large bilateral central PE with calculated RV/LV ratio 1 66   - subsequent echo revealed severe RV dilation and RA dilation with estimated peak PA pressure 73mmHG, consistent with severe pulmonary hypertension  - patient is now day 5 status post IR catheter directed thrombolysis with EKOS  - given BMI, c/w heparin infusion and bridge with coumadin to therapeutic INR  - will need repeat echo in roughly 3 months with pulmonary follow-up outpatient  - lower extremity duplex noted as below  - titrate nasal cannula as tolerated, pulmonary toilet, maintain SpO2 >90 %  - this is a provoked PE/DVT due to patient's recent foot surgery and a immobility since   She will need approximately 6 months of anticoagulation with re-evaluation and repeat echo as outpatient     2  Left lower extremity distal femoral, popliteal, posterior tibial, peroneal vein DVT   - an acute DVT of the distal femoral vein, popliteal vein, portions of the posterior tibial, and peroneal veins in the left lower extremity   No DVT in right lower extremity  - continue heparin infusion with coumadin bridge  - provoked in the setting of recent surgery, will need at least 6 months of anticoagulation     3  Suspected obstructive sleep apnea  - patient with significant signs/symptoms of obstructive sleep apnea  - will need outpatient sleep study  - patient should follow up outpatient for this     4  Type 2 diabetes mellitus  - A1c noted 6 7%  - continue insulin sliding scale, diabetic diet     5  Hypertension  -  on thiazide per primary team    *Pt stable from pulmonary standpoint  She should continue bridge with coumadin and heparin   She will follow up with Pulmonary as outpatient and will need repeat echocardiogram in 3 months  Pulmonary will sign off at this time, please call with questions  Subjective:   Patient seen examined at bedside  She is sitting up in bed, using incentive spirometry, saturating well on room air  She states that her breathing is markedly improving  Denies fever, chills, nausea, chest pain, cough  Remains on IV heparin infusion and received Coumadin yesterday evening  Review of Systems   Constitutional: Positive for fatigue  Negative for activity change, appetite change, chills, fever and unexpected weight change  HENT: Negative for congestion, rhinorrhea, sneezing and sore throat  Respiratory: Negative for cough, chest tightness, shortness of breath and wheezing  Cardiovascular: Positive for leg swelling  Negative for chest pain and palpitations  Gastrointestinal: Negative for abdominal pain, constipation, diarrhea, nausea and vomiting  Genitourinary: Negative for dysuria  Musculoskeletal: Negative for arthralgias  Neurological: Negative for dizziness, syncope, light-headedness and numbness  Snoring       Objective:     Vitals:    01/27/20 1500 01/27/20 1900 01/27/20 2335 01/28/20 0700   BP: 135/75 135/84 134/72 131/83   BP Location:   Left arm    Pulse: 90 93 86 88   Resp: 18 18 20 20   Temp: 97 6 °F (36 4 °C) 98 5 °F (36 9 °C) 98 3 °F (36 8 °C) 97 9 °F (36 6 °C)   TempSrc: Oral Oral Oral Oral   SpO2: 91% 92% 90% 93%   Weight:       Height:               Intake/Output Summary (Last 24 hours) at 1/28/2020 1887  Last data filed at 1/28/2020 0900  Gross per 24 hour   Intake 836 52 ml   Output 900 ml   Net -63 48 ml         Physical Exam   Constitutional: She is oriented to person, place, and time  She appears well-developed and well-nourished  No distress  Morbidly obese   HENT:   Head: Normocephalic and atraumatic  Mouth/Throat: Oropharynx is clear and moist  No oropharyngeal exudate     Ecchymosis on right neck over previous IJ insertion site  Large neck circumference   Eyes: EOM are normal  No scleral icterus  Cardiovascular: Normal rate, regular rhythm and normal heart sounds  No murmur heard  Pulmonary/Chest: Effort normal  No respiratory distress  She has no wheezes  Decreased breath sounds likely secondary to body habitus   Abdominal: Soft  Bowel sounds are normal  She exhibits no distension  There is no tenderness  Musculoskeletal: She exhibits edema (Trace bilateral lower extremity edema)  Left foot in brace   Neurological: She is alert and oriented to person, place, and time  Skin: She is not diaphoretic  Labs: I have personally reviewed pertinent lab results  Results from last 7 days   Lab Units 01/28/20  0512 01/27/20  0525 01/26/20  0437  01/21/20  1247   WBC Thousand/uL 9 56 9 23 9 46   < > 13 45*   HEMOGLOBIN g/dL 14 9 14 6 14 1   < > 15 7*   HEMATOCRIT % 46 8* 45 8 44 3   < > 48 4*   PLATELETS Thousands/uL 158 139* 112*   < > 109*   NEUTROS PCT %  --   --   --   --  72   MONOS PCT %  --   --   --   --  9    < > = values in this interval not displayed        Results from last 7 days   Lab Units 01/25/20  0547 01/24/20  0530 01/23/20  0338   POTASSIUM mmol/L 3 9 3 8 4 6   CHLORIDE mmol/L 105 106 104   CO2 mmol/L 30 31 31   BUN mg/dL 11 11 17   CREATININE mg/dL 0 66 0 64 0 82   CALCIUM mg/dL 8 7 8 5 8 9     Results from last 7 days   Lab Units 01/25/20  0547 01/23/20  0338   MAGNESIUM mg/dL 2 3 2 4     Results from last 7 days   Lab Units 01/23/20  0337   PHOSPHORUS mg/dL 5 2*      Results from last 7 days   Lab Units 01/28/20  0512 01/27/20  0525 01/26/20  0437   INR  1 22* 1 13 1 12   PTT seconds 80* 76* 75*         0   Lab Value Date/Time    TROPONINI 0 03 01/21/2020 1247       Imaging and other studies: I have personally reviewed pertinent reports    and I have personally reviewed pertinent films in PACS  CTA chest 1/21/20   large bilateral central PE with calculated RV/LV ratio 1 66       Lower extremity duplex 1/22  an acute DVT of the distal femoral vein, popliteal vein, portions of the posterior tibial, and peroneal veins in the left lower extremity   No DVT in right lower extremity     Chest x-ray 01/23/2020  Cardiomegaly, otherwise clear     Pulmonary function testing:  None     EKG, Pathology, and Other Studies: I have personally reviewed pertinent reports     Echo 01/22/2020  EF 30%, grade 1 diastolic dysfunction, markedly dilated right ventricle and dilated right atrium with peak PA pressure 73mmHg, consistent with severe pulmonary hypertension        Coby Morejon MD  Pulmonary & Critical Care Fellow, [facility]

## 2020-01-28 NOTE — OCCUPATIONAL THERAPY NOTE
Occupational Therapy Treatment Note      David Bach    1/28/2020    Principal Problem:    Acute pulmonary embolism with acute cor pulmonale (HCC)  Active Problems: Morbid obesity with BMI of 50 0-59 9, adult ([facility] )    Type 2 diabetes mellitus without complication, without long-term current use of insulin ([facility] )    Pulmonary hypertension ([facility] )    Acute respiratory failure with hypoxia ([facility] )    DVT (deep venous thrombosis) ([facility] )    Status post left foot surgery      Past Medical History:   Diagnosis Date    Colon polyp     Diabetes mellitus ([facility] )     pre    Hypertension     Muscle weakness     elev enzymes    Seasonal allergies        Past Surgical History:   Procedure Laterality Date    APPENDECTOMY      AUGMENTATION BREAST      CERVICAL BIOPSY  W/ LOOP ELECTRODE EXCISION      COLONOSCOPY  11/2019    DILATION AND CURETTAGE OF UTERUS      HYSTEROSCOPY N/A 3/20/2018    Procedure: HYSTEROSCOPY;  Surgeon: Heather Becker MD;  Location:  MAIN OR;  Service: Gynecology Oncology    IR PULMONARY LYSIS  1/23/2020    ORIF FOOT FRACTURE Left 12/16/2019    Procedure: ORIF LISFRANC;  Surgeon: Misty Forman DPM;  Location: [address] [place] [address] MAIN OR;  Service: Podiatry    AK DILATION/CURETTAGE,DIAGNOSTIC N/A 3/20/2018    Procedure: DILATATION AND CURETTAGE (D&C),HYSTEROSCOPY;  Surgeon: Heather Becker MD;  Location:  MAIN OR;  Service: Gynecology Oncology    TUBAL LIGATION      TUBAL REANASTOMOSIS          01/28/20 1530   Restrictions/Precautions   Weight Bearing Precautions Per Order Yes   LLE Weight Bearing Per Order WBAT  (per chart order)   Braces or Orthoses CAM Boot  (LLE)   Other Precautions WBS; Fall Risk;Pain;Hard of hearing   Lifestyle   Autonomy Pt reports being I with ADLS, IADLS and uses knee scooter for mobility PTA  Prior to L LE injury pt mobilized without device and drove      Reciprocal Relationships Pt lives alone and reports that her children live in [facility] and are not able to assist     Service to Others Works full time from home as a     Intr[employer] Playing [employer]   Pain Assessment   Pain Assessment 0-10   Pain Score 3   Pain Type Acute pain   Pain Location Foot   Pain Orientation Left   Pain Descriptors Aching;Discomfort   Pain Frequency Intermittent  (during weight bearing)   Clinical Progression Gradually improving   Effect of Pain on Daily Activities Limits functional mobility during functional tasks  Patient's Stated Pain Goal No pain   Hospital Pain Intervention(s) Repositioned; Ambulation/increased activity; Distraction; Emotional support   Response to Interventions Tolerated   ADL   Grooming Assistance 5  Supervision/Setup   Grooming Deficit Setup;Verbal cueing;Supervision/safety; Increased time to complete   UB Bathing Assistance 4  Minimal Assistance   UB Bathing Deficit Setup;Verbal cueing;Supervision/safety; Increased time to complete   UB Bathing Comments Pt required A to wash her back  LB Bathing Assistance 3  Moderate Assistance   LB Bathing Deficit Setup;Verbal cueing;Supervision/safety; Increased time to complete; Buttocks; Left lower leg including foot   LB Bathing Comments Pt required A to bathe LLE while seated in chair and for buttock hygiene while in stance w/ B/L UE support of RW     UB Dressing Assistance 4  Minimal Assistance   UB Dressing Deficit Setup;Verbal cueing;Supervision/safety; Increased time to complete;Pull around back; Fasteners   UB Dressing Comments [place]/donning gown   LB Dressing Assistance 3  Moderate Assistance   LB Dressing Deficit Setup;Verbal cueing;Supervision/safety; Increased time to complete; Don/doff R sock   Toileting Comments Denies need at this time   Bed Mobility   Supine to Sit Unable to assess   Sit to Supine Unable to assess   Additional Comments Pt seated OOB in chair upon OT arrival  Pt seated OOB in chair w/ all needs in reach s/p OT session  Transfers   Sit to Stand 4  Minimal assistance   Additional items Assist x 1; Increased time required;Verbal cues;Armrests   Stand to Sit 4  Minimal assistance   Additional items Assist x 1; Increased time required;Verbal cues;Armrests   Additional Comments Transfers w/ Daniel Bello  VC and TC required for safe hand placement and safety awareness  Functional Mobility   Functional Mobility 4  Minimal assistance   Additional Comments Pt demonstrated long distance household mobility in hallway w/ RW w/ Min A  Pt required x1 seated rest break 2' SOB, fatigue, and LLE pain with weight bearing  Pt required A of 2nd for close chair follow  Additional items Rolling walker  (Rahat RW)   Cognition   Overall Cognitive Status [facility]   Arousal/Participation Alert; Cooperative   Attention Within functional limits   Orientation Level Oriented X4   Memory Decreased recall of precautions   Following Commands Follows one step commands with increased time or repetition   Comments Pt highly pleasant, cooperative, and motivated to participate in therapy this day  Pt required VC for safety awareness, slow of pace t/o session  Activity Tolerance   Activity Tolerance Patient limited by fatigue;Patient limited by pain;Treatment limited secondary to medical complications (Comment)  (SOB/MOORE)   Medical Staff Made Aware RN cleared Pt for OT session; CM for safe dispo planning   Assessment   Assessment Patient participated in Skilled OT session this date with interventions consisting of ADL re training with the use of correct body mechnaics, Energy Conservation techniques, deep breathing technique, safety awareness and fall prevention techniques,  therapeutic activities to: increase activity tolerance, increase dynamic sit/ stand balance during functional activity  and increase OOB/ sitting tolerance   Patient agreeable to OT treatment session, upon arrival patient was found seated OOB to Chair  Pt participated in functional transfers, mobility, and self care tasks  Per podiatry, Pt is now WBAT LLE in CAM walker boot   Pt confirmed that she does not have any social support upon D/C and is afraid to return home as she requires A for self care, transfers, and mobility at this time  Pt stated that she is highly fearful of shower transfers and completing household tasks upon D/C  Pt states she would like to go to rehab for increased strengthening and endurance for increased IND w/ self care tasks before returning home alone  Patient requiring verbal cues for safety, verbal cues for pacing thru activity steps, cognitive assistance to anticipate next step and frequent rest periods  Patient continues to be functioning below baseline level, occupational performance remains limited secondary to factors listed above and increased risk for falls and injury  From OT standpoint, recommendation at time of d/c would be Short Term Rehab  Patient to benefit from continued Occupational Therapy treatment while in the hospital to address deficits as defined above and maximize level of functional independence with ADLs and functional mobility  Plan   Treatment Interventions ADL retraining;Functional transfer training;UE strengthening/ROM; Endurance training;Patient/family training;Equipment evaluation/education; Compensatory technique education; Activityengagement; Energy conservation   Goal Expiration Date 02/05/20   OT Treatment Day 1   OT Frequency 3-5x/wk   Recommendation   OT Discharge Recommendation Short Term Rehab   OT - OK to Discharge Yes  (when medically cleared)   Modified [place] Scale   Modified [place] Scale 4         Addison Parrish MS, OTR/L

## 2020-01-28 NOTE — PLAN OF CARE
Problem: PHYSICAL THERAPY ADULT  Goal: Performs mobility at highest level of function for planned discharge setting  See evaluation for individualized goals  Description  Treatment/Interventions: Functional transfer training, LE strengthening/ROM, Therapeutic exercise, Endurance training, Patient/family training, Equipment eval/education, Bed mobility, Gait training, Spoke to nursing  Equipment Recommended: Carmel Lewis       See flowsheet documentation for full assessment, interventions and recommendations  Outcome: Progressing  Note:   Prognosis: Good  Problem List: Decreased strength, Decreased endurance, Impaired balance, Decreased mobility, Orthopedic restrictions  Assessment: Set up and provided the pt  with a rollabout to trial  She was only able to ambulate a short distance with this due to discomfort in her posterior knee in her left leg  She required several rests, and after 50 feet she was unable to ambulate any more with the rollabout  She was able to demonstrate transfers to and from the rollabout with only contact guard assistance for safety  She was able to maintain static standing without assistance as well  She does remain limited from her baseline independence, and she will benefit from continued physical therapy  After the session was complete the pt  was upgraded to WBAT to the LLE with the CAM boot by podiatry  Will have a physical therapist assess her for gait at the next session  Barriers to Discharge: Decreased caregiver support     Recommendation: Post acute IP rehab     PT - OK to Discharge: Yes(once medically cleared)    See flowsheet documentation for full assessment

## 2020-01-28 NOTE — PHYSICAL THERAPY NOTE
Physical Therapy Progress Note     01/28/20 1415   Pain Assessment   Pain Assessment 0-10   Pain Score 4   Pain Type Acute pain   Pain Location Knee   Pain Orientation Posterior; Left   Hospital Pain Intervention(s) Repositioned; Emotional support   Response to Interventions Tolerated  Restrictions/Precautions   LLE Weight Bearing Per Order NWB   Braces or Orthoses CAM Boot   Other Precautions WBS; Fall Risk;Pain   Subjective   Subjective The pt  states that she does not feel comfortable returning home due to her continued weakness and limited endurance  Transfers   Sit to Stand 4  Minimal assistance   Additional items Assist x 1; Increased time required   Stand to Sit 4  Minimal assistance   Additional items Assist x 1; Increased time required   Ambulation/Elevation   Gait pattern Excessively slow; Inconsistent rhoda   Gait Assistance 5  Supervision   Additional items Verbal cues   Assistive Device Rollabout   Distance 20 feet, 15 feet x 2  Balance   Static Sitting Good   Dynamic Sitting Fair +   Static Standing Fair   Dynamic Standing Fair -   Ambulatory Fair -   Activity Tolerance   Activity Tolerance Patient tolerated treatment well;Patient limited by fatigue   Medical Staff Made Aware Selene Brown  PT DPT re: change in WBS and discharge disposition  Assessment   Prognosis Good   Problem List Decreased strength;Decreased endurance; Impaired balance;Decreased mobility;Orthopedic restrictions   Assessment Set up and provided the pt  with a rollabout to trial  She was only able to ambulate a short distance with this due to discomfort in her posterior knee in her left leg  She required several rests, and after 50 feet she was unable to ambulate any more with the rollabout  She was able to demonstrate transfers to and from the rollabout with only contact guard assistance for safety  She was able to maintain static standing without assistance as well   She does remain limited from her baseline independence, and she will benefit from continued physical therapy  After the session was complete the pt  was upgraded to WBAT to the LLE with the CAM boot by podiatry  Will have a physical therapist assess her for gait at the next session  Barriers to Discharge Decreased caregiver support   Goals   Patient Goals To go to rehab in order to regain her strength  STG Expiration Date 02/09/20   PT Treatment Day 1   Plan   Treatment/Interventions Functional transfer training;LE strengthening/ROM; Therapeutic exercise; Endurance training;Patient/family training;Bed mobility;Gait training   Progress Progressing toward goals   PT Frequency   (3-5x a week )   Recommendation   Recommendation Post acute IP rehab   Equipment Recommended Andrzej Wesley, PTA

## 2020-01-29 LAB
APTT PPP: 63 SECONDS (ref 23–37)
APTT PPP: 81 SECONDS (ref 23–37)
APTT PPP: 97 SECONDS (ref 23–37)
ERYTHROCYTE [DISTWIDTH] IN BLOOD BY AUTOMATED COUNT: 13 % (ref 11.6–15.1)
GLUCOSE SERPL-MCNC: 104 MG/DL (ref 65–140)
GLUCOSE SERPL-MCNC: 108 MG/DL (ref 65–140)
GLUCOSE SERPL-MCNC: 144 MG/DL (ref 65–140)
GLUCOSE SERPL-MCNC: 172 MG/DL (ref 65–140)
HCT VFR BLD AUTO: 46.2 % (ref 34.8–46.1)
HGB BLD-MCNC: 14.7 G/DL (ref 11.5–15.4)
INR PPP: 1.3 (ref 0.84–1.19)
MCH RBC QN AUTO: 32.6 PG (ref 26.8–34.3)
MCHC RBC AUTO-ENTMCNC: 31.8 G/DL (ref 31.4–37.4)
MCV RBC AUTO: 102 FL (ref 82–98)
PLATELET # BLD AUTO: 170 THOUSANDS/UL (ref 149–390)
PMV BLD AUTO: 10.1 FL (ref 8.9–12.7)
PROTHROMBIN TIME: 15.8 SECONDS (ref 11.6–14.5)
RBC # BLD AUTO: 4.51 MILLION/UL (ref 3.81–5.12)
WBC # BLD AUTO: 9.54 THOUSAND/UL (ref 4.31–10.16)

## 2020-01-29 PROCEDURE — 99232 SBSQ HOSP IP/OBS MODERATE 35: CPT | Performed by: INTERNAL MEDICINE

## 2020-01-29 PROCEDURE — 82948 REAGENT STRIP/BLOOD GLUCOSE: CPT

## 2020-01-29 PROCEDURE — 97116 GAIT TRAINING THERAPY: CPT

## 2020-01-29 PROCEDURE — 85027 COMPLETE CBC AUTOMATED: CPT | Performed by: INTERNAL MEDICINE

## 2020-01-29 PROCEDURE — 85610 PROTHROMBIN TIME: CPT | Performed by: INTERNAL MEDICINE

## 2020-01-29 PROCEDURE — 97530 THERAPEUTIC ACTIVITIES: CPT

## 2020-01-29 PROCEDURE — 85730 THROMBOPLASTIN TIME PARTIAL: CPT | Performed by: INTERNAL MEDICINE

## 2020-01-29 RX ORDER — WARFARIN SODIUM 5 MG/1
10 TABLET ORAL
Status: COMPLETED | OUTPATIENT
Start: 2020-01-29 | End: 2020-01-29

## 2020-01-29 RX ADMIN — DOCUSATE SODIUM 100 MG: 100 CAPSULE, LIQUID FILLED ORAL at 08:22

## 2020-01-29 RX ADMIN — QUETIAPINE FUMARATE 25 MG: 25 TABLET ORAL at 21:17

## 2020-01-29 RX ADMIN — DOCUSATE SODIUM 100 MG: 100 CAPSULE, LIQUID FILLED ORAL at 18:13

## 2020-01-29 RX ADMIN — HEPARIN SODIUM AND DEXTROSE 18 UNITS/KG/HR: 10000; 5 INJECTION INTRAVENOUS at 06:23

## 2020-01-29 RX ADMIN — OXYCODONE HYDROCHLORIDE AND ACETAMINOPHEN 500 MG: 500 TABLET ORAL at 08:22

## 2020-01-29 RX ADMIN — Medication 1 TABLET: at 08:23

## 2020-01-29 RX ADMIN — WARFARIN SODIUM 10 MG: 5 TABLET ORAL at 18:12

## 2020-01-29 RX ADMIN — CALCIUM 1 TABLET: 500 TABLET ORAL at 08:22

## 2020-01-29 RX ADMIN — HEPARIN SODIUM AND DEXTROSE 16 UNITS/KG/HR: 10000; 5 INJECTION INTRAVENOUS at 19:15

## 2020-01-29 RX ADMIN — Medication 50 MG: at 08:23

## 2020-01-29 NOTE — SOCIAL WORK
VANGIE received call from [address] [place] [address] ([phone]) with [facility] stating that she will be making an on site visit to the hospital and will be meeting with pt to discuss the potential for a bed at [facility] CM has not received a response from [facility] at this time  CM to follow

## 2020-01-29 NOTE — SOCIAL WORK
CM received call form pt with rehab choices 1  [facility] in [facility] 2   [facility]

## 2020-01-29 NOTE — PLAN OF CARE
Problem: PHYSICAL THERAPY ADULT  Goal: Performs mobility at highest level of function for planned discharge setting  See evaluation for individualized goals  Description  Treatment/Interventions: Functional transfer training, LE strengthening/ROM, Therapeutic exercise, Endurance training, Patient/family training, Equipment eval/education, Bed mobility, Gait training, Spoke to nursing  Equipment Recommended: See Villegas       See flowsheet documentation for full assessment, interventions and recommendations  Outcome: Progressing  Note:   Prognosis: Good  Problem List: Decreased strength, Decreased endurance, Impaired balance, Decreased mobility, Pain, Orthopedic restrictions  Assessment: Pt presents with decreased mobility, strength, balance, and activity tolerance  Pt demonstrated ability to perform STS functional transfers at [address]  Pt ambulated 100 ft X2 with RW at supervision  Pt w/ updated orders for WBAT on LLE w/ CAM boot  Pt presents w/ improved gait this session being able to ambulate further distances w/ less assistance  Pt continues to benefit from skilled therapy to maximize functional independence  Recommendation at this time is rehab  Pt would benefit from continued ambulation, functional transfers, strength, balance, and activity tolerance  Goals updated to reflect pt's current functional mobility status  Barriers to Discharge: Decreased caregiver support     Recommendation: Post acute IP rehab     PT - OK to Discharge: Yes    See flowsheet documentation for full assessment

## 2020-01-29 NOTE — PROGRESS NOTES
Progress Note - Kareem Ruiz 1960, 62 y o  female MRN: [id number]    Unit/Bed#: [facility] 504-01 Encounter: [id number]    Primary Care Provider: Julius Aponte MD   Date and time admitted to hospital: 1/22/2020 10:22 PM        * Acute pulmonary embolism with acute cor pulmonale [facility])  Assessment & Plan  Secondary to recent foot surgery and immobility  S/p right and left EKOS catheter placement with continuous TPA and heparin infusion  Catheters removed  Pulmonary following  Patient on heparin drip bridging to coumadin  INR still not therapeutic, 1 30 today  Will give [id number] PO tonight  Patient to go to rehab once INR therapeutic  Pulmonary hypertension ([address] )  Assessment & Plan  Likely 2/2 PE  Cardiology and pulmonary following  Repeat echo EF65%  DVT (deep venous thrombosis) (HCC)  Assessment & Plan  On heparin drip bridging to coumadin  Acute respiratory failure with hypoxia (HCC)  Assessment & Plan  2/2 PE    Morbid obesity with BMI of 50 0-59 9, adult [facility])  Assessment & Plan  Secondary to excesses calorie intake  Status post left foot surgery  Assessment & Plan  Patient has left boot  Podiatry evaluated and patient for rehab placement once INR therapeutic  Type 2 diabetes mellitus without complication, without long-term current use of insulin [facility])  Assessment & Plan  Lab Results   Component Value Date    HGBA1C 6 7 (H) 01/21/2020       Recent Labs     01/27/20  1646 01/27/20  2058 01/28/20  0659 01/28/20  1112   POCGLU 113 103 114 101       Blood Sugar Average: Last 72 hrs:  (P) 118 [id number]   Hold metformin  ISS        Pharmacologic: Heparin Drip  Mechanical VTE Prophylaxis in Place: Yes    Patient Centered Rounds: I have performed bedside rounds with nursing staff today  Discussions with Specialists or Other Care Team Provider:     Education and Discussions with Family / Patient:     Time Spent for Care: 20 minutes    More than 50% of total time spent on counseling and coordination of care as described above  Current Length of Stay: 7 day(s)    Current Patient Status: Inpatient   Certification Statement: The patient will continue to require additional inpatient hospital stay due to suptherapeutic INR    Discharge Plan / Estimated Discharge Date: rehab once INR therapeutic  Code Status: Level 1 - Full Code      Subjective:   Patient seen and examined at bedside  Patient has no new complaints  Objective:     Vitals:   Temp (24hrs), Av 4 °F (36 9 °C), Min:98 3 °F (36 8 °C), Max:98 6 °F (37 °C)    Temp:  [98 3 °F (36 8 °C)-98 6 °F (37 °C)] 98 6 °F (37 °C)  HR:  [80-89] 80  Resp:  [18-20] 18  BP: (118-135)/(64-81) 118/64  SpO2:  [92 %-98 %] 98 %  Body mass index is 51 29 kg/m²  Input and Output Summary (last 24 hours): Intake/Output Summary (Last 24 hours) at 2020 1428  Last data filed at 2020 0801  Gross per 24 hour   Intake 1603 38 ml   Output 3000 ml   Net -1396 62 ml       Physical Exam:     Physical Exam   Constitutional: She is oriented to person, place, and time  She appears well-developed and well-nourished  HENT:   Head: Normocephalic and atraumatic  Eyes: Pupils are equal, round, and reactive to light  EOM are normal    Neck: Normal range of motion  Neck supple  No JVD present  No tracheal deviation present  No thyromegaly present  Cardiovascular: Normal rate, regular rhythm and normal heart sounds  Exam reveals no gallop and no friction rub  No murmur heard  Pulmonary/Chest: Effort normal and breath sounds normal  No stridor  No respiratory distress  She has no wheezes  Abdominal: Soft  Bowel sounds are normal  She exhibits no distension  There is no tenderness  There is no guarding  Musculoskeletal: Normal range of motion  She exhibits no edema  Left boot   Neurological: She is alert and oriented to person, place, and time  Skin: Skin is warm and dry  Vitals reviewed        Additional Data: Labs:    Results from last 7 days   Lab Units 01/29/20  0501   WBC Thousand/uL 9 54   HEMOGLOBIN g/dL 14 7   HEMATOCRIT % 46 2*   PLATELETS Thousands/uL 170     Results from last 7 days   Lab Units 01/25/20  0547   POTASSIUM mmol/L 3 9   CHLORIDE mmol/L 105   CO2 mmol/L 30   BUN mg/dL 11   CREATININE mg/dL 0 66   CALCIUM mg/dL 8 7     Results from last 7 days   Lab Units 01/29/20  0501   INR  1 30*         Recent Cultures (last 7 days):           Last 24 Hours Medication List:     Current Facility-Administered Medications:  acetaminophen 650 mg Oral Q6H PRN MILLER RodriguezNP    ascorbic acid 500 mg Oral Daily JULISA Rodriguez    calcium carbonate 1 tablet Oral Daily With Breakfast Meredith Santo, CRNP    docusate sodium 100 mg Oral BID Linda Silva MD    heparin (porcine) 3-30 Units/kg/hr (Order-Specific) Intravenous Titrated JULISA Rodriguez Last Rate: 16 Units/kg/hr (01/29/20 0733)   heparin (porcine) 10,000 Units Intravenous PRN JULISA Rodriguez    heparin (porcine) 5,000 Units Intravenous PRN JULISA Rodriguez    hydrochlorothiazide 50 mg Oral Daily Meredith Santo, CRNP    insulin lispro 2-12 Units Subcutaneous TID AC JULISA Rodriguez    multivitamin-minerals 1 tablet Oral Daily Meredith Santo, CRNP    polyethylene glycol 17 g Oral Daily PRN Linda Silva MD    QUEtiapine 25 mg Oral HS Meredith Santo, CRNP    sodium chloride 2 spray Each Nare Q1H PRN Linda Silva MD    traMADol 50 mg Oral Q6H PRN JULISA Rodriguez    warfarin 10 mg Oral Once (warfarin) Linda Silva MD    zinc gluconate 50 mg Oral Daily JULISA Rodriguez         Today, Patient Was Seen By: Linda Silva MD    ** Please Note: Dragon 360 Dictation voice to text software may have been used in the creation of this document   **

## 2020-01-29 NOTE — PHYSICAL THERAPY NOTE
PHYSICAL THERAPY NOTE    Patient Name: Ag Torres  BEXGQ'S Date: 1/29/2020 01/29/20 1528   Pain Assessment   Pain Assessment 0-10   Pain Score 3   Pain Type Acute pain   Pain Location Foot   Pain Orientation Left   Hospital Pain Intervention(s) Repositioned; Ambulation/increased activity; Distraction; Emotional support   Response to Interventions tolerated   Restrictions/Precautions   Weight Bearing Precautions Per Order Yes   LLE Weight Bearing Per Order WBAT   Braces or Orthoses CAM Boot  (LLE)   Other Precautions WBS; Multiple lines; Fall Risk;Pain   General   Chart Reviewed Yes   Response to Previous Treatment Patient with no complaints from previous session  Family/Caregiver Present No   Cognition   Overall Cognitive Status WFL   Arousal/Participation Alert; Cooperative   Attention Within functional limits   Orientation Level Oriented X4   Memory Decreased recall of precautions   Following Commands Follows one step commands with increased time or repetition   Subjective   Subjective Pt seated OOB in chair and agreeable to work w/ therapy   Bed Mobility   Supine to Sit Unable to assess   Sit to Supine Unable to assess   Additional Comments Pt seated OOB in chair upon PT arrival  Pt returned seated OOB at end of session w/ all needs within reach   Transfers   Sit to Stand 4  Minimal assistance   Additional items Assist x 1; Increased time required;Verbal cues   Stand to Sit 4  Minimal assistance   Additional items Assist x 1; Increased time required;Verbal cues   Additional Comments Transfers w/ Barbette Copping RW   Ambulation/Elevation   Gait pattern Excessively slow; Short stride; Improper Weight shift;Decreased foot clearance; Inconsistent rhoda   Gait Assistance 5  Supervision   Additional items Assist x 1;Verbal cues   Assistive Device Bariatric Rolling walker   Distance 100ft X2   Stair Management Assistance Not tested   Balance Static Sitting Good   Dynamic Sitting Fair +   Static Standing Fair   Dynamic Standing Fair -   Ambulatory Fair -   Endurance Deficit   Endurance Deficit Yes   Endurance Deficit Description pain, fatigue   Activity Tolerance   Activity Tolerance Patient tolerated treatment well;Patient limited by pain   Nurse Made Aware RN cleared pt for therapy   Assessment   Prognosis Good   Problem List Decreased strength;Decreased endurance; Impaired balance;Decreased mobility;Pain;Orthopedic restrictions   Assessment Pt presents with decreased mobility, strength, balance, and activity tolerance  Pt demonstrated ability to perform STS functional transfers at [address]  Pt ambulated 100 ft X2 with RW at supervision  Pt w/ updated orders for WBAT on LLE w/ CAM boot  Pt presents w/ improved gait this session being able to ambulate further distances w/ less assistance  Pt continues to benefit from skilled therapy to maximize functional independence  Recommendation at this time is rehab  Pt would benefit from continued ambulation, functional transfers, strength, balance, and activity tolerance  Goals updated to reflect pt's current functional mobility status   Barriers to Discharge Decreased caregiver support   Goals   Patient Goals To go to rehab   STG Expiration Date 02/09/20   Short Term Goal #2 In addition to the above stated goals, 6 Pt will demonstrate the ability to ambulate at least 150ft with least restrictive device at Mod I in order to maximize functional independence  7 Pt will demonstrate the ability to negotiate 3 steps with HR and supervision in order to return to household/community mobility  PT Treatment Day 2   Plan   Treatment/Interventions Functional transfer training;LE strengthening/ROM; Endurance training;Patient/family training;Equipment eval/education;Gait training;Spoke to nursing   Progress Progressing toward goals   PT Frequency   (3-5x/week)   Recommendation   Recommendation Post acute IP rehab   Equipment Recommended Walker  (RW)   PT - OK to Discharge Yes   Additional Comments when medically cleared to rehab     Felipe Juarez, PT, DPT

## 2020-01-30 LAB
APTT PPP: 66 SECONDS (ref 23–37)
ERYTHROCYTE [DISTWIDTH] IN BLOOD BY AUTOMATED COUNT: 13 % (ref 11.6–15.1)
F5 GENE MUT ANL BLD/T: NORMAL
GLUCOSE SERPL-MCNC: 100 MG/DL (ref 65–140)
GLUCOSE SERPL-MCNC: 109 MG/DL (ref 65–140)
GLUCOSE SERPL-MCNC: 138 MG/DL (ref 65–140)
GLUCOSE SERPL-MCNC: 141 MG/DL (ref 65–140)
HCT VFR BLD AUTO: 46.6 % (ref 34.8–46.1)
HGB BLD-MCNC: 14.6 G/DL (ref 11.5–15.4)
INR PPP: 1.38 (ref 0.84–1.19)
MCH RBC QN AUTO: 32.4 PG (ref 26.8–34.3)
MCHC RBC AUTO-ENTMCNC: 31.3 G/DL (ref 31.4–37.4)
MCV RBC AUTO: 103 FL (ref 82–98)
PLATELET # BLD AUTO: 185 THOUSANDS/UL (ref 149–390)
PMV BLD AUTO: 10.6 FL (ref 8.9–12.7)
PROTHROMBIN TIME: 16.5 SECONDS (ref 11.6–14.5)
RBC # BLD AUTO: 4.51 MILLION/UL (ref 3.81–5.12)
WBC # BLD AUTO: 10.01 THOUSAND/UL (ref 4.31–10.16)

## 2020-01-30 PROCEDURE — 85730 THROMBOPLASTIN TIME PARTIAL: CPT | Performed by: INTERNAL MEDICINE

## 2020-01-30 PROCEDURE — 85610 PROTHROMBIN TIME: CPT | Performed by: INTERNAL MEDICINE

## 2020-01-30 PROCEDURE — 82948 REAGENT STRIP/BLOOD GLUCOSE: CPT

## 2020-01-30 PROCEDURE — 99232 SBSQ HOSP IP/OBS MODERATE 35: CPT | Performed by: PHYSICIAN ASSISTANT

## 2020-01-30 PROCEDURE — 85027 COMPLETE CBC AUTOMATED: CPT | Performed by: INTERNAL MEDICINE

## 2020-01-30 RX ORDER — WARFARIN SODIUM 5 MG/1
5 TABLET ORAL
Status: DISCONTINUED | OUTPATIENT
Start: 2020-01-30 | End: 2020-01-31

## 2020-01-30 RX ADMIN — Medication 50 MG: at 08:39

## 2020-01-30 RX ADMIN — CALCIUM 1 TABLET: 500 TABLET ORAL at 10:54

## 2020-01-30 RX ADMIN — WARFARIN SODIUM 5 MG: 5 TABLET ORAL at 19:56

## 2020-01-30 RX ADMIN — QUETIAPINE FUMARATE 25 MG: 25 TABLET ORAL at 22:07

## 2020-01-30 RX ADMIN — OXYCODONE HYDROCHLORIDE AND ACETAMINOPHEN 500 MG: 500 TABLET ORAL at 08:30

## 2020-01-30 RX ADMIN — HEPARIN SODIUM AND DEXTROSE 16 UNITS/KG/HR: 10000; 5 INJECTION INTRAVENOUS at 23:14

## 2020-01-30 RX ADMIN — HEPARIN SODIUM 5000 UNITS: 1000 INJECTION INTRAVENOUS; SUBCUTANEOUS at 23:30

## 2020-01-30 RX ADMIN — DOCUSATE SODIUM 100 MG: 100 CAPSULE, LIQUID FILLED ORAL at 17:50

## 2020-01-30 RX ADMIN — HEPARIN SODIUM AND DEXTROSE 16 UNITS/KG/HR: 10000; 5 INJECTION INTRAVENOUS at 09:41

## 2020-01-30 RX ADMIN — DOCUSATE SODIUM 100 MG: 100 CAPSULE, LIQUID FILLED ORAL at 08:30

## 2020-01-30 NOTE — PROGRESS NOTES
[address] Internal Medicine  Progress Note - Ubaldo Winn 1960, 62 y o  female MRN: [id number]    Unit/Bed#: -01 Encounter: [id number]    Primary Care Provider: Billy Wen MD   Date and time admitted to hospital: 1/22/2020 10:22 PM      * Acute pulmonary embolism with acute cor pulmonale ([address] )  Assessment & Plan  · Secondary to recent foot surgery and immobility  S/p right and left EKOS catheter placement with continuous TPA and heparin infusion  Catheters removed  · Pulmonary following  Patient on heparin drip bridging to coumadin  · INR still not therapeutic, 1 30 today  Will give [id number] PO tonight  · Patient to go to rehab once INR therapeutic  Status post left foot surgery  Assessment & Plan  Patient has left boot  Podiatry evaluated and patient for rehab placement once INR therapeutic  DVT (deep venous thrombosis) ([facility])  Assessment & Plan  On heparin drip bridging to coumadin  INR 1 38 today, continue bridge    Acute respiratory failure with hypoxia ([address] )  Assessment & Plan  2/2 PE    Pulmonary hypertension ([facility] )  Assessment & Plan  · Likely 2/2 PE  · Cardiology and pulmonary following  Repeat echo EF65%  Type 2 diabetes mellitus without complication, without long-term current use of insulin [facility])  Assessment & Plan  Lab Results   Component Value Date    HGBA1C 6 7 (H) 01/21/2020       Recent Labs     01/29/20  1639 01/29/20  2033 01/30/20  0743 01/30/20  1215   POCGLU 108 172* 100 109       Blood Sugar Average: Last 72 hrs:  (P) 117 [id number]   Hold metformin  ISS    Morbid obesity with BMI of 50 0-59 9, adult (HCC)  Assessment & Plan  · Secondary to excesses calorie intake  VTE Pharmacologic Prophylaxis:   Pharmacologic: Warfarin (Coumadin)  Mechanical VTE Prophylaxis in Place: Yes    Patient Centered Rounds: I have performed bedside rounds with nursing staff today      Discussions with Specialists or Other Care Team Provider: RN    Education and Discussions with Family / Patient: patient, declined call to family     Time Spent for Care: 20 minutes  More than 50% of total time spent on counseling and coordination of care as described above  Current Length of Stay: 8 day(s)    Current Patient Status: Inpatient   Certification Statement: The patient will continue to require additional inpatient hospital stay due to awaiting therapeutic INR    Discharge Plan: when INR therapeutic to rehab    Code Status: Level 1 - Full Code      Subjective:   Patient has no acute complaints   She feels well  Denies sob, pain, edema     Objective:     Vitals:   Temp (24hrs), Av 8 °F (36 6 °C), Min:97 6 °F (36 4 °C), Max:98 °F (36 7 °C)    Temp:  [97 6 °F (36 4 °C)-98 °F (36 7 °C)] 97 6 °F (36 4 °C)  HR:  [84-90] 88  Resp:  [18-20] 18  BP: ()/(62-79) 112/69  SpO2:  [92 %-93 %] 93 %  Body mass index is 51 29 kg/m²  Input and Output Summary (last 24 hours): Intake/Output Summary (Last 24 hours) at 2020 1456  Last data filed at 2020 0701  Gross per 24 hour   Intake 503 83 ml   Output 400 ml   Net 103 83 ml       Physical Exam:     Physical Exam   Constitutional: She is oriented to person, place, and time  No distress  HENT:   Head: Normocephalic and atraumatic  Eyes: EOM are normal  No scleral icterus  Neck: Normal range of motion  Neck supple  Cardiovascular: Normal rate and regular rhythm  Pulmonary/Chest: Effort normal and breath sounds normal  No respiratory distress  She has no wheezes  Abdominal: Soft  Bowel sounds are normal  She exhibits no distension  There is no tenderness  Musculoskeletal: Normal range of motion  She exhibits no edema  Neurological: She is alert and oriented to person, place, and time  No cranial nerve deficit  Skin: Skin is warm and dry  She is not diaphoretic  Psychiatric: She has a normal mood and affect  Her behavior is normal    Vitals reviewed        Additional Data:     Labs:    Results from last 7 days   Lab Units 01/30/20  0518   WBC Thousand/uL 10 01   HEMOGLOBIN g/dL 14 6   HEMATOCRIT % 46 6*   PLATELETS Thousands/uL 185     Results from last 7 days   Lab Units 01/25/20  0547   SODIUM mmol/L 139   POTASSIUM mmol/L 3 9   CHLORIDE mmol/L 105   CO2 mmol/L 30   BUN mg/dL 11   CREATININE mg/dL 0 66   ANION GAP mmol/L 4   CALCIUM mg/dL 8 7   GLUCOSE RANDOM mg/dL 121     Results from last 7 days   Lab Units 01/30/20  0518   INR  1 38*     Results from last 7 days   Lab Units 01/30/20  1215 01/30/20  0743 01/29/20  2033 01/29/20  1639 01/29/20  1050 01/29/20  0624 01/28/20  2147 01/28/20  1635 01/28/20  1112 01/28/20  0659 01/27/20  2058 01/27/20  1646   POC GLUCOSE mg/dl 109 100 172* 108 144* 104 112 108 101 114 103 113                   * I Have Reviewed All Lab Data Listed Above  * Additional Pertinent Lab Tests Reviewed:  All Labs Within Last 24 Hours Reviewed    Imaging:    Imaging Reports Reviewed Today Include: none  Imaging Personally Reviewed by Myself Includes:  none    Recent Cultures (last 7 days):           Last 24 Hours Medication List:     Current Facility-Administered Medications:  acetaminophen 650 mg Oral Q6H PRN Srinivasan Alar, CRNP    ascorbic acid 500 mg Oral Daily Srinivasan Alar, CRNP    calcium carbonate 1 tablet Oral Daily With Breakfast Meredith Santo, CRNP    docusate sodium 100 mg Oral BID Ella Torres MD    heparin (porcine) 3-30 Units/kg/hr (Order-Specific) Intravenous Titrated Srinivasan Alar, CRNP Last Rate: 16 Units/kg/hr (01/30/20 0941)   heparin (porcine) 10,000 Units Intravenous PRN Srinivasan Alar, CRNP    heparin (porcine) 5,000 Units Intravenous PRN Srinivasan Alar, CRNP    hydrochlorothiazide 50 mg Oral Daily Meredith Santo, CRNP    insulin lispro 2-12 Units Subcutaneous TID AC Srinivasan Alar, CRNP    multivitamin-minerals 1 tablet Oral Daily Meredith Santo, CRNP    polyethylene glycol 17 g Oral Daily PRN Ella Torres MD    QUEtiapine 25 mg Oral HS Meredith Santo, CRNP    sodium chloride 2 spray Each Nare Q1H PRN Jeb Friedman MD    traMADol 50 mg Oral Q6H PRN JULISA Martinez    zinc gluconate 50 mg Oral Daily JULISA Martinez         Today, Patient Was Seen By: Sandra House PA-C    ** Please Note: Dictation voice to text software may have been used in the creation of this document   **

## 2020-01-30 NOTE — ASSESSMENT & PLAN NOTE
· Secondary to recent foot surgery and immobility  S/p right and left EKOS catheter placement with continuous TPA and heparin infusion  Catheters removed  · Pulmonary following  Patient on heparin drip bridging to coumadin  · INR still not therapeutic, 1 30 today  Will give [id number] PO tonight  · Patient to go to rehab once INR therapeutic

## 2020-01-30 NOTE — SOCIAL WORK
Cm received call from pt, she was transferred to a different floor  Pt advised that liaison from [facility] was in to see her however she does not feel acute rehab would be appropriate for her and would prefer to go to [facility]  Response received from [facility] and they would be willing to accept pt  CM made pt and floor jules Willett aware

## 2020-01-30 NOTE — ASSESSMENT & PLAN NOTE
Lab Results   Component Value Date    HGBA1C 6 7 (H) 01/21/2020       Recent Labs     01/29/20  1639 01/29/20  2033 01/30/20  0743 01/30/20  1215   POCGLU 108 172* 100 109       Blood Sugar Average: Last 72 hrs:  (P) 117 [id number]   Hold metformin  ISS

## 2020-01-31 LAB
ANION GAP SERPL CALCULATED.3IONS-SCNC: 2 MMOL/L (ref 4–13)
APTT PPP: 72 SECONDS (ref 23–37)
BUN SERPL-MCNC: 17 MG/DL (ref 5–25)
CALCIUM SERPL-MCNC: 9.3 MG/DL (ref 8.3–10.1)
CHLORIDE SERPL-SCNC: 104 MMOL/L (ref 100–108)
CO2 SERPL-SCNC: 32 MMOL/L (ref 21–32)
CREAT SERPL-MCNC: 0.71 MG/DL (ref 0.6–1.3)
F2 GENE MUT ANL BLD/T: NORMAL
GFR SERPL CREATININE-BSD FRML MDRD: 92 ML/MIN/1.73SQ M
GLUCOSE SERPL-MCNC: 101 MG/DL (ref 65–140)
GLUCOSE SERPL-MCNC: 107 MG/DL (ref 65–140)
GLUCOSE SERPL-MCNC: 114 MG/DL (ref 65–140)
GLUCOSE SERPL-MCNC: 124 MG/DL (ref 65–140)
GLUCOSE SERPL-MCNC: 83 MG/DL (ref 65–140)
INR PPP: 1.42 (ref 0.84–1.19)
POTASSIUM SERPL-SCNC: 4.8 MMOL/L (ref 3.5–5.3)
PROTHROMBIN TIME: 16.9 SECONDS (ref 11.6–14.5)
SODIUM SERPL-SCNC: 138 MMOL/L (ref 136–145)

## 2020-01-31 PROCEDURE — 85730 THROMBOPLASTIN TIME PARTIAL: CPT | Performed by: PHYSICIAN ASSISTANT

## 2020-01-31 PROCEDURE — 97530 THERAPEUTIC ACTIVITIES: CPT

## 2020-01-31 PROCEDURE — 85610 PROTHROMBIN TIME: CPT | Performed by: PHYSICIAN ASSISTANT

## 2020-01-31 PROCEDURE — 80048 BASIC METABOLIC PNL TOTAL CA: CPT | Performed by: PHYSICIAN ASSISTANT

## 2020-01-31 PROCEDURE — 97535 SELF CARE MNGMENT TRAINING: CPT

## 2020-01-31 PROCEDURE — 82948 REAGENT STRIP/BLOOD GLUCOSE: CPT

## 2020-01-31 PROCEDURE — 97116 GAIT TRAINING THERAPY: CPT

## 2020-01-31 PROCEDURE — 97110 THERAPEUTIC EXERCISES: CPT

## 2020-01-31 PROCEDURE — 99232 SBSQ HOSP IP/OBS MODERATE 35: CPT | Performed by: PHYSICIAN ASSISTANT

## 2020-01-31 RX ORDER — WARFARIN SODIUM 5 MG/1
10 TABLET ORAL
Status: DISCONTINUED | OUTPATIENT
Start: 2020-01-31 | End: 2020-02-01

## 2020-01-31 RX ADMIN — OXYCODONE HYDROCHLORIDE AND ACETAMINOPHEN 500 MG: 500 TABLET ORAL at 08:49

## 2020-01-31 RX ADMIN — DOCUSATE SODIUM 100 MG: 100 CAPSULE, LIQUID FILLED ORAL at 08:49

## 2020-01-31 RX ADMIN — QUETIAPINE FUMARATE 25 MG: 25 TABLET ORAL at 21:15

## 2020-01-31 RX ADMIN — CALCIUM 1 TABLET: 500 TABLET ORAL at 07:44

## 2020-01-31 RX ADMIN — Medication 1 TABLET: at 08:49

## 2020-01-31 RX ADMIN — HYDROCHLOROTHIAZIDE 50 MG: 25 TABLET ORAL at 08:49

## 2020-01-31 RX ADMIN — WARFARIN SODIUM 10 MG: 5 TABLET ORAL at 17:17

## 2020-01-31 RX ADMIN — Medication 50 MG: at 08:49

## 2020-01-31 RX ADMIN — HEPARIN SODIUM AND DEXTROSE 16 UNITS/KG/HR: 10000; 5 INJECTION INTRAVENOUS at 21:17

## 2020-01-31 RX ADMIN — HEPARIN SODIUM AND DEXTROSE 16 UNITS/KG/HR: 10000; 5 INJECTION INTRAVENOUS at 11:35

## 2020-01-31 NOTE — ASSESSMENT & PLAN NOTE
· Secondary to recent foot surgery and immobility  S/p right and left EKOS catheter placement with continuous TPA and heparin infusion  Catheters removed  · Pulmonary following  Patient on heparin drip bridging to coumadin  · INR still not therapeutic, 1 42 today  Will give [id number] PO tonight      · Patient to go to rehab once INR therapeutic

## 2020-01-31 NOTE — PHYSICAL THERAPY NOTE
Physical Therapy Treatment Note     01/31/20 1149   Pain Assessment   Pain Assessment 0-10   Pain Score 2   Pain Type Acute pain   Pain Location Foot   Pain Orientation Left   Restrictions/Precautions   Weight Bearing Precautions Per Order Yes   LLE Weight Bearing Per Order WBAT   Braces or Orthoses CAM Boot; Other (Comment)  (LLE)   Other Precautions WBS; Fall Risk;Pain;Multiple lines   General   Chart Reviewed Yes   Cognition   Overall Cognitive Status WFL   Subjective   Subjective Pt  standing in bathroom upon entry  Agreeable to PT  Reported no pain initially however pain reported post session  Transfers   Sit to Stand 5  Supervision   Additional items Assist x 1; Increased time required;Armrests   Stand to Sit 5  Supervision   Additional items Assist x 1; Armrests; Increased time required   Stand pivot 4  Minimal assistance   Additional items Assist x 1; Increased time required   Ambulation/Elevation   Gait pattern Improper Weight shift;Decreased foot clearance; Forward Flexion;Decreased L stance; Short stride; Inconsistent rhoda   Gait Assistance 5  Supervision   Additional items Assist x 1;Verbal cues   Assistive Device Bariatric Rolling walker   Distance 120ft x 2, 50ft   Stair Management Assistance 4  Minimal assist   Additional items Assist x 1;Verbal cues; Increased time required   Stair Management Technique Two rails; Foreward;Backward;Nonreciprocal   Number of Stairs 2   Balance   Static Sitting Good   Ambulatory Fair -   Endurance Deficit   Endurance Deficit Yes   Endurance Deficit Description Pain, Fatigue   Activity Tolerance   Activity Tolerance Patient tolerated treatment well;Patient limited by pain; Patient limited by fatigue   Nurse Made Aware Yes   Exercises   TKR Sitting;Bilateral;Right;AROM   Assessment   Prognosis Good   Problem List Decreased strength;Decreased range of motion;Decreased endurance; Impaired balance;Decreased mobility;Pain;Orthopedic restrictions   Assessment Pt  progressing well with mobility however fatigues with exertion  Pt  reported incresed pain post session in the L shin area  Pt  had the CAM boot in t/o session  Adjusted the RW to fit patient to avoid forward flexion  Cues given for LE sequencing to negotiate steps  Pt  lives alone and is below her PLOF needing assist for safe mobility  Pt  reported her steps are wide enough to place the RW  Pt  will benefit from continued skilled PT to maximize safe functyional mobility and improve endurance  Barriers to Discharge Inaccessible home environment;Decreased caregiver support   Goals   Patient Goals Go to rehab   STG Expiration Date 02/09/20   PT Treatment Day 3   Plan   Treatment/Interventions Functional transfer training;LE strengthening/ROM; Elevations; Therapeutic exercise; Endurance training;Patient/family training;Equipment eval/education;Gait training;Spoke to nursing;Spoke to case management   Progress Progressing toward goals   PT Frequency Other (Comment)  (3-5x/week)   Recommendation   Recommendation Post acute IP rehab   Equipment Recommended Other (Comment)  (pt  has RW at home per her)   PT - OK to Discharge Yes     Pt  Needed 2 standing rests during 120ft  ambulation        Fili Blanchard, PTA

## 2020-01-31 NOTE — PLAN OF CARE
Problem: PHYSICAL THERAPY ADULT  Goal: Performs mobility at highest level of function for planned discharge setting  See evaluation for individualized goals  Description  Treatment/Interventions: Functional transfer training, LE strengthening/ROM, Therapeutic exercise, Endurance training, Patient/family training, Equipment eval/education, Bed mobility, Gait training, Spoke to nursing  Equipment Recommended: Arpit Carter       See flowsheet documentation for full assessment, interventions and recommendations  Outcome: Progressing  Note:   Prognosis: Good  Problem List: Decreased strength, Decreased range of motion, Decreased endurance, Impaired balance, Decreased mobility, Pain, Orthopedic restrictions  Assessment: Pt  progressing well with mobility however fatigues with exertion  Pt  reported incresed pain post session in the L shin area  Pt  had the CAM boot in t/o session  Adjusted the RW to fit patient to avoid forward flexion  Cues given for LE sequencing to negotiate steps  Pt  lives alone and is below her PLOF needing assist for safe mobility  Pt  reported her steps are wide enough to place the RW  Pt  will benefit from continued skilled PT to maximize safe functyional mobility and improve endurance  Barriers to Discharge: Inaccessible home environment, Decreased caregiver support     Recommendation: Post acute IP rehab     PT - OK to Discharge: Yes    See flowsheet documentation for full assessment

## 2020-01-31 NOTE — OCCUPATIONAL THERAPY NOTE
OccupationalTherapy Progress Note     Patient Name: Dena Lilly  LSCWX'I Date: 1/31/2020  Problem List  Principal Problem:    Acute pulmonary embolism with acute cor pulmonale ([facility] )  Active Problems: Morbid obesity with BMI of 50 0-59 9, adult ([facility] )    Type 2 diabetes mellitus without complication, without long-term current use of insulin (HCC)    Pulmonary hypertension (HCC)    Acute respiratory failure with hypoxia (HCC)    DVT (deep venous thrombosis) ([place] [address] )    Status post left foot surgery            01/31/20 0943   Restrictions/Precautions   Weight Bearing Precautions Per Order Yes   LLE Weight Bearing Per Order WBAT   Braces or Orthoses CAM Boot  (LLE)   Other Precautions WBS; Multiple lines; Fall Risk;Pain   General   Response to Previous Treatment Patient with no complaints from previous session   Lifestyle   Autonomy Pt reports being I with ADLS, IADLS and uses knee scooter for mobility PTA  Prior to L LE injury pt mobilized without device and drove  Reciprocal Relationships Pt lives alone and reports that her children live in [facility] and are not able to assist     Service to Others Works full time from home as a     Intrinsic Gratification Playing 3Nod   Pain Assessment   Pain Assessment 0-10   Pain Score 4   Pain Location Foot   Pain Orientation Left   Hospital Pain Intervention(s) Repositioned; Ambulation/increased activity; Emotional support   Response to Interventions Tolerated   Transfers   Sit to Stand 4  Minimal assistance   Additional items Assist x 1; Increased time required   Stand to Sit 4  Minimal assistance   Additional items Assist x 1; Increased time required   Functional Mobility   Functional Mobility 5  Supervision   Additional Comments Pt demonstrated household mobility with RW  Pt required some assistance with steering walker as she bumped into things on R side, which improved quickly throughout session     Additional items Rolling walker   Cognition   Overall Cognitive Status WFL   Arousal/Participation Alert; Cooperative   Attention Within functional limits   Orientation Level Oriented X4   Memory Within functional limits   Following Commands Follows one step commands without difficulty   Comments Pt is very pleasant and cooperative to work with therapy  Appears to have better insight into deficits this date as she is reporting she thinks rehab would be better than returning home alone  Activity Tolerance   Activity Tolerance Patient limited by fatigue;Patient limited by pain   Medical Staff Made Aware RN confirmed okay to see pt    Plan   Treatment Interventions ADL retraining;Functional transfer training; Endurance training;Patient/family training;Equipment evaluation/education; Compensatory technique education;Continued evaluation; Energy conservation; Activityengagement   Goal Expiration Date 02/05/20   OT Treatment Day 2   OT Frequency 3-5x/wk   Recommendation   OT Discharge Recommendation Short Term Rehab   OT - OK to Discharge Yes  (When medically appropriate)   Modified Karen Scale   Modified [place] Scale 4      JO Rincon, OTR/L

## 2020-01-31 NOTE — ASSESSMENT & PLAN NOTE
Lab Results   Component Value Date    HGBA1C 6 7 (H) 01/21/2020       Recent Labs     01/30/20  1556 01/30/20  2103 01/31/20  0712 01/31/20  1151   POCGLU 138 141* 101 83       Blood Sugar Average: Last 72 hrs:  (P) 206 [id number]   Hold metformin  ISS

## 2020-01-31 NOTE — PROGRESS NOTES
[facility]  Progress Note - Steve Janice 1960, 62 y o  female MRN: [id number]    Unit/Bed#: -01 Encounter: [id number]    Primary Care Provider: Delmar Olvera MD   Date and time admitted to hospital: 1/22/2020 10:22 PM       * Acute pulmonary embolism with acute cor pulmonale ([address] )  Assessment & Plan  · Secondary to recent foot surgery and immobility  S/p right and left EKOS catheter placement with continuous TPA and heparin infusion  Catheters removed  · Pulmonary following  Patient on heparin drip bridging to coumadin  · INR still not therapeutic, 1 42 today  Will give [id number] PO tonight  · Patient to go to rehab once INR therapeutic    Status post left foot surgery  Assessment & Plan  Patient has left boot  Podiatry evaluated and patient for rehab placement once INR therapeutic  DVT (deep venous thrombosis) (HCC)  Assessment & Plan  On heparin drip bridging to coumadin  INR 1 42 today     Acute respiratory failure with hypoxia ([facility] )  Assessment & Plan  2/2 PE    Pulmonary hypertension (HCC)  Assessment & Plan  · Likely 2/2 PE  · Cardiology and pulmonary following  Repeat echo EF65%  Type 2 diabetes mellitus without complication, without long-term current use of insulin [facility])  Assessment & Plan  Lab Results   Component Value Date    HGBA1C 6 7 (H) 01/21/2020       Recent Labs     01/30/20  1556 01/30/20  2103 01/31/20  0712 01/31/20  1151   POCGLU 138 141* 101 83       Blood Sugar Average: Last 72 hrs:  (P) 800 [id number]   Hold metformin  ISS    Morbid obesity with BMI of 50 0-59 9, adult (HCC)  Assessment & Plan  · Secondary to excesses calorie intake  VTE Pharmacologic Prophylaxis:   Pharmacologic: Warfarin (Coumadin)  Mechanical VTE Prophylaxis in Place: Yes    Patient Centered Rounds: I have performed bedside rounds with nursing staff today      Discussions with Specialists or Other Care Team Provider: RN    Education and Discussions with Family / Patient: patient, declined call to family    Time Spent for Care: 20 minutes  More than 50% of total time spent on counseling and coordination of care as described above  Current Length of Stay: 9 day(s)    Current Patient Status: Inpatient   Certification Statement: The patient will continue to require additional inpatient hospital stay due to pending therapeutic INR     Discharge Plan: when INR therapeutic     Code Status: Level 1 - Full Code      Subjective:   Patient has no acute complaints  She feels well  No MOORE, sob    Objective:     Vitals:   Temp (24hrs), Av 2 °F (36 8 °C), Min:98 °F (36 7 °C), Max:98 3 °F (36 8 °C)    Temp:  [98 °F (36 7 °C)-98 3 °F (36 8 °C)] 98 3 °F (36 8 °C)  HR:  [79-86] 79  Resp:  [18-20] 18  BP: (109-137)/(67-78) 137/67  SpO2:  [93 %-97 %] 94 %  Body mass index is 51 29 kg/m²  Input and Output Summary (last 24 hours): Intake/Output Summary (Last 24 hours) at 2020 1551  Last data filed at 2020 1256  Gross per 24 hour   Intake 1839 99 ml   Output    Net 1839 99 ml       Physical Exam:     Physical Exam   Constitutional: She is oriented to person, place, and time  No distress  HENT:   Head: Normocephalic and atraumatic  Eyes: EOM are normal  No scleral icterus  Neck: Normal range of motion  Neck supple  Cardiovascular: Normal rate and regular rhythm  Pulmonary/Chest: Effort normal and breath sounds normal  No respiratory distress  She has no wheezes  Abdominal: Soft  Bowel sounds are normal  She exhibits no distension  There is no tenderness  Musculoskeletal: Normal range of motion  She exhibits no edema  Neurological: She is alert and oriented to person, place, and time  Skin: Skin is warm and dry  She is not diaphoretic  Psychiatric: She has a normal mood and affect  Her behavior is normal    Vitals reviewed        Additional Data:     Labs:    Results from last 7 days   Lab Units 20  0518   WBC Thousand/uL 10 01 HEMOGLOBIN g/dL 14 6   HEMATOCRIT % 46 6*   PLATELETS Thousands/uL 185     Results from last 7 days   Lab Units 01/31/20  0512   SODIUM mmol/L 138   POTASSIUM mmol/L 4 8   CHLORIDE mmol/L 104   CO2 mmol/L 32   BUN mg/dL 17   CREATININE mg/dL 0 71   ANION GAP mmol/L 2*   CALCIUM mg/dL 9 3   GLUCOSE RANDOM mg/dL 114     Results from last 7 days   Lab Units 01/31/20  0512   INR  1 42*     Results from last 7 days   Lab Units 01/31/20  1151 01/31/20  0712 01/30/20  2103 01/30/20  1556 01/30/20  1215 01/30/20  0743 01/29/20  2033 01/29/20  1639 01/29/20  1050 01/29/20  0624 01/28/20  2147 01/28/20  1635   POC GLUCOSE mg/dl 83 101 141* 138 109 100 172* 108 144* 104 112 108                   * I Have Reviewed All Lab Data Listed Above  * Additional Pertinent Lab Tests Reviewed:  All Labs Within Last 24 Hours Reviewed    Imaging:    Imaging Reports Reviewed Today Include: none  Imaging Personally Reviewed by Myself Includes:  none    Recent Cultures (last 7 days):           Last 24 Hours Medication List:     Current Facility-Administered Medications:  acetaminophen 650 mg Oral Q6H PRN Batool Boast, CRNP    ascorbic acid 500 mg Oral Daily Batool Boast, CRNP    calcium carbonate 1 tablet Oral Daily With Breakfast Meredith Santo, CRNP    docusate sodium 100 mg Oral BID Marc Wheeler MD    heparin (porcine) 3-30 Units/kg/hr (Order-Specific) Intravenous Titrated Batool Boast, CRNP Last Rate: 16 Units/kg/hr (01/31/20 1135)   heparin (porcine) 10,000 Units Intravenous PRN Batool Boast, CRNP    heparin (porcine) 5,000 Units Intravenous PRN Batool Boast, CRNP    hydrochlorothiazide 50 mg Oral Daily Meredith Santo, CRNP    insulin lispro 2-12 Units Subcutaneous TID AC Batool Boast, CRNP    multivitamin-minerals 1 tablet Oral Daily Meredith Santo, CRNP    polyethylene glycol 17 g Oral Daily PRN Marc Wheeler MD    QUEtiapine 25 mg Oral HS Meredith Santo, CRNP    sodium chloride 2 spray Each Nare Q1H PRN Marc Wheeler MD    traMADol 50 mg Oral Q6H PRN JULISA Rodriguez    warfarin 10 mg Oral Daily (warfarin) aMry Paz PA-C    zinc gluconate 50 mg Oral Daily JULISA Rodriguez         Today, Patient Was Seen By: Paula Diaz PA-C    ** Please Note: Dictation voice to text software may have been used in the creation of this document   **

## 2020-01-31 NOTE — PLAN OF CARE
Problem: OCCUPATIONAL THERAPY ADULT  Goal: Performs self-care activities at highest level of function for planned discharge setting  See evaluation for individualized goals  Description  Treatment Interventions: ADL retraining, Functional transfer training, UE strengthening/ROM, Endurance training, Patient/family training, Equipment evaluation/education, Compensatory technique education, Continued evaluation, Energy conservation, Activityengagement          See flowsheet documentation for full assessment, interventions and recommendations  Outcome: Progressing  Note:   Limitation: Decreased ADL status, Decreased endurance, Decreased high-level ADLs  Prognosis: Good  Assessment: Patient participated in Skilled OT session this date with interventions consisting of ADL re training with the use of correct body mechnaics, Energy Conservation techniques, deep breathing technique, safety awareness and fall prevention techniques,  therapeutic activities to: increase activity tolerance, increase dynamic sit/ stand balance during functional activity  and increase OOB/ sitting tolerance   Patient agreeable to OT treatment session, upon arrival patient was found seated OOB to Chair  Pt participated in functional transfers, mobility, and self care tasks  Per podiatry, Pt is now WBAT LLE in CAM walker boot  Pt confirmed that she does not have any social support upon D/C and is afraid to return home as she requires A for self care, transfers, and mobility at this time  Pt stated that she is highly fearful of shower transfers and completing household tasks upon D/C  Pt states she would like to go to rehab for increased strengthening and endurance for increased IND w/ self care tasks before returning home alone  Patient requiring verbal cues for safety, verbal cues for pacing thru activity steps, cognitive assistance to anticipate next step and frequent rest periods   Patient continues to be functioning below baseline level, occupational performance remains limited secondary to factors listed above and increased risk for falls and injury  From OT standpoint, recommendation at time of d/c would be Short Term Rehab  Patient to benefit from continued Occupational Therapy treatment while in the hospital to address deficits as defined above and maximize level of functional independence with ADLs and functional mobility        OT Discharge Recommendation: Short Term Rehab  OT - OK to Discharge: Yes(When medically appropriate)

## 2020-02-01 PROBLEM — Z51.81 SUBTHERAPEUTIC ANTICOAGULATION: Status: ACTIVE | Noted: 2020-02-01

## 2020-02-01 PROBLEM — Z79.01 SUBTHERAPEUTIC ANTICOAGULATION: Status: ACTIVE | Noted: 2020-02-01

## 2020-02-01 LAB
APTT PPP: 58 SECONDS (ref 23–37)
APTT PPP: 72 SECONDS (ref 23–37)
APTT PPP: 96 SECONDS (ref 23–37)
GLUCOSE SERPL-MCNC: 105 MG/DL (ref 65–140)
GLUCOSE SERPL-MCNC: 109 MG/DL (ref 65–140)
GLUCOSE SERPL-MCNC: 130 MG/DL (ref 65–140)
GLUCOSE SERPL-MCNC: 136 MG/DL (ref 65–140)
INR PPP: 1.32 (ref 0.84–1.19)
INR PPP: 1.38 (ref 0.84–1.19)
PROTHROMBIN TIME: 16 SECONDS (ref 11.6–14.5)
PROTHROMBIN TIME: 16.5 SECONDS (ref 11.6–14.5)

## 2020-02-01 PROCEDURE — 85730 THROMBOPLASTIN TIME PARTIAL: CPT | Performed by: INTERNAL MEDICINE

## 2020-02-01 PROCEDURE — 85610 PROTHROMBIN TIME: CPT | Performed by: PHYSICIAN ASSISTANT

## 2020-02-01 PROCEDURE — 85610 PROTHROMBIN TIME: CPT | Performed by: NURSE PRACTITIONER

## 2020-02-01 PROCEDURE — 99232 SBSQ HOSP IP/OBS MODERATE 35: CPT | Performed by: NURSE PRACTITIONER

## 2020-02-01 PROCEDURE — 85730 THROMBOPLASTIN TIME PARTIAL: CPT | Performed by: PHYSICIAN ASSISTANT

## 2020-02-01 PROCEDURE — 82948 REAGENT STRIP/BLOOD GLUCOSE: CPT

## 2020-02-01 RX ADMIN — WARFARIN SODIUM 12 MG: 5 TABLET ORAL at 17:11

## 2020-02-01 RX ADMIN — CALCIUM 1 TABLET: 500 TABLET ORAL at 12:04

## 2020-02-01 RX ADMIN — QUETIAPINE FUMARATE 25 MG: 25 TABLET ORAL at 21:39

## 2020-02-01 RX ADMIN — DOCUSATE SODIUM 100 MG: 100 CAPSULE, LIQUID FILLED ORAL at 17:11

## 2020-02-01 RX ADMIN — HEPARIN SODIUM 5000 UNITS: 1000 INJECTION INTRAVENOUS; SUBCUTANEOUS at 21:39

## 2020-02-01 RX ADMIN — DOCUSATE SODIUM 100 MG: 100 CAPSULE, LIQUID FILLED ORAL at 08:54

## 2020-02-01 RX ADMIN — Medication 50 MG: at 12:05

## 2020-02-01 RX ADMIN — Medication 1 TABLET: at 08:54

## 2020-02-01 RX ADMIN — HYDROCHLOROTHIAZIDE 50 MG: 25 TABLET ORAL at 08:54

## 2020-02-01 RX ADMIN — HEPARIN SODIUM AND DEXTROSE 14 UNITS/KG/HR: 10000; 5 INJECTION INTRAVENOUS at 08:57

## 2020-02-01 RX ADMIN — HEPARIN SODIUM AND DEXTROSE 16 UNITS/KG/HR: 10000; 5 INJECTION INTRAVENOUS at 22:36

## 2020-02-01 RX ADMIN — OXYCODONE HYDROCHLORIDE AND ACETAMINOPHEN 500 MG: 500 TABLET ORAL at 08:54

## 2020-02-01 NOTE — ASSESSMENT & PLAN NOTE
· Patient has left boot  Podiatry evaluated and patient for rehab placement once INR therapeutic    · Surgery mid December 16th

## 2020-02-01 NOTE — ASSESSMENT & PLAN NOTE
· Secondary to recent foot surgery and immobility  S/p right and left EKOS catheter placement with continuous TPA and heparin infusion  Catheters removed  · Pulmonary following  Patient on heparin drip bridging to coumadin  · INR still not therapeutic, 1 42 yesterday and 1 38 today    Will increase coumadin to 12mg tonight  · Labs in am   · Patient to go to rehab once INR therapeutic

## 2020-02-01 NOTE — ASSESSMENT & PLAN NOTE
Lab Results   Component Value Date    HGBA1C 6 7 (H) 01/21/2020       Recent Labs     01/31/20  1543 01/31/20  2103 02/01/20  0616 02/01/20  1148   POCGLU 107 124 136 105       Blood Sugar Average: Last 72 hrs:  (P) 119 [id number]   · Hold metformin  · ISS

## 2020-02-01 NOTE — PLAN OF CARE
Problem: Potential for Falls  Goal: Patient will remain free of falls  Description  INTERVENTIONS:  - Assess patient frequently for physical needs  -  Identify cognitive and physical deficits and behaviors that affect risk of falls    -  Mittie fall precautions as indicated by assessment   - Educate patient/family on patient safety including physical limitations  - Instruct patient to call for assistance with activity based on assessment  - Modify environment to reduce risk of injury  - Consider OT/PT consult to assist with strengthening/mobility  Outcome: Progressing     Problem: Prexisting or High Potential for Compromised Skin Integrity  Goal: Skin integrity is maintained or improved  Description  INTERVENTIONS:  - Identify patients at risk for skin breakdown  - Assess and monitor skin integrity  - Assess and monitor nutrition and hydration status  - Monitor labs   - Assess for incontinence   - Turn and reposition patient  - Assist with mobility/ambulation  - Relieve pressure over bony prominences  - Avoid friction and shearing  - Provide appropriate hygiene as needed including keeping skin clean and dry  - Evaluate need for skin moisturizer/barrier cream  - Collaborate with interdisciplinary team   - Patient/family teaching  - Consider wound care consult   Outcome: Progressing     Problem: PAIN - ADULT  Goal: Verbalizes/displays adequate comfort level or baseline comfort level  Description  Interventions:  - Encourage patient to monitor pain and request assistance  - Assess pain using appropriate pain scale  - Administer analgesics based on type and severity of pain and evaluate response  - Implement non-pharmacological measures as appropriate and evaluate response  - Consider cultural and social influences on pain and pain management  - Notify physician/advanced practitioner if interventions unsuccessful or patient reports new pain  Outcome: Progressing     Problem: INFECTION - ADULT  Goal: Absence or prevention of progression during hospitalization  Description  INTERVENTIONS:  - Assess and monitor for signs and symptoms of infection  - Monitor lab/diagnostic results  - Monitor all insertion sites, i e  indwelling lines, tubes, and drains  - Monitor endotracheal if appropriate and nasal secretions for changes in amount and color  - Sprague appropriate cooling/warming therapies per order  - Administer medications as ordered  - Instruct and encourage patient and family to use good hand hygiene technique  - Identify and instruct in appropriate isolation precautions for identified infection/condition  Outcome: Progressing     Problem: SAFETY ADULT  Goal: Maintain or return to baseline ADL function  Description  INTERVENTIONS:  -  Assess patient's ability to carry out ADLs; assess patient's baseline for ADL function and identify physical deficits which impact ability to perform ADLs (bathing, care of mouth/teeth, toileting, grooming, dressing, etc )  - Assess/evaluate cause of self-care deficits   - Assess range of motion  - Assess patient's mobility; develop plan if impaired  - Assess patient's need for assistive devices and provide as appropriate  - Encourage maximum independence but intervene and supervise when necessary  - Involve family in performance of ADLs  - Assess for home care needs following discharge   - Consider OT consult to assist with ADL evaluation and planning for discharge  - Provide patient education as appropriate  Outcome: Progressing  Goal: Maintain or return mobility status to optimal level  Description  INTERVENTIONS:  - Assess patient's baseline mobility status (ambulation, transfers, stairs, etc )    - Identify cognitive and physical deficits and behaviors that affect mobility  - Identify mobility aids required to assist with transfers and/or ambulation (gait belt, sit-to-stand, lift, walker, cane, etc )  - Sprague fall precautions as indicated by assessment  - Record patient progress and toleration of activity level on Mobility SBAR; progress patient to next Phase/Stage  - Instruct patient to call for assistance with activity based on assessment  - Consider rehabilitation consult to assist with strengthening/weightbearing, etc   Outcome: Progressing     Problem: DISCHARGE PLANNING  Goal: Discharge to home or other facility with appropriate resources  Description  INTERVENTIONS:  - Identify barriers to discharge w/patient and caregiver  - Arrange for needed discharge resources and transportation as appropriate  - Identify discharge learning needs (meds, wound care, etc )  - Arrange for interpretive services to assist at discharge as needed  - Refer to Case Management Department for coordinating discharge planning if the patient needs post-hospital services based on physician/advanced practitioner order or complex needs related to functional status, cognitive ability, or social support system  Outcome: Progressing     Problem: Knowledge Deficit  Goal: Patient/family/caregiver demonstrates understanding of disease process, treatment plan, medications, and discharge instructions  Description  Complete learning assessment and assess knowledge base    Interventions:  - Provide teaching at level of understanding  - Provide teaching via preferred learning methods  Outcome: Progressing     Problem: CARDIOVASCULAR - ADULT  Goal: Maintains optimal cardiac output and hemodynamic stability  Description  INTERVENTIONS:  - Monitor I/O, vital signs and rhythm  - Monitor for S/S and trends of decreased cardiac output  - Administer and titrate ordered vasoactive medications to optimize hemodynamic stability  - Assess quality of pulses, skin color and temperature  - Assess for signs of decreased coronary artery perfusion  - Instruct patient to report change in severity of symptoms  Outcome: Progressing  Goal: Absence of cardiac dysrhythmias or at baseline rhythm  Description  INTERVENTIONS:  - Continuous cardiac monitoring, vital signs, obtain 12 lead EKG if ordered  - Administer antiarrhythmic and heart rate control medications as ordered  - Monitor electrolytes and administer replacement therapy as ordered  Outcome: Progressing     Problem: RESPIRATORY - ADULT  Goal: Achieves optimal ventilation and oxygenation  Description  INTERVENTIONS:  - Assess for changes in respiratory status  - Assess for changes in mentation and behavior  - Position to facilitate oxygenation and minimize respiratory effort  - Oxygen administered by appropriate delivery if ordered  - Initiate smoking cessation education as indicated  - Encourage broncho-pulmonary hygiene including cough, deep breathe, Incentive Spirometry  - Assess the need for suctioning and aspirate as needed  - Assess and instruct to report SOB or any respiratory difficulty  - Respiratory Therapy support as indicated  Outcome: Progressing     Problem: Nutrition/Hydration-ADULT  Goal: Nutrient/Hydration intake appropriate for improving, restoring or maintaining nutritional needs  Description  Monitor and assess patient's nutrition/hydration status for malnutrition  Collaborate with interdisciplinary team and initiate plan and interventions as ordered  Monitor patient's weight and dietary intake as ordered or per policy  Utilize nutrition screening tool and intervene as necessary  Determine patient's food preferences and provide high-protein, high-caloric foods as appropriate       INTERVENTIONS:  - Monitor oral intake, urinary output, labs, and treatment plans  - Assess nutrition and hydration status and recommend course of action  - Evaluate amount of meals eaten  - Assist patient with eating if necessary   - Allow adequate time for meals  - Recommend/ encourage appropriate diets, oral nutritional supplements, and vitamin/mineral supplements  - Order, calculate, and assess calorie counts as needed  - Recommend, monitor, and adjust tube feedings and TPN/PPN based on assessed needs  - Assess need for intravenous fluids  - Provide specific nutrition/hydration education as appropriate  - Include patient/family/caregiver in decisions related to nutrition  Outcome: Progressing     Problem: GENITOURINARY - ADULT  Goal: Maintains or returns to baseline urinary function  Description  INTERVENTIONS:  - Assess urinary function  - Encourage oral fluids to ensure adequate hydration if ordered  - Administer IV fluids as ordered to ensure adequate hydration  - Administer ordered medications as needed  - Offer frequent toileting  - Follow urinary retention protocol if ordered  Outcome: Progressing

## 2020-02-01 NOTE — PROGRESS NOTES
[facility]  Progress Note - Bimal Damian 1960, 62 y o  female MRN: [id number]    Unit/Bed#: -01 Encounter: [id number]    Primary Care Provider: Christine Simon MD   Date and time admitted to hospital: 1/22/2020 10:22 PM        * Acute pulmonary embolism with acute cor pulmonale ([address] )  Assessment & Plan  · Secondary to recent foot surgery and immobility  S/p right and left EKOS catheter placement with continuous TPA and heparin infusion  Catheters removed  · Pulmonary following  Patient on heparin drip bridging to coumadin  · INR still not therapeutic, 1 42 yesterday and 1 38 today   Will increase coumadin to 12mg tonight  · Labs in am   · Patient to go to rehab once INR therapeutic    DVT (deep venous thrombosis) (HCC)  Assessment & Plan  · On heparin drip bridging to coumadin  INR 1 42 yesterday, 1 38 today  Subtherapeutic anticoagulation  Assessment & Plan  · 1 42 yesterday, 1 38 today  · Increase coumadin tonight  · Labs in am     Pulmonary hypertension (HCC)  Assessment & Plan  · Likely 2/2 PE  · Cardiology and pulmonary following  Repeat echo EF65%  Status post left foot surgery  Assessment & Plan  · Patient has left boot  Podiatry evaluated and patient for rehab placement once INR therapeutic  · Surgery mid December 16th    Type 2 diabetes mellitus without complication, without long-term current use of insulin [facility])  Assessment & Plan  Lab Results   Component Value Date    HGBA1C 6 7 (H) 01/21/2020       Recent Labs     01/31/20  1543 01/31/20  2103 02/01/20  0616 02/01/20  1148   POCGLU 107 124 136 105       Blood Sugar Average: Last 72 hrs:  (P) 119 [id number]   · Hold metformin  · ISS    Morbid obesity with BMI of 50 0-59 9, adult (HCC)  Assessment & Plan  · Secondary to excesses calorie intake         VTE Pharmacologic Prophylaxis:   Pharmacologic: Heparin Drip  Mechanical VTE Prophylaxis in Place: No    Patient Centered Rounds: I have performed bedside rounds with nursing staff today  Discussions with Specialists or Other Care Team Provider: none    Education and Discussions with Family / Patient: patient    Time Spent for Care: 20 minutes  More than 50% of total time spent on counseling and coordination of care as described above  Current Length of Stay: 10 day(s)    Current Patient Status: Inpatient   Certification Statement: The patient will continue to require additional inpatient hospital stay due to further evaluation and monitoring of subtherapeutic     Discharge Plan: pending therapeutic inr     Code Status: Level 1 - Full Code      Subjective:   Patient sitting out in chair  Reports feeling good and ready to go to rehab  Offers no complaints  Objective:     Vitals:   Temp (24hrs), Av 2 °F (36 8 °C), Min:98 1 °F (36 7 °C), Max:98 3 °F (36 8 °C)    Temp:  [98 1 °F (36 7 °C)-98 3 °F (36 8 °C)] 98 3 °F (36 8 °C)  HR:  [79-83] 83  Resp:  [18] 18  BP: (126-145)/(67-79) 145/71  SpO2:  [92 %-95 %] 92 %  Body mass index is 51 29 kg/m²  Input and Output Summary (last 24 hours): Intake/Output Summary (Last 24 hours) at 2020 1235  Last data filed at 2020 0856  Gross per 24 hour   Intake 910 ml   Output 0 ml   Net 910 ml       Physical Exam:     Physical Exam   Constitutional: She is oriented to person, place, and time  She appears well-nourished  obese   HENT:   Head: Normocephalic and atraumatic  Neck: Normal range of motion  Neck supple  Cardiovascular: Normal rate and regular rhythm  No murmur heard  Pulmonary/Chest: Effort normal and breath sounds normal  No respiratory distress  She has no wheezes  Abdominal: Soft  Bowel sounds are normal    Musculoskeletal: She exhibits edema (BL LE mild)  Left foot boot    Neurological: She is alert and oriented to person, place, and time  Skin: Skin is warm and dry  Psychiatric: She has a normal mood and affect   Her behavior is normal  Judgment and thought content normal  Additional Data:     Labs:    Results from last 7 days   Lab Units 01/30/20  0518   WBC Thousand/uL 10 01   HEMOGLOBIN g/dL 14 6   HEMATOCRIT % 46 6*   PLATELETS Thousands/uL 185     Results from last 7 days   Lab Units 01/31/20  0512   SODIUM mmol/L 138   POTASSIUM mmol/L 4 8   CHLORIDE mmol/L 104   CO2 mmol/L 32   BUN mg/dL 17   CREATININE mg/dL 0 71   ANION GAP mmol/L 2*   CALCIUM mg/dL 9 3   GLUCOSE RANDOM mg/dL 114     Results from last 7 days   Lab Units 02/01/20  0552   INR  1 38*     Results from last 7 days   Lab Units 02/01/20  1148 02/01/20  0616 01/31/20  2103 01/31/20  1543 01/31/20  1151 01/31/20  0712 01/30/20  2103 01/30/20  1556 01/30/20  1215 01/30/20  0743 01/29/20  2033 01/29/20  1639   POC GLUCOSE mg/dl 105 136 124 107 83 101 141* 138 109 100 172* 108                   * I Have Reviewed All Lab Data Listed Above  * Additional Pertinent Lab Tests Reviewed:  [facility] Admission Reviewed    Imaging:    Imaging Reports Reviewed Today Include: none  Imaging Personally Reviewed by Myself Includes: none    Recent Cultures (last 7 days):           Last 24 Hours Medication List:     Current Facility-Administered Medications:  acetaminophen 650 mg Oral Q6H PRN Ronnie Pulse, CRNP    ascorbic acid 500 mg Oral Daily Ronnie Pulse, CRNP    calcium carbonate 1 tablet Oral Daily With Breakfast Merediht Santo, CRNP    docusate sodium 100 mg Oral BID Gaviota Morales MD    heparin (porcine) 3-30 Units/kg/hr (Order-Specific) Intravenous Titrated Ronnie Pulse, CRNP Last Rate: 14 Units/kg/hr (02/01/20 0857)   heparin (porcine) 10,000 Units Intravenous PRN Ronnie Pulse, CRNP    heparin (porcine) 5,000 Units Intravenous PRN Ronnie Pulse, CRNP    hydrochlorothiazide 50 mg Oral Daily Meredith Santo, CRNP    insulin lispro 2-12 Units Subcutaneous TID AC Ronnie Pulse, CRNP    multivitamin-minerals 1 tablet Oral Daily Meredith Santo, CRNP    polyethylene glycol 17 g Oral Daily PRN Gaviota Morales MD QUEtiapine 25 mg Oral HS JULISA Hensley    sodium chloride 2 spray Each Nare Q1H PRN Ismael Jansen MD    traMADol 50 mg Oral Q6H PRN JULISA Chambers    warfarin 12 mg Oral Daily (warfarin) JULISA Flores    zinc gluconate 50 mg Oral Daily JULISA Chambers         Today, Patient Was Seen By: JULISA Flores    ** Please Note: Dictation voice to text software may have been used in the creation of this document   **

## 2020-02-02 LAB
ANION GAP SERPL CALCULATED.3IONS-SCNC: 4 MMOL/L (ref 4–13)
APTT PPP: 61 SECONDS (ref 23–37)
APTT PPP: 73 SECONDS (ref 23–37)
APTT PPP: 91 SECONDS (ref 23–37)
BUN SERPL-MCNC: 15 MG/DL (ref 5–25)
CALCIUM SERPL-MCNC: 9.3 MG/DL (ref 8.3–10.1)
CHLORIDE SERPL-SCNC: 103 MMOL/L (ref 100–108)
CO2 SERPL-SCNC: 29 MMOL/L (ref 21–32)
CREAT SERPL-MCNC: 0.7 MG/DL (ref 0.6–1.3)
ERYTHROCYTE [DISTWIDTH] IN BLOOD BY AUTOMATED COUNT: 12.8 % (ref 11.6–15.1)
GFR SERPL CREATININE-BSD FRML MDRD: 94 ML/MIN/1.73SQ M
GLUCOSE SERPL-MCNC: 100 MG/DL (ref 65–140)
GLUCOSE SERPL-MCNC: 105 MG/DL (ref 65–140)
GLUCOSE SERPL-MCNC: 122 MG/DL (ref 65–140)
GLUCOSE SERPL-MCNC: 123 MG/DL (ref 65–140)
GLUCOSE SERPL-MCNC: 99 MG/DL (ref 65–140)
HCT VFR BLD AUTO: 47.3 % (ref 34.8–46.1)
HGB BLD-MCNC: 14.8 G/DL (ref 11.5–15.4)
INR PPP: 1.42 (ref 0.84–1.19)
MCH RBC QN AUTO: 31.8 PG (ref 26.8–34.3)
MCHC RBC AUTO-ENTMCNC: 31.3 G/DL (ref 31.4–37.4)
MCV RBC AUTO: 102 FL (ref 82–98)
PLATELET # BLD AUTO: 197 THOUSANDS/UL (ref 149–390)
PMV BLD AUTO: 10.4 FL (ref 8.9–12.7)
POTASSIUM SERPL-SCNC: 4.2 MMOL/L (ref 3.5–5.3)
PROTHROMBIN TIME: 16.9 SECONDS (ref 11.6–14.5)
RBC # BLD AUTO: 4.66 MILLION/UL (ref 3.81–5.12)
SODIUM SERPL-SCNC: 136 MMOL/L (ref 136–145)
WBC # BLD AUTO: 9.44 THOUSAND/UL (ref 4.31–10.16)

## 2020-02-02 PROCEDURE — 85027 COMPLETE CBC AUTOMATED: CPT | Performed by: NURSE PRACTITIONER

## 2020-02-02 PROCEDURE — 99232 SBSQ HOSP IP/OBS MODERATE 35: CPT | Performed by: PHYSICIAN ASSISTANT

## 2020-02-02 PROCEDURE — 82948 REAGENT STRIP/BLOOD GLUCOSE: CPT

## 2020-02-02 PROCEDURE — 80048 BASIC METABOLIC PNL TOTAL CA: CPT | Performed by: NURSE PRACTITIONER

## 2020-02-02 PROCEDURE — 85610 PROTHROMBIN TIME: CPT | Performed by: PHYSICIAN ASSISTANT

## 2020-02-02 PROCEDURE — 85730 THROMBOPLASTIN TIME PARTIAL: CPT | Performed by: INTERNAL MEDICINE

## 2020-02-02 RX ORDER — WARFARIN SODIUM 7.5 MG/1
15 TABLET ORAL
Status: DISCONTINUED | OUTPATIENT
Start: 2020-02-02 | End: 2020-02-06

## 2020-02-02 RX ADMIN — Medication 1 TABLET: at 08:39

## 2020-02-02 RX ADMIN — DOCUSATE SODIUM 100 MG: 100 CAPSULE, LIQUID FILLED ORAL at 17:27

## 2020-02-02 RX ADMIN — Medication 50 MG: at 08:42

## 2020-02-02 RX ADMIN — QUETIAPINE FUMARATE 25 MG: 25 TABLET ORAL at 21:55

## 2020-02-02 RX ADMIN — DOCUSATE SODIUM 100 MG: 100 CAPSULE, LIQUID FILLED ORAL at 08:39

## 2020-02-02 RX ADMIN — WARFARIN SODIUM 15 MG: 7.5 TABLET ORAL at 17:27

## 2020-02-02 RX ADMIN — HEPARIN SODIUM AND DEXTROSE 14 UNITS/KG/HR: 10000; 5 INJECTION INTRAVENOUS at 14:02

## 2020-02-02 RX ADMIN — CALCIUM 1 TABLET: 500 TABLET ORAL at 08:42

## 2020-02-02 RX ADMIN — OXYCODONE HYDROCHLORIDE AND ACETAMINOPHEN 500 MG: 500 TABLET ORAL at 08:39

## 2020-02-02 NOTE — SOCIAL WORK
Pt discussed with Albert Kearney from [facility] pt is not medically cleared for discharge  INR not therapeutic  Cm to follow

## 2020-02-02 NOTE — ASSESSMENT & PLAN NOTE
· Likely 2/2 PE  · Cardiology and pulmonary following  Repeat echo LVEF 65%, G1DD, with moderately dilated right ventricle with low-normal systolic function

## 2020-02-02 NOTE — ASSESSMENT & PLAN NOTE
· Secondary to recent foot surgery and immobility  · S/p right and left EKOS catheter placement with continuous TPA and heparin infusion  Catheters removed  · Pulmonary following  Patient on heparin drip bridging to coumadin  · INR still not therapeutic, 1 42 today   Will increase coumadin to 15mg tonight  · Labs in am   · Patient to go to rehab once INR therapeutic

## 2020-02-02 NOTE — PLAN OF CARE
Problem: Potential for Falls  Goal: Patient will remain free of falls  Description  INTERVENTIONS:  - Assess patient frequently for physical needs  -  Identify cognitive and physical deficits and behaviors that affect risk of falls    -  [place] fall precautions as indicated by assessment   - Educate patient/family on patient safety including physical limitations  - Instruct patient to call for assistance with activity based on assessment  - Modify environment to reduce risk of injury  - Consider OT/PT consult to assist with strengthening/mobility  Outcome: Progressing

## 2020-02-02 NOTE — PROGRESS NOTES
Progress Note - Shruthi Greene 1960, 62 y o  female MRN: [id number]  Unit/Bed#: -01 Encounter: [id number] DOS: 2/2/2020  Primary Care Provider: Winnie Chambers MD   Date and time admitted to hospital: 1/22/2020 10:22 PM    * Acute pulmonary embolism with acute cor pulmonale ([facility] [address] )  Assessment & Plan  · Secondary to recent foot surgery and immobility  · S/p right and left EKOS catheter placement with continuous TPA and heparin infusion  Catheters removed  · Pulmonary following  Patient on heparin drip bridging to coumadin  · INR still not therapeutic, 1 42 today  Will increase coumadin to 15mg tonight  · Labs in am   · Patient to go to rehab once INR therapeutic    Type 2 diabetes mellitus without complication, without long-term current use of insulin [facility])  Assessment & Plan  Lab Results   Component Value Date    HGBA1C 6 7 (H) 01/21/2020     Recent Labs     02/01/20  1148 02/01/20  1615 02/01/20  2108 02/02/20  0658   POCGLU 105 109 130 122     Blood Sugar Average: Last 72 hrs:  (P) 115 [id number]   · Hold metformin  · ISS    DVT (deep venous thrombosis) (HCC)  Assessment & Plan  · On heparin drip bridging to coumadin  Acute respiratory failure with hypoxia (HCC)  Assessment & Plan  · 2/2 PE  · Now on room air    Pulmonary hypertension ([facility] )  Assessment & Plan  · Likely 2/2 PE  · Cardiology and pulmonary following  Repeat echo LVEF 65%, G1DD, with moderately dilated right ventricle with low-normal systolic function  Morbid obesity with BMI of 50 0-59 9, adult [facility])  Assessment & Plan  · Secondary to excesses calorie intake  VTE Pharmacologic Prophylaxis:   Pharmacologic: Heparin Drip bridge to coumadin   Mechanical VTE Prophylaxis in Place: Yes    Patient Centered Rounds: I have performed bedside rounds with nursing staff today      Discussions with Specialists or Other Care Team Provider: nursing     Education and Discussions with Family / Patient: patient     Time Spent for Care: 30 minutes  More than 50% of total time spent on counseling and coordination of care as described above  Current Length of Stay: 11 day(s)    Current Patient Status: Inpatient   Certification Statement: The patient will continue to require additional inpatient hospital stay due to pending tx INR     Discharge Plan: to [facility] once INR >2     Code Status: Level 1 - Full Code      Subjective:   Pt seen and examined at bedside  Denies SOB or chest pain  States she needs FMLA paperwork signed  Objective:     Vitals:   Temp (24hrs), Av 9 °F (36 6 °C), Min:97 5 °F (36 4 °C), Max:98 3 °F (36 8 °C)    Temp:  [97 5 °F (36 4 °C)-98 3 °F (36 8 °C)] 97 8 °F (36 6 °C)  HR:  [86-88] 86  Resp:  [18-20] 18  BP: (115-153)/(62-83) 115/62  SpO2:  [92 %-93 %] 92 %  Body mass index is 51 29 kg/m²  Input and Output Summary (last 24 hours): Intake/Output Summary (Last 24 hours) at 2020 0939  Last data filed at 2020 9861  Gross per 24 hour   Intake 1140 ml   Output 0 ml   Net 1140 ml       Physical Exam:     Physical Exam   Constitutional: She is oriented to person, place, and time  She appears well-developed and well-nourished  No distress  HENT:   Head: Normocephalic and atraumatic  Eyes: EOM are normal  No scleral icterus  Neck: Normal range of motion  Neck supple  Cardiovascular: Normal rate, regular rhythm and normal heart sounds  No murmur heard  Pulmonary/Chest: Effort normal and breath sounds normal    Abdominal: Soft  Bowel sounds are normal    Morbid obesity    Musculoskeletal: Normal range of motion  Neurological: She is alert and oriented to person, place, and time  Skin: Skin is warm and dry  eccymoses    Psychiatric: She has a normal mood and affect       Additional Data:     Labs:    Results from last 7 days   Lab Units 20  0451   WBC Thousand/uL 9 44   HEMOGLOBIN g/dL 14 8   HEMATOCRIT % 47 3*   PLATELETS Thousands/uL 197     Results from last 7 days Lab Units 02/02/20  0451   SODIUM mmol/L 136   POTASSIUM mmol/L 4 2   CHLORIDE mmol/L 103   CO2 mmol/L 29   BUN mg/dL 15   CREATININE mg/dL 0 70   ANION GAP mmol/L 4   CALCIUM mg/dL 9 3   GLUCOSE RANDOM mg/dL 123     Results from last 7 days   Lab Units 02/02/20  0451   INR  1 42*     Results from last 7 days   Lab Units 02/02/20  0658 02/01/20  2108 02/01/20  1615 02/01/20  1148 02/01/20  0616 01/31/20  2103 01/31/20  1543 01/31/20  1151 01/31/20  0712 01/30/20  2103 01/30/20  1556 01/30/20  1215   POC GLUCOSE mg/dl 122 130 109 105 136 124 107 83 101 141* 138 109               * I Have Reviewed All Lab Data Listed Above  * Additional Pertinent Lab Tests Reviewed:  [place] Admission Reviewed    Imaging:    Imaging Reports Reviewed Today Include: all  Imaging Personally Reviewed by Myself Includes:  none    Recent Cultures (last 7 days):           Last 24 Hours Medication List:     Current Facility-Administered Medications:  acetaminophen 650 mg Oral Q6H PRN Aide Magdy, CRNP    ascorbic acid 500 mg Oral Daily Aide Magdy, CRNP    calcium carbonate 1 tablet Oral Daily With Breakfast Meredith Santo, CRNP    docusate sodium 100 mg Oral BID Dacia Cobos MD    heparin (porcine) 3-30 Units/kg/hr (Order-Specific) Intravenous Titrated Aide Magdy, CRNP Last Rate: 14 Units/kg/hr (02/02/20 4074)   heparin (porcine) 10,000 Units Intravenous PRN Aide Magdy, CRNP    heparin (porcine) 5,000 Units Intravenous PRN Aide Magdy, CRNP    hydrochlorothiazide 50 mg Oral Daily Meredith Santo, CRNP    insulin lispro 2-12 Units Subcutaneous TID AC Aide Magdy, CRNP    multivitamin-minerals 1 tablet Oral Daily Meredith Santo, CRNP    polyethylene glycol 17 g Oral Daily PRN Dacia Cobos MD    QUEtiapine 25 mg Oral HS Meredith Santo, CRNP    sodium chloride 2 spray Each Nare Q1H PRN Dacia Cobos MD    traMADol 50 mg Oral Q6H PRN Aide Magdy, CRNP    warfarin 15 mg Oral Daily (warfarin) Fazal Lama PA-C    zinc gluconate 50 mg Oral Daily JULISA Johnson         Today, Patient Was Seen By: Jake Perkins PA-C    ** Please Note: Dictation voice to text software may have been used in the creation of this document   **

## 2020-02-02 NOTE — ASSESSMENT & PLAN NOTE
Lab Results   Component Value Date    HGBA1C 6 7 (H) 01/21/2020     Recent Labs     02/01/20  1148 02/01/20  1615 02/01/20  2108 02/02/20  0658   POCGLU 105 109 130 122     Blood Sugar Average: Last 72 hrs:  (P) 115 [id number]   · Hold metformin  · ISS

## 2020-02-03 LAB
APTT PPP: 69 SECONDS (ref 23–37)
GLUCOSE SERPL-MCNC: 102 MG/DL (ref 65–140)
GLUCOSE SERPL-MCNC: 114 MG/DL (ref 65–140)
GLUCOSE SERPL-MCNC: 119 MG/DL (ref 65–140)
GLUCOSE SERPL-MCNC: 119 MG/DL (ref 65–140)
INR PPP: 1.48 (ref 0.84–1.19)
PROTHROMBIN TIME: 17.5 SECONDS (ref 11.6–14.5)

## 2020-02-03 PROCEDURE — 99232 SBSQ HOSP IP/OBS MODERATE 35: CPT | Performed by: PHYSICIAN ASSISTANT

## 2020-02-03 PROCEDURE — 82948 REAGENT STRIP/BLOOD GLUCOSE: CPT

## 2020-02-03 PROCEDURE — 85610 PROTHROMBIN TIME: CPT | Performed by: PHYSICIAN ASSISTANT

## 2020-02-03 PROCEDURE — 85730 THROMBOPLASTIN TIME PARTIAL: CPT | Performed by: INTERNAL MEDICINE

## 2020-02-03 RX ADMIN — QUETIAPINE FUMARATE 25 MG: 25 TABLET ORAL at 21:50

## 2020-02-03 RX ADMIN — HEPARIN SODIUM AND DEXTROSE 14 UNITS/KG/HR: 10000; 5 INJECTION INTRAVENOUS at 18:17

## 2020-02-03 RX ADMIN — DOCUSATE SODIUM 100 MG: 100 CAPSULE, LIQUID FILLED ORAL at 17:23

## 2020-02-03 RX ADMIN — DOCUSATE SODIUM 100 MG: 100 CAPSULE, LIQUID FILLED ORAL at 08:53

## 2020-02-03 RX ADMIN — WARFARIN SODIUM 15 MG: 7.5 TABLET ORAL at 17:23

## 2020-02-03 RX ADMIN — CALCIUM 1 TABLET: 500 TABLET ORAL at 08:54

## 2020-02-03 RX ADMIN — Medication 50 MG: at 08:54

## 2020-02-03 RX ADMIN — OXYCODONE HYDROCHLORIDE AND ACETAMINOPHEN 500 MG: 500 TABLET ORAL at 08:53

## 2020-02-03 RX ADMIN — Medication 1 TABLET: at 08:53

## 2020-02-03 RX ADMIN — HEPARIN SODIUM AND DEXTROSE 14 UNITS/KG/HR: 10000; 5 INJECTION INTRAVENOUS at 03:24

## 2020-02-03 NOTE — PLAN OF CARE
Problem: Potential for Falls  Goal: Patient will remain free of falls  Description  INTERVENTIONS:  - Assess patient frequently for physical needs  -  Identify cognitive and physical deficits and behaviors that affect risk of falls    -  [place] fall precautions as indicated by assessment   - Educate patient/family on patient safety including physical limitations  - Instruct patient to call for assistance with activity based on assessment  - Modify environment to reduce risk of injury  - Consider OT/PT consult to assist with strengthening/mobility  Outcome: Progressing

## 2020-02-03 NOTE — PROGRESS NOTES
Progress Note - Anand Conte 1960, 62 y o  female MRN: [id number]    Unit/Bed#: -01 Encounter: [id number]    Primary Care Provider: David Aguilar MD   Date and time admitted to hospital: 1/22/2020 10:22 PM        * Acute pulmonary embolism with acute cor pulmonale ([facility] [address] )  Assessment & Plan  · Secondary to recent foot surgery and immobility  · S/p right and left EKOS catheter placement with continuous TPA and heparin infusion  Catheters removed  · Pulmonary following  Patient on heparin drip bridging to coumadin  · INR still not therapeutic, 1 48 today  coumadin to 15mg tonight  · Labs in am   · Patient to go to rehab once INR therapeutic    DVT (deep venous thrombosis) ([facility])  Assessment & Plan  · On heparin drip bridging to coumadin  Status post left foot surgery  Assessment & Plan  · Patient has left boot  Podiatry evaluated and patient for rehab placement once INR therapeutic  · Surgery mid December 16th    Acute respiratory failure with hypoxia ([facility] [address] )  Assessment & Plan  · 2/2 PE  · Now on room air    Morbid obesity with BMI of 50 0-59 9, adult [facility])  Assessment & Plan  · Secondary to excesses calorie intake  Type 2 diabetes mellitus without complication, without long-term current use of insulin [facility])  Assessment & Plan  Lab Results   Component Value Date    HGBA1C 6 7 (H) 01/21/2020     Recent Labs     02/02/20  1653 02/02/20  2132 02/03/20  0635 02/03/20  1109   POCGLU 100 105 119 102     Blood Sugar Average: Last 72 hrs:  (P) 110 [id number]   · Hold metformin  · ISS    Pulmonary hypertension ([facility] )  Assessment & Plan  · Likely 2/2 PE  · Cardiology and pulmonary following  Repeat echo LVEF 65%, G1DD, with moderately dilated right ventricle with low-normal systolic function        VTE Pharmacologic Prophylaxis:   Pharmacologic: Warfarin (Coumadin)  Mechanical VTE Prophylaxis in Place: Yes    Patient Centered Rounds: I have performed bedside rounds with nursing staff today     Discussions with Specialists or Other Care Team Provider: case management    Education and Discussions with Family / Patient: patient    Time Spent for Care: 30 minutes  More than 50% of total time spent on counseling and coordination of care as described above  Current Length of Stay: 12 day(s)    Current Patient Status: Inpatient   Certification Statement: The patient will continue to require additional inpatient hospital stay due to IV heparin to Coumadin bridge    Discharge Plan: STR when INR therapeutic    Code Status: Level 1 - Full Code      Subjective:   Offers no complaints  Objective:     Vitals:   Temp (24hrs), Av 8 °F (36 6 °C), Min:97 5 °F (36 4 °C), Max:98 °F (36 7 °C)    Temp:  [97 5 °F (36 4 °C)-98 °F (36 7 °C)] 97 8 °F (36 6 °C)  HR:  [85-90] 85  Resp:  [18-20] 18  BP: (103-143)/(58-69) 103/58  SpO2:  [91 %-96 %] 96 %  Body mass index is 51 29 kg/m²  Input and Output Summary (last 24 hours): Intake/Output Summary (Last 24 hours) at 2/3/2020 1235  Last data filed at 2/3/2020 0900  Gross per 24 hour   Intake 1436 63 ml   Output 200 ml   Net 1236 63 ml       Physical Exam:     Physical Exam   Constitutional: She is oriented to person, place, and time  She appears well-developed and well-nourished  No distress  HENT:   Head: Normocephalic and atraumatic  Cardiovascular: Normal rate and regular rhythm  Exam reveals no friction rub  No murmur heard  Pulmonary/Chest: Effort normal and breath sounds normal  No respiratory distress  She has no wheezes  Abdominal: Soft  Bowel sounds are normal  She exhibits no distension  There is no tenderness  There is no rebound and no guarding  Musculoskeletal: She exhibits no edema  Neurological: She is alert and oriented to person, place, and time  No cranial nerve deficit  Skin: Skin is warm and dry  No rash noted  Psychiatric: She has a normal mood and affect  Nursing note and vitals reviewed        Additional Data: Labs:    Results from last 7 days   Lab Units 02/02/20  0451   WBC Thousand/uL 9 44   HEMOGLOBIN g/dL 14 8   HEMATOCRIT % 47 3*   PLATELETS Thousands/uL 197     Results from last 7 days   Lab Units 02/02/20  0451   SODIUM mmol/L 136   POTASSIUM mmol/L 4 2   CHLORIDE mmol/L 103   CO2 mmol/L 29   BUN mg/dL 15   CREATININE mg/dL 0 70   ANION GAP mmol/L 4   CALCIUM mg/dL 9 3   GLUCOSE RANDOM mg/dL 123     Results from last 7 days   Lab Units 02/03/20  0503   INR  1 48*     Results from last 7 days   Lab Units 02/03/20  1109 02/03/20  0635 02/02/20  2132 02/02/20  1653 02/02/20  1113 02/02/20  0658 02/01/20  2108 02/01/20  1615 02/01/20  1148 02/01/20  0616 01/31/20  2103 01/31/20  1543   POC GLUCOSE mg/dl 102 119 105 100 99 122 130 109 105 136 124 107                   * I Have Reviewed All Lab Data Listed Above  * Additional Pertinent Lab Tests Reviewed:  All Labs Within Last 24 Hours Reviewed    Imaging:    Imaging Reports Reviewed Today Include: none  Imaging Personally Reviewed by Myself Includes:  none    Recent Cultures (last 7 days):           Last 24 Hours Medication List:     Current Facility-Administered Medications:  acetaminophen 650 mg Oral Q6H PRN Shasta Mcburney, CRNP    ascorbic acid 500 mg Oral Daily Shasta Mcburney, CRNP    calcium carbonate 1 tablet Oral Daily With Breakfast Meredith Santo, CRNP    docusate sodium 100 mg Oral BID Hallie Toro MD    heparin (porcine) 3-30 Units/kg/hr (Order-Specific) Intravenous Titrated Shasta Mcburney, CRNP Last Rate: 14 Units/kg/hr (02/03/20 0324)   heparin (porcine) 10,000 Units Intravenous PRN Kathya Mcburney, CRNP    heparin (porcine) 5,000 Units Intravenous PRN Kathya Mcburney, CRNP    hydrochlorothiazide 50 mg Oral Daily Meredith Santo, CRNP    insulin lispro 2-12 Units Subcutaneous TID AC Shasta Mcburney, CRNP    multivitamin-minerals 1 tablet Oral Daily Meredith Santo, CRNP    polyethylene glycol 17 g Oral Daily PRN Hallie Toro MD    QUEtiapine 25 mg Oral HS Shasta Mcburney, JULISA    sodium chloride 2 spray Each Nare Q1H PRN Efrem Gomez MD    traMADol 50 mg Oral Q6H PRN JULISA Carrera    warfarin 15 mg Oral Daily (warfarin) Agustín Lama PA-C    zinc gluconate 50 mg Oral Daily JULISA Carrera         Today, Patient Was Seen By: Андрей Wilson PA-C    ** Please Note: Dictation voice to text software may have been used in the creation of this document   **

## 2020-02-03 NOTE — ASSESSMENT & PLAN NOTE
Lab Results   Component Value Date    HGBA1C 6 7 (H) 01/21/2020     Recent Labs     02/02/20  1653 02/02/20  2132 02/03/20  0635 02/03/20  1109   POCGLU 100 105 119 102     Blood Sugar Average: Last 72 hrs:  (P) 110 [id number]   · Hold metformin  · ISS

## 2020-02-03 NOTE — SOCIAL WORK
Cm reviewed patient's chart  Cm informed awaiting therapeutic INR  Cm provided update to following facility, [facility]  Cm awaiting medical clearance and will need insurance authorization prior to dc  Cm informed by medical provider that patient is on a herapin drip and they wanted CM to inquire if rehab facility would be able to accept with drip  Cm placed question to facility and awaiting response

## 2020-02-03 NOTE — ASSESSMENT & PLAN NOTE
· Secondary to recent foot surgery and immobility  · S/p right and left EKOS catheter placement with continuous TPA and heparin infusion  Catheters removed  · Pulmonary following  Patient on heparin drip bridging to coumadin  · INR still not therapeutic, 1 48 today   coumadin to 15mg tonight  · Labs in am   · Patient to go to rehab once INR therapeutic

## 2020-02-03 NOTE — UTILIZATION REVIEW
Continued Stay Review    Date: 2020                        Current Patient Class: Inpatient  Current Level of Care: Med/Surg    HPI:59 y o  female initially admitted on 2020      Assessment/Plan: Patient on heparin drip bridging to coumadin  INR still not therapeutic, 1 42 yesterday and 1 38 today   Will increase coumadin to 12mg tonight  Labs in am       Pertinent Labs/Diagnostic Results:   Results from last 7 days   Lab Units 20  0518 20  0501 20  0512   WBC Thousand/uL 10 01 9 54 9 56   HEMOGLOBIN g/dL 14 6 14 7 14 9   HEMATOCRIT % 46 6* 46 2* 46 8*   PLATELETS Thousands/uL 185 170 158     Results from last 7 days   Lab Units 20  0512   SODIUM mmol/L 138   POTASSIUM mmol/L 4 8   CHLORIDE mmol/L 104   CO2 mmol/L 32   ANION GAP mmol/L 2*   BUN mg/dL 17   CREATININE mg/dL 0 71   EGFR ml/min/1 73sq m 92   CALCIUM mg/dL 9 3     Results from last 7 days   Lab Units 20  2108 20  1615 20  1148 20  0616 20  2103   POC GLUCOSE mg/dl 130 109 105 136 124     Results from last 7 days   Lab Units 20  0512   GLUCOSE RANDOM mg/dL 114     Results from last 7 days   Lab Units 20  1327   PROTIME seconds 16 0*   INR  1 32*   PTT seconds  --      Vital Signs:   Temp (24hrs), Av 2 °F (36 8 °C), Min:98 1 °F (36 7 °C), Max:98 3 °F (36 8 °C)     Temp:  [98 1 °F (36 7 °C)-98 3 °F (36 8 °C)] 98 3 °F (36 8 °C)  HR:  [79-83] 83  Resp:  [18] 18  BP: (126-145)/(67-79) 145/71  SpO2:  [92 %-95 %] 92 %  Body mass index is 51 29 kg/m²       Medications:   acetaminophen 650 mg Oral Q6H PRN   ascorbic acid 500 mg Oral Daily   calcium carbonate 1 tablet Oral Daily With Breakfast   docusate sodium 100 mg Oral BID   heparin (porcine) 3-30 Units/kg/hr (Order-Specific) Intravenous Titrated   heparin (porcine) 10,000 Units Intravenous PRN   heparin (porcine) 5,000 Units Intravenous PRN   hydrochlorothiazide 50 mg Oral Daily   insulin lispro 2-12 Units Subcutaneous TID AC multivitamin-minerals 1 tablet Oral Daily   polyethylene glycol 17 g Oral Daily PRN   QUEtiapine 25 mg Oral HS   sodium chloride 2 spray Each Nare Q1H PRN   traMADol 50 mg Oral Q6H PRN   warfarin 12 mg Oral Daily (warfarin)   zinc gluconate 50 mg Oral Daily     Discharge Plan: TBD - Patient to go to rehab once INR therapeutic  Network Utilization Review Department  [email or web address]  ATTENTION: Please call with any questions or concerns to [phone] and carefully listen to the prompts so that you are directed to the right person  All voicemails are confidential   Cinthya Hartman all requests for admission clinical reviews, approved or denied determinations and any other requests to dedicated fax number below belonging to the campus where the patient is receiving treatment   List of dedicated fax numbers for the Facilities:  [address] DENIALS (Administrative/Medical Necessity) [phone]   [address] (Maternity/NICU/Pediatrics) [phone]   Yaw Mortensen [phone]     [address]  [phone]   [address] [place] [address] [phone]   [address] [place] [address]  [phone]   [address] [phone]   [facility]  [phone]   [address] [facility]  [address] [facility] [address] [facility] [phone]

## 2020-02-04 LAB
APTT PPP: 70 SECONDS (ref 23–37)
GLUCOSE SERPL-MCNC: 108 MG/DL (ref 65–140)
GLUCOSE SERPL-MCNC: 126 MG/DL (ref 65–140)
GLUCOSE SERPL-MCNC: 128 MG/DL (ref 65–140)
GLUCOSE SERPL-MCNC: 155 MG/DL (ref 65–140)
INR PPP: 1.67 (ref 0.84–1.19)
PROTHROMBIN TIME: 19.2 SECONDS (ref 11.6–14.5)

## 2020-02-04 PROCEDURE — 99232 SBSQ HOSP IP/OBS MODERATE 35: CPT | Performed by: PHYSICIAN ASSISTANT

## 2020-02-04 PROCEDURE — 85610 PROTHROMBIN TIME: CPT | Performed by: INTERNAL MEDICINE

## 2020-02-04 PROCEDURE — 97530 THERAPEUTIC ACTIVITIES: CPT

## 2020-02-04 PROCEDURE — 82948 REAGENT STRIP/BLOOD GLUCOSE: CPT

## 2020-02-04 PROCEDURE — 97535 SELF CARE MNGMENT TRAINING: CPT

## 2020-02-04 PROCEDURE — 97116 GAIT TRAINING THERAPY: CPT

## 2020-02-04 PROCEDURE — 85730 THROMBOPLASTIN TIME PARTIAL: CPT | Performed by: INTERNAL MEDICINE

## 2020-02-04 RX ADMIN — QUETIAPINE FUMARATE 25 MG: 25 TABLET ORAL at 21:50

## 2020-02-04 RX ADMIN — CALCIUM 1 TABLET: 500 TABLET ORAL at 07:44

## 2020-02-04 RX ADMIN — WARFARIN SODIUM 15 MG: 7.5 TABLET ORAL at 17:37

## 2020-02-04 RX ADMIN — DOCUSATE SODIUM 100 MG: 100 CAPSULE, LIQUID FILLED ORAL at 08:02

## 2020-02-04 RX ADMIN — HYDROCHLOROTHIAZIDE 50 MG: 25 TABLET ORAL at 08:01

## 2020-02-04 RX ADMIN — OXYCODONE HYDROCHLORIDE AND ACETAMINOPHEN 500 MG: 500 TABLET ORAL at 08:02

## 2020-02-04 RX ADMIN — Medication 1 TABLET: at 08:01

## 2020-02-04 RX ADMIN — Medication 50 MG: at 08:01

## 2020-02-04 RX ADMIN — HEPARIN SODIUM AND DEXTROSE 11.2 UNITS/KG/HR: 10000; 5 INJECTION INTRAVENOUS at 22:56

## 2020-02-04 RX ADMIN — DOCUSATE SODIUM 100 MG: 100 CAPSULE, LIQUID FILLED ORAL at 17:36

## 2020-02-04 RX ADMIN — HEPARIN SODIUM AND DEXTROSE 14 UNITS/KG/HR: 10000; 5 INJECTION INTRAVENOUS at 07:43

## 2020-02-04 NOTE — ASSESSMENT & PLAN NOTE
· Secondary to recent foot surgery and immobility  · S/p right and left EKOS catheter placement with continuous TPA and heparin infusion  Catheters removed  · Pulmonary was following  · Patient on heparin drip bridging to coumadin  · INR still not therapeutic, 1 67 today   coumadin 15mg tonight  · Labs in am

## 2020-02-04 NOTE — PLAN OF CARE
Problem: PHYSICAL THERAPY ADULT  Goal: Performs mobility at highest level of function for planned discharge setting  See evaluation for individualized goals  Description  Treatment/Interventions: Functional transfer training, LE strengthening/ROM, Therapeutic exercise, Endurance training, Patient/family training, Equipment eval/education, Bed mobility, Gait training, Spoke to nursing  Equipment Recommended: Demetrius Reason       See flowsheet documentation for full assessment, interventions and recommendations  Outcome: Progressing  Note:   Prognosis: Good  Problem List: Decreased strength, Decreased range of motion, Decreased endurance, Impaired balance, Decreased mobility, Pain, Orthopedic restrictions  Assessment: The pt  was able to ambulate community distances today, but she continues to require extended time as well as rests  She required increased time to manuever around obstacles as well as to perform turns  She noted increasing pain as she ambulated, but she reported that it was only minimal  She had increasing gait deviations as she fatigued, but she recovered with rests  She remains limited from her baseline with deficits in strength, balance, and especially activity tolerance  She will benefit from continued physical therapy in order to maximize her functional mobility and independence  Barriers to Discharge: Decreased caregiver support  Barriers to Discharge Comments: Pt  lives alone  Recommendation: Post acute IP rehab     PT - OK to Discharge: Yes    See flowsheet documentation for full assessment

## 2020-02-04 NOTE — ASSESSMENT & PLAN NOTE
Lab Results   Component Value Date    HGBA1C 6 7 (H) 01/21/2020     Recent Labs     02/03/20  1610 02/03/20 2056 02/04/20  0654 02/04/20  1030   POCGLU 114 119 126 128     Blood Sugar Average: Last 72 hrs:  (P) 115 [id number]   · Hold metformin  · ISS

## 2020-02-04 NOTE — PLAN OF CARE
Problem: Potential for Falls  Goal: Patient will remain free of falls  Description  INTERVENTIONS:  - Assess patient frequently for physical needs  -  Identify cognitive and physical deficits and behaviors that affect risk of falls    -  [place] fall precautions as indicated by assessment   - Educate patient/family on patient safety including physical limitations  - Instruct patient to call for assistance with activity based on assessment  - Modify environment to reduce risk of injury  - Consider OT/PT consult to assist with strengthening/mobility  Outcome: Adequate for Discharge

## 2020-02-04 NOTE — ASSESSMENT & PLAN NOTE
· Patient has left boot  Podiatry evaluated and patient for rehab placement     · Surgery mid December 16th

## 2020-02-04 NOTE — PHYSICAL THERAPY NOTE
Physical Therapy Progress Note     02/04/20 1245   Pain Assessment   Pain Assessment 0-10   Pain Score 3   Pain Type Acute pain   Pain Location Foot   Pain Orientation Left   Hospital Pain Intervention(s) Repositioned; Ambulation/increased activity; Emotional support   Response to Interventions Tolerated  Restrictions/Precautions   LLE Weight Bearing Per Order WBAT  (LLE )   Braces or Orthoses CAM Boot  (LLE)   Other Precautions WBS;Pain; Fall Risk;Multiple lines   Subjective   Subjective The pt  states that she is feeling better, but she notes that she continues to feel weak  Transfers   Sit to Stand 5  Supervision   Additional items Increased time required   Stand to Sit 5  Supervision   Additional items Increased time required   Ambulation/Elevation   Gait pattern Excessively slow; Short stride; Inconsistent rhoda;Decreased foot clearance   Gait Assistance 5  Supervision   Additional items Verbal cues   Assistive Device Bariatric Rolling walker   Distance 150 feet x 2  Balance   Static Sitting Good   Dynamic Sitting Fair +   Static Standing Fair   Ambulatory Fair -   Activity Tolerance   Activity Tolerance Patient tolerated treatment well;Patient limited by fatigue   Nurse Made Aware Tye Talbot RN  Assessment   Prognosis Good   Problem List Decreased strength;Decreased range of motion;Decreased endurance; Impaired balance;Decreased mobility;Pain;Orthopedic restrictions   Assessment The pt  was able to ambulate community distances today, but she continues to require extended time as well as rests  She required increased time to manuever around obstacles as well as to perform turns  She noted increasing pain as she ambulated, but she reported that it was only minimal  She had increasing gait deviations as she fatigued, but she recovered with rests  She remains limited from her baseline with deficits in strength, balance, and especially activity tolerance   She will benefit from continued physical therapy in order to maximize her functional mobility and independence  Barriers to Discharge Decreased caregiver support   Barriers to Discharge Comments Pt  lives alone  Goals   Patient Goals To regain her strength and endurance  STG Expiration Date 02/09/20   PT Treatment Day 4   Plan   Treatment/Interventions Functional transfer training;LE strengthening/ROM; Elevations; Therapeutic exercise; Endurance training;Patient/family training;Bed mobility;Gait training   Progress Progressing toward goals   PT Frequency   (3-5x a week )   Recommendation   Recommendation Post acute IP rehab   Equipment Recommended Walker  (Bariatric walker )     Priyank Calhoun, PTA

## 2020-02-04 NOTE — SOCIAL WORK
Cm reviewed patient with medical provider  Patient is cleared to dc today  Cm confirmed that accepting facility, [facility], will have a bed and will accept patient with a heparin drip  Cm contacted insurance company, spoke with [employer]  And started insurance authorization process  Cm informed that pending reference number is [id number]  Robby awaiting updated PT/OT notes to be able to fax to [employer], [phone]  Cm obtained therapy notes and faxed clinical packet to insurance company

## 2020-02-04 NOTE — OCCUPATIONAL THERAPY NOTE
Occupational Therapy Treatment Note:       02/04/20 1252   Restrictions/Precautions   Weight Bearing Precautions Per Order Yes   LLE Weight Bearing Per Order WBAT   Braces or Orthoses CAM Boot  (LLE)   Other Precautions WBS; Multiple lines; Fall Risk;Pain   Pain Assessment   Pain Assessment 0-10   Pain Score No Pain  ("not really pain")   Pain Type Acute pain   Pain Location Foot   Pain Orientation Left   ADL   Where Assessed Chair   Grooming Assistance 5  Supervision/Setup   Grooming Deficit Setup;Verbal cueing   UB Bathing Assistance 4  Minimal Assistance   UB Bathing Deficit Setup;Verbal cueing; Increased time to complete   LB Bathing Assistance 4  Minimal Assistance   LB Bathing Deficit Setup;Steadying;Verbal cueing; Increased time to complete   UB Dressing Assistance 5  Supervision/Setup   UB Dressing Deficit Setup   LB Dressing Assistance 4  Minimal Assistance   LB Dressing Deficit Setup;Verbal cueing;Supervision/safety; Increased time to complete   Toileting Assistance  4  Minimal Assistance   Toileting Deficit Setup;Supervison/safety; Increased time to complete   Toileting Comments   (has IV line/pole)   Transfers   Sit to Stand 5  Supervision   Additional items Assist x 1; Armrests; Increased time required;Verbal cues   Stand to Sit 5  Supervision   Additional items Assist x 1; Armrests; Increased time required;Verbal cues   Stand pivot 4  Minimal assistance   Additional items Assist x 1; Increased time required;Verbal cues  (mostly CGA)   Functional Mobility   Functional Mobility 5  Supervision   Additional items Rolling walker   Cognition   Overall Cognitive Status WFL   Arousal/Participation Alert; Cooperative   Attention Within functional limits   Orientation Level Oriented X4   Memory Within functional limits   Following Commands Follows all commands and directions without difficulty   Comments pleasant and motivated   Activity Tolerance   Activity Tolerance Patient tolerated treatment well   Medical Staff Made Aware patient cleared for OT by RN   Assessment   Assessment Patient presents seated in recliner agreeable to engage in OT  Patient identified by name and  and oriented x4  Patient able to carryover with use of adaptive techniques with min vc's  Patient with IV line/pole attached which she reports managing at times independently  Patient provided with CGA  and supervision with standing and dynamic functional balance tasks  Continue to recommend Short Term Rehab when medically cleared for discharge  Plan   Treatment Interventions ADL retraining;Functional transfer training; Endurance training   Goal Expiration Date 20   OT Treatment Day 3   OT Frequency 3-5x/wk   Recommendation   OT Discharge Recommendation Short Term Rehab   OT - OK to Discharge   (when medically cleared)   Prudence Puna

## 2020-02-04 NOTE — PLAN OF CARE
Problem: OCCUPATIONAL THERAPY ADULT  Goal: Performs self-care activities at highest level of function for planned discharge setting  See evaluation for individualized goals  Description  Treatment Interventions: ADL retraining, Functional transfer training, UE strengthening/ROM, Endurance training, Patient/family training, Equipment evaluation/education, Compensatory technique education, Continued evaluation, Energy conservation, Activityengagement          See flowsheet documentation for full assessment, interventions and recommendations  Outcome: Progressing  Note:   Limitation: Decreased ADL status, Decreased endurance, Decreased high-level ADLs  Prognosis: Good  Assessment: Patient presents seated in recliner agreeable to engage in OT  Patient identified by name and  and oriented x4  Patient able to carryover with use of adaptive techniques with min vc's  Patient with IV line/pole attached which she reports managing at times independently  Patient provided with CGA  and supervision with standing and dynamic functional balance tasks  Continue to recommend Short Term Rehab when medically cleared for discharge        OT Discharge Recommendation: Short Term Rehab  OT - OK to Discharge: (when medically cleared)  Navneet Paulson

## 2020-02-04 NOTE — PROGRESS NOTES
Progress Note - [facility] 1960, 62 y o  female MRN: [id number]    Unit/Bed#: -01 Encounter: [id number]    Primary Care Provider: Elle Johnson MD   Date and time admitted to hospital: 1/22/2020 10:22 PM        * Acute pulmonary embolism with acute cor pulmonale ([facility] [address] )  Assessment & Plan  · Secondary to recent foot surgery and immobility  · S/p right and left EKOS catheter placement with continuous TPA and heparin infusion  Catheters removed  · Pulmonary was following  · Patient on heparin drip bridging to coumadin  · INR still not therapeutic, 1 67 today  coumadin 15mg tonight  · Labs in am     DVT (deep venous thrombosis) ([facility])  Assessment & Plan  · On heparin drip bridging to coumadin  Status post left foot surgery  Assessment & Plan  · Patient has left boot  Podiatry evaluated and patient for rehab placement   · Surgery mid December 16th    Acute respiratory failure with hypoxia ([facility] [address] )  Assessment & Plan  · 2/2 PE  · Now on room air    Morbid obesity with BMI of 50 0-59 9, adult [facility])  Assessment & Plan  · Secondary to excesses calorie intake  Type 2 diabetes mellitus without complication, without long-term current use of insulin [facility])  Assessment & Plan  Lab Results   Component Value Date    HGBA1C 6 7 (H) 01/21/2020     Recent Labs     02/03/20  1610 02/03/20  2056 02/04/20  0654 02/04/20  1030   POCGLU 114 119 126 128     Blood Sugar Average: Last 72 hrs:  (P) 115 [id number]   · Hold metformin  · ISS    Pulmonary hypertension ([facility] [address] )  Assessment & Plan  · Likely 2/2 PE  · Cardiology and pulmonary were following  Repeat echo LVEF 65%, G1DD, with moderately dilated right ventricle with low-normal systolic function  VTE Pharmacologic Prophylaxis:   Pharmacologic: Heparin Drip  Mechanical VTE Prophylaxis in Place: Yes    Patient Centered Rounds: I have performed bedside rounds with nursing staff today      Discussions with Specialists or Other Care Team Provider: case management    Education and Discussions with Family / Patient: patient    Time Spent for Care: 30 minutes  More than 50% of total time spent on counseling and coordination of care as described above  Current Length of Stay: 13 day(s)    Current Patient Status: Inpatient   Certification Statement: The patient will continue to require additional inpatient hospital stay due to rehab placement    Discharge Plan: CM spoke with facility and they are able to accommodate her heparin drip  Currently awaiting insurance authorization  Code Status: Level 1 - Full Code      Subjective:   Offers no complaints  Objective:     Vitals:   Temp (24hrs), Av 8 °F (36 6 °C), Min:97 5 °F (36 4 °C), Max:98 2 °F (36 8 °C)    Temp:  [97 5 °F (36 4 °C)-98 2 °F (36 8 °C)] 97 7 °F (36 5 °C)  HR:  [82-96] 82  Resp:  [18-20] 20  BP: (134-141)/(63-82) 141/82  SpO2:  [91 %-93 %] 92 %  Body mass index is 51 29 kg/m²  Input and Output Summary (last 24 hours): Intake/Output Summary (Last 24 hours) at 2020 1243  Last data filed at 2020 1227  Gross per 24 hour   Intake 1570 ml   Output    Net 1570 ml       Physical Exam:     Physical Exam   Constitutional: She is oriented to person, place, and time  She appears well-developed and well-nourished  No distress  HENT:   Head: Normocephalic and atraumatic  Cardiovascular: Normal rate and regular rhythm  Exam reveals no friction rub  No murmur heard  Pulmonary/Chest: Effort normal and breath sounds normal  No respiratory distress  She has no wheezes  Abdominal: Soft  Bowel sounds are normal  She exhibits no distension  There is no tenderness  There is no rebound and no guarding  Musculoskeletal: She exhibits no edema  Neurological: She is alert and oriented to person, place, and time  No cranial nerve deficit  Skin: Skin is warm and dry  No rash noted  Left surgical boot in place   Psychiatric: She has a normal mood and affect     Nursing note and vitals reviewed  Additional Data:     Labs:    Results from last 7 days   Lab Units 02/02/20  0451   WBC Thousand/uL 9 44   HEMOGLOBIN g/dL 14 8   HEMATOCRIT % 47 3*   PLATELETS Thousands/uL 197     Results from last 7 days   Lab Units 02/02/20  0451   SODIUM mmol/L 136   POTASSIUM mmol/L 4 2   CHLORIDE mmol/L 103   CO2 mmol/L 29   BUN mg/dL 15   CREATININE mg/dL 0 70   ANION GAP mmol/L 4   CALCIUM mg/dL 9 3   GLUCOSE RANDOM mg/dL 123     Results from last 7 days   Lab Units 02/04/20  0448   INR  1 67*     Results from last 7 days   Lab Units 02/04/20  1030 02/04/20  0654 02/03/20  2056 02/03/20  1610 02/03/20  1109 02/03/20  0635 02/02/20  2132 02/02/20  1653 02/02/20  1113 02/02/20  0658 02/01/20  2108 02/01/20  1615   POC GLUCOSE mg/dl 128 126 119 114 102 119 105 100 99 122 130 109                   * I Have Reviewed All Lab Data Listed Above  * Additional Pertinent Lab Tests Reviewed:  All Labs Within Last 24 Hours Reviewed    Imaging:    Imaging Reports Reviewed Today Include: none  Imaging Personally Reviewed by Myself Includes:  none    Recent Cultures (last 7 days):           Last 24 Hours Medication List:     Current Facility-Administered Medications:  acetaminophen 650 mg Oral Q6H PRN Rodena Stephanie, CRNP    ascorbic acid 500 mg Oral Daily Rodena Stephanie, CRNP    calcium carbonate 1 tablet Oral Daily With Breakfast Meredith Santo, CRNP    docusate sodium 100 mg Oral BID Shawnee Day MD    heparin (porcine) 3-30 Units/kg/hr (Order-Specific) Intravenous Titrated Rodena Stephanie, CRNP Last Rate: 14 Units/kg/hr (02/04/20 0743)   heparin (porcine) 10,000 Units Intravenous PRN Rodena Stephanie, CRNP    heparin (porcine) 5,000 Units Intravenous PRN Rodena Stephanie, CRNP    hydrochlorothiazide 50 mg Oral Daily Meredith Santo, CRNP    insulin lispro 2-12 Units Subcutaneous TID AC Rodena Stephanie, CRNP    multivitamin-minerals 1 tablet Oral Daily Meredith Santo, CRNP    polyethylene glycol 17 g Oral Daily PRN Shawnee Day MD    QUEtiapine 25 mg Oral HS JULISA Hensley    sodium chloride 2 spray Each Nare Q1H PRN Jose Turner MD    traMADol 50 mg Oral Q6H PRN JULISA Saini    warfarin 15 mg Oral Daily (warfarin) Agustín Lama PA-C    zinc gluconate 50 mg Oral Daily JULISA Saini         Today, Patient Was Seen By: Maria Esther Choi PA-C    ** Please Note: Dictation voice to text software may have been used in the creation of this document   **

## 2020-02-04 NOTE — ASSESSMENT & PLAN NOTE
· Likely 2/2 PE  · Cardiology and pulmonary were following  Repeat echo LVEF 65%, G1DD, with moderately dilated right ventricle with low-normal systolic function

## 2020-02-05 LAB
APTT PPP: 77 SECONDS (ref 23–37)
GLUCOSE SERPL-MCNC: 122 MG/DL (ref 65–140)
GLUCOSE SERPL-MCNC: 141 MG/DL (ref 65–140)
GLUCOSE SERPL-MCNC: 161 MG/DL (ref 65–140)
GLUCOSE SERPL-MCNC: 98 MG/DL (ref 65–140)
INR PPP: 1.82 (ref 0.84–1.19)
PROTHROMBIN TIME: 20.6 SECONDS (ref 11.6–14.5)

## 2020-02-05 PROCEDURE — 97530 THERAPEUTIC ACTIVITIES: CPT

## 2020-02-05 PROCEDURE — 82948 REAGENT STRIP/BLOOD GLUCOSE: CPT

## 2020-02-05 PROCEDURE — 97116 GAIT TRAINING THERAPY: CPT

## 2020-02-05 PROCEDURE — 99232 SBSQ HOSP IP/OBS MODERATE 35: CPT | Performed by: PHYSICIAN ASSISTANT

## 2020-02-05 PROCEDURE — 85610 PROTHROMBIN TIME: CPT | Performed by: INTERNAL MEDICINE

## 2020-02-05 PROCEDURE — 85730 THROMBOPLASTIN TIME PARTIAL: CPT | Performed by: INTERNAL MEDICINE

## 2020-02-05 PROCEDURE — 99239 HOSP IP/OBS DSCHRG MGMT >30: CPT | Performed by: PHYSICIAN ASSISTANT

## 2020-02-05 RX ADMIN — Medication 50 MG: at 09:01

## 2020-02-05 RX ADMIN — DOCUSATE SODIUM 100 MG: 100 CAPSULE, LIQUID FILLED ORAL at 17:14

## 2020-02-05 RX ADMIN — HYDROCHLOROTHIAZIDE 50 MG: 25 TABLET ORAL at 09:00

## 2020-02-05 RX ADMIN — OXYCODONE HYDROCHLORIDE AND ACETAMINOPHEN 500 MG: 500 TABLET ORAL at 09:01

## 2020-02-05 RX ADMIN — CALCIUM 1 TABLET: 500 TABLET ORAL at 09:01

## 2020-02-05 RX ADMIN — QUETIAPINE FUMARATE 25 MG: 25 TABLET ORAL at 21:06

## 2020-02-05 RX ADMIN — Medication 1 TABLET: at 09:01

## 2020-02-05 RX ADMIN — DOCUSATE SODIUM 100 MG: 100 CAPSULE, LIQUID FILLED ORAL at 09:00

## 2020-02-05 RX ADMIN — WARFARIN SODIUM 15 MG: 7.5 TABLET ORAL at 17:14

## 2020-02-05 RX ADMIN — HEPARIN SODIUM AND DEXTROSE 14 UNITS/KG/HR: 10000; 5 INJECTION INTRAVENOUS at 12:20

## 2020-02-05 NOTE — PLAN OF CARE
Problem: PHYSICAL THERAPY ADULT  Goal: Performs mobility at highest level of function for planned discharge setting  See evaluation for individualized goals  Description  Treatment/Interventions: Functional transfer training, LE strengthening/ROM, Therapeutic exercise, Endurance training, Patient/family training, Equipment eval/education, Bed mobility, Gait training, Spoke to nursing  Equipment Recommended: Yeni Mayer       See flowsheet documentation for full assessment, interventions and recommendations  Outcome: Progressing  Note:   Prognosis: Good  Problem List: Decreased strength, Decreased endurance, Impaired balance, Decreased mobility, Orthopedic restrictions  Assessment: Patient able to ambulate increased distances this session with use of RW, supervision, however continues to require increased time as well as rest breaks secondary to fatigue and SOB  Patient educated on proper pacing and to take rest breaks as needed for safety with patient reporting understanding  Patient also reporting increased discomfort towards end of ambulation trial, subsided with rest breaks, with patient educated on [address] status patient reporting understanding  Patient tolerated seated exercise well, with patient educated on exercise program  Patient would continue to benefit from physical therapy to improve functional mobility until medically cleared  Barriers to Discharge: Decreased caregiver support  Barriers to Discharge Comments: Pt  lives alone  Recommendation: Post acute IP rehab     PT - OK to Discharge: (S) Yes(once medically cleared to rehab)    See flowsheet documentation for full assessment

## 2020-02-05 NOTE — ASSESSMENT & PLAN NOTE
Lab Results   Component Value Date    HGBA1C 6 7 (H) 01/21/2020     Recent Labs     02/04/20  1608 02/04/20  2114 02/05/20  0645 02/05/20  1052   POCGLU 108 155* 122 141*     Blood Sugar Average: Last 72 hrs:  (P) 118 [id number]   · Hold metformin - can probably keep on hold while in STR while on set diet - consider restarting on discharge to home    · ISS

## 2020-02-05 NOTE — ASSESSMENT & PLAN NOTE
· Secondary to recent foot surgery and immobility  · S/p right and left EKOS catheter placement with continuous TPA and heparin infusion  Catheters removed  · Pulmonary was following  · Patient on heparin drip bridging to coumadin  · INR still not therapeutic, 1 8 today   coumadin 15mg tonight  · Labs in am

## 2020-02-05 NOTE — UTILIZATION REVIEW
Continued Stay Review    Date: 2/2-2/5/20                          Current Patient Class: inpatient  Current Level of Care: med surg since 1/24/20    HPI:59 y o  female w/hx morbid obesity, htn, dm, mk, and recent L foot fx s/p surgical intervention initially admitted on 1/22/20 as inpatient due to extensive LLE DVT, submassive bilateral PE's with R heart strain  Was transferred emergently from [address] [address] stepdown unit to [address] [place] [address] for higher level of care  She received TPA via EKOS catheter, which was completed/removed 1/24/20  She has been on heparin bridge to coumadin since 1/25  Podiatry was consulted on 1/28 to evaluate her L foot surgical site and noted healing with WBAT via cam walker and rolling walker, STR recommended to regain strength  Rehab referrals were placed on 1/28  On 1/30 INR remained subtherapeutic, pt requested to go to [facility] instead of rehab  As of 2/1, INR was still subtherapeutic, and had dropped, requiring increase in coumadin  Assessment/Plan:   2/2: INR remains subtherapeutic  She can not be discharged until INR is >2  Remains on heparin gtt with coumadin  2/3: INR 1 48, increased coumadin to 15mg tonight; heparin gtt in progress  2/4: INR 1 67, keep coumadin at 15mg tonight; heparin gtt in progress  She has no new complaints  [facility] will accept patient on heparin gtt, insurance auth in progress  PT/OT notes were required and received after 1pm    2/5: INR 1 82  Heparin gtt in progress  Coumadin dose 15mg  Waiting insurance auth for [facility]       Pertinent Labs/Diagnostic Results:   Results from last 7 days   Lab Units 02/02/20  0451 01/30/20  0518   WBC Thousand/uL 9 44 10 01   HEMOGLOBIN g/dL 14 8 14 6   HEMATOCRIT % 47 3* 46 6*   PLATELETS Thousands/uL 197 185     Results from last 7 days   Lab Units 02/02/20  0451 01/31/20  0512   SODIUM mmol/L 136 138   POTASSIUM mmol/L 4 2 4 8   CHLORIDE mmol/L 103 104   CO2 mmol/L 29 32   ANION GAP mmol/L 4 2*   BUN mg/dL 15 17   CREATININE mg/dL 0 70 0 71   EGFR ml/min/1 73sq m 94 92   CALCIUM mg/dL 9 3 9 3     Results from last 7 days   Lab Units 02/05/20  1052 02/05/20  0645 02/04/20  2114 02/04/20  1608 02/04/20  1030 02/04/20  0654 02/03/20  2056 02/03/20  1610 02/03/20  1109 02/03/20  0635   POC GLUCOSE mg/dl 141* 122 155* 108 128 126 119 114 102 119     Results from last 7 days   Lab Units 02/02/20  0451 01/31/20  0512   GLUCOSE RANDOM mg/dL 123 114     Results from last 7 days   Lab Units 02/05/20  0528 02/04/20  0448 02/03/20  0503   PROTIME seconds 20 6* 19 2* 17 5*   INR  1 82* 1 67* 1 48*   PTT seconds 77* 70* 69*     Vital Signs:   Date/Time  Temp Pulse Resp  BP   SpO2    02/05/20 0700  98 °F (36 7 °C) 72 16  132/78   94 %    02/04/20 2352  97 9 °F (36 6 °C) 84 20  119/56   91 %    02/04/20 1500  98 6 °F (37 °C) 98 20  150/76   92 %    02/04/20 0713  97 7 °F (36 5 °C) 82 20  141/82   92 %    02/03/20 2324  97 5 °F (36 4 °C) 88 20  134/63   91 %    02/03/20 1522  98 2 °F (36 8 °C) 96 18  140/79   93 %        Medications:   Scheduled Medications:  Medications:  ascorbic acid 500 mg Oral Daily   calcium carbonate 1 tablet Oral Daily With Breakfast   docusate sodium 100 mg Oral BID   hydrochlorothiazide 50 mg Oral Daily   insulin lispro 2-12 Units Subcutaneous TID AC   multivitamin-minerals 1 tablet Oral Daily   QUEtiapine 25 mg Oral HS   warfarin 15 mg Oral Daily (warfarin)   zinc gluconate 50 mg Oral Daily     Continuous IV Infusions:  heparin (porcine) 3-30 Units/kg/hr (Order-Specific) Intravenous Titrated     PRN Meds:  acetaminophen 650 mg Oral Q6H PRN   heparin (porcine) 10,000 Units Intravenous PRN   heparin (porcine) 5,000 Units Intravenous PRN   polyethylene glycol 17 g Oral Daily PRN   sodium chloride 2 spray Each Nare Q1H PRN   traMADol 50 mg Oral Q6H PRN       Discharge Plan: TBD, awaiting insurance authorization for [address] [facility] Utilization Review Department  [email or web address]  ATTENTION: Please call with any questions or concerns to [phone] and carefully listen to the prompts so that you are directed to the right person  All voicemails are confidential   Jono Housre all requests for admission clinical reviews, approved or denied determinations and any other requests to dedicated fax number below belonging to the campus where the patient is receiving treatment   List of dedicated fax numbers for the Facilities:  [address] DENIALS (Administrative/Medical Necessity) [phone]   [address] (Maternity/NICU/Pediatrics) [phone]   Deneise Notice [phone]   Tom Trevino [phone]   Jm Villa [phone]   Dustin Mcintosh [phone]   [address] [place] [address] [address] [place] [address] [phone]   [facility]  [phone]   [address] [facility]  [address] [facility] 1000 [address] [facility] [phone]

## 2020-02-05 NOTE — PROGRESS NOTES
Progress Note - Jono Montiel 1960, 62 y o  female MRN: [id number]    Unit/Bed#: -01 Encounter: [id number]    Primary Care Provider: Ciro Wolff MD   Date and time admitted to hospital: 1/22/2020 10:22 PM         * Acute pulmonary embolism with acute cor pulmonale ([address] )  Assessment & Plan  · Secondary to recent foot surgery and immobility  · S/p right and left EKOS catheter placement with continuous TPA and heparin infusion  Catheters removed  · Pulmonary was following  · Patient on heparin drip bridging to coumadin  · INR still not therapeutic, 1 8 today  coumadin 15mg tonight  · Labs in am     DVT (deep venous thrombosis) ([facility])  Assessment & Plan  · On heparin drip bridging to coumadin  Status post left foot surgery  Assessment & Plan  · Patient has left boot  Podiatry evaluated and patient for rehab placement   · Surgery mid December 16th    Acute respiratory failure with hypoxia ([facility] )  Assessment & Plan  · 2/2 PE  · Now on room air    Morbid obesity with BMI of 50 0-59 9, adult [facility])  Assessment & Plan  · Secondary to excesses calorie intake  Type 2 diabetes mellitus without complication, without long-term current use of insulin [facility])  Assessment & Plan  Lab Results   Component Value Date    HGBA1C 6 7 (H) 01/21/2020     Recent Labs     02/04/20  1608 02/04/20  2114 02/05/20  0645 02/05/20  1052   POCGLU 108 155* 122 141*     Blood Sugar Average: Last 72 hrs:  (P) 118 [id number]   · Hold metformin - can probably keep on hold while in STR while on set diet - consider restarting on discharge to home  · ISS    Pulmonary hypertension ([facility] )  Assessment & Plan  · Likely 2/2 PE  · Cardiology and pulmonary were following  Repeat echo LVEF 65%, G1DD, with moderately dilated right ventricle with low-normal systolic function        VTE Pharmacologic Prophylaxis:   Pharmacologic: Heparin Drip  Mechanical VTE Prophylaxis in Place: Yes    Patient Centered Rounds: I have performed bedside rounds with nursing staff today  Discussions with Specialists or Other Care Team Provider: case management    Education and Discussions with Family / Patient: patient    Time Spent for Care: 30 minutes  More than 50% of total time spent on counseling and coordination of care as described above  Current Length of Stay: 14 day(s)    Current Patient Status: Inpatient   Certification Statement: The patient will continue to require additional inpatient hospital stay due to awaiting insurance authorization for rehab    Discharge Plan: STR when insurance authorization obtained  Code Status: Level 1 - Full Code      Subjective:   Offers no complaints  Objective:     Vitals:   Temp (24hrs), Av 2 °F (36 8 °C), Min:97 9 °F (36 6 °C), Max:98 6 °F (37 °C)    Temp:  [97 9 °F (36 6 °C)-98 6 °F (37 °C)] 98 °F (36 7 °C)  HR:  [72-98] 72  Resp:  [16-20] 16  BP: (119-150)/(56-78) 132/78  SpO2:  [91 %-94 %] 94 %  Body mass index is 51 29 kg/m²  Input and Output Summary (last 24 hours): Intake/Output Summary (Last 24 hours) at 2020 1219  Last data filed at 2020 2134  Gross per 24 hour   Intake 1180 ml   Output    Net 1180 ml       Physical Exam:     Physical Exam   Constitutional: She is oriented to person, place, and time  She appears well-developed and well-nourished  No distress  HENT:   Head: Normocephalic and atraumatic  Cardiovascular: Normal rate and regular rhythm  Exam reveals no friction rub  No murmur heard  Pulmonary/Chest: Effort normal and breath sounds normal  No respiratory distress  She has no wheezes  Abdominal: Soft  Bowel sounds are normal  She exhibits no distension  There is no tenderness  There is no rebound and no guarding  Musculoskeletal: She exhibits no edema  Neurological: She is alert and oriented to person, place, and time  No cranial nerve deficit  Skin: Skin is warm and dry  No rash noted     Left surgical boot in place   Psychiatric: She has a normal mood and affect  Nursing note and vitals reviewed  Additional Data:     Labs:    Results from last 7 days   Lab Units 02/02/20  0451   WBC Thousand/uL 9 44   HEMOGLOBIN g/dL 14 8   HEMATOCRIT % 47 3*   PLATELETS Thousands/uL 197     Results from last 7 days   Lab Units 02/02/20  0451   SODIUM mmol/L 136   POTASSIUM mmol/L 4 2   CHLORIDE mmol/L 103   CO2 mmol/L 29   BUN mg/dL 15   CREATININE mg/dL 0 70   ANION GAP mmol/L 4   CALCIUM mg/dL 9 3   GLUCOSE RANDOM mg/dL 123     Results from last 7 days   Lab Units 02/05/20  0528   INR  1 82*     Results from last 7 days   Lab Units 02/05/20  1052 02/05/20  0645 02/04/20  2114 02/04/20  1608 02/04/20  1030 02/04/20  0654 02/03/20  2056 02/03/20  1610 02/03/20  1109 02/03/20  0635 02/02/20  2132 02/02/20  1653   POC GLUCOSE mg/dl 141* 122 155* 108 128 126 119 114 102 119 105 100                   * I Have Reviewed All Lab Data Listed Above  * Additional Pertinent Lab Tests Reviewed:  All Labs Within Last 24 Hours Reviewed    Imaging:    Imaging Reports Reviewed Today Include: none  Imaging Personally Reviewed by Myself Includes:  none    Recent Cultures (last 7 days):           Last 24 Hours Medication List:     Current Facility-Administered Medications:  acetaminophen 650 mg Oral Q6H PRN [employer], CRNP    ascorbic acid 500 mg Oral Daily [employer], CRNP    calcium carbonate 1 tablet Oral Daily With Breakfast Meredith Santo, CRNP    docusate sodium 100 mg Oral BID Isaiah Meyer MD    heparin (porcine) 3-30 Units/kg/hr (Order-Specific) Intravenous Titrated [employer], CRNP Last Rate: 11 2 Units/kg/hr (02/04/20 2256)   heparin (porcine) 10,000 Units Intravenous PRN [employer], CRNP    heparin (porcine) 5,000 Units Intravenous PRN [employer], CRNP    hydrochlorothiazide 50 mg Oral Daily Meredith Santo, CRNP    insulin lispro 2-12 Units Subcutaneous TID AC [employer], CRNP    multivitamin-minerals 1 tablet Oral Daily [employer], CRNP polyethylene glycol 17 g Oral Daily PRN Jaimie Kenyon MD    QUEtiapine 25 mg Oral HS Natalia Bee CRNP    sodium chloride 2 spray Each Nare Q1H PRN Jaimie Kenyon MD    traMADol 50 mg Oral Q6H PRN Natalia Bee, CRNP    warfarin 15 mg Oral Daily (warfarin) Agustín Lama PA-C    zinc gluconate 50 mg Oral Daily JULISA Hernandez         Today, Patient Was Seen By: Vikash Saucedo PA-C    ** Please Note: Dictation voice to text software may have been used in the creation of this document   **

## 2020-02-05 NOTE — PHYSICAL THERAPY NOTE
Physical Therapy Progress Note     02/05/20 1619   Pain Assessment   Pain Assessment 0-10   Pain Score No Pain   Restrictions/Precautions   Weight Bearing Precautions Per Order Yes   LLE Weight Bearing Per Order WBAT   Braces or Orthoses CAM Boot   Other Precautions WBS; Fall Risk   General   Chart Reviewed Yes   Response to Previous Treatment Patient with no complaints from previous session  Family/Caregiver Present No   Cognition   Overall Cognitive Status WFL   Arousal/Participation Alert; Responsive; Cooperative   Following Commands Follows all commands and directions without difficulty   Subjective   Subjective Patient seated in chair, agreeable to physical therapy, cleared by RN for session  Patient requesting to use bathroom at start of session  Transfers   Sit to Stand 5  Supervision   Additional items Increased time required   Stand to Sit 5  Supervision   Additional items Increased time required   Stand pivot 4  Minimal assistance   Additional items Assist x 1; Increased time required;Verbal cues   Toilet transfer 5  Supervision   Additional items Increased time required   Ambulation/Elevation   Gait pattern Excessively slow; Step to;Short stride; Foward flexed; Inconsistent rhoda;Decreased foot clearance   Gait Assistance 5  Supervision   Additional items Verbal cues   Assistive Device Bariatric Rolling walker   Distance 150ft x2    Balance   Static Sitting Good   Dynamic Sitting Fair +   Static Standing Fair   Dynamic Standing Fair -   Ambulatory Fair -   Endurance Deficit   Endurance Deficit Yes   Endurance Deficit Description fatigue, pain, SOB   Activity Tolerance   Activity Tolerance Patient tolerated treatment well;Patient limited by fatigue   Nurse Made Aware appropriate to see   Exercises   TKR Sitting;10 reps;AROM; Bilateral   Assessment   Prognosis Good   Problem List Decreased strength;Decreased endurance; Impaired balance;Decreased mobility;Orthopedic restrictions   Assessment Patient able to ambulate increased distances this session with use of RW, supervision, however continues to require increased time as well as rest breaks secondary to fatigue and SOB  Patient educated on proper pacing and to take rest breaks as needed for safety with patient reporting understanding  Patient also reporting increased discomfort towards end of ambulation trial, subsided with rest breaks, with patient educated on [address] status patient reporting understanding  Patient tolerated seated exercise well, with patient educated on exercise program  Patient would continue to benefit from physical therapy to improve functional mobility until medically cleared  Goals   Patient Goals to get better   [facility] Expiration Date 02/09/20   Short Term Goal #1 1  Pt will demonstrate ability to perform all aspects of bed mobility with I in order to increase independence and decrease burden on caregivers  2  Pt will demonstrate ability to perform functional transfers with Mod I in order to increase independence and decrease burden on caregivers  3  Pt will demonstrate ability to use kneeling scooter for functional mobility 200 ft with Mod I in order to increase independence  4  Pt will demonstrate improved balance by one grade order to decrease risk of falls  5  Pt will increase b/l LE strength by 1 grade in order to increase ease of functional mobility and transfers  Short Term Goal #2 In addition to the above stated goals, 6 Pt will demonstrate the ability to ambulate at least 150ft with least restrictive device at Mod I in order to maximize functional independence  7 Pt will demonstrate the ability to negotiate 3 steps with HR and supervision in order to return to household/community mobility   PT Treatment Day 5   Plan   Treatment/Interventions Functional transfer training;LE strengthening/ROM; Therapeutic exercise; Endurance training;Patient/family training;Gait training;Spoke to nursing   Progress Progressing toward goals   PT Frequency Other (Comment)  (3-5x/wk)   Recommendation   Recommendation Post acute IP rehab   Equipment Recommended Walker   PT - OK to Discharge Yes  (once medically cleared to rehab)     Morales Moseley, PTA

## 2020-02-05 NOTE — DISCHARGE SUMMARY
Discharge- Dhara Camarena 1960, 62 y o  female MRN: [id number]    Unit/Bed#: -01 Encounter: [id number]    Primary Care Provider: Anita Black MD   Date and time admitted to hospital: 1/22/2020 10:22 PM        * Acute pulmonary embolism with acute cor pulmonale ([facility] )  Assessment & Plan  · Secondary to recent foot surgery and immobility  · S/p right and left EKOS catheter placement with continuous TPA and heparin infusion  Catheters removed  · Pulmonary was following  · Patient on heparin drip bridging to coumadin  · INR still not therapeutic, 1 8 today  coumadin 15mg tonight  · Labs in am     DVT (deep venous thrombosis) ([facility])  Assessment & Plan  · On heparin drip bridging to coumadin  Status post left foot surgery  Assessment & Plan  · Patient has left boot  Podiatry evaluated and patient for rehab placement   · Surgery mid December 16th    Acute respiratory failure with hypoxia ([facility] )  Assessment & Plan  · 2/2 PE  · Now on room air    Morbid obesity with BMI of 50 0-59 9, adult [facility])  Assessment & Plan  · Secondary to excesses calorie intake  Type 2 diabetes mellitus without complication, without long-term current use of insulin [facility])  Assessment & Plan  Lab Results   Component Value Date    HGBA1C 6 7 (H) 01/21/2020     Recent Labs     02/04/20  1608 02/04/20  2114 02/05/20  0645 02/05/20  1052   POCGLU 108 155* 122 141*     Blood Sugar Average: Last 72 hrs:  (P) 118 [id number]   · Hold metformin - can probably keep on hold while in STR while on set diet - consider restarting on discharge to home  · ISS    Pulmonary hypertension ([facility] )  Assessment & Plan  · Likely 2/2 PE  · Cardiology and pulmonary were following  Repeat echo LVEF 65%, G1DD, with moderately dilated right ventricle with low-normal systolic function          Discharging Physician / Practitioner: Madison Jaramillo PA-C  PCP: Anita Black MD  Admission Date:   Admission Orders (From admission, onward) Ordered        01/22/20 2239  Inpatient Admission  Once                   Discharge Date: 02/06/20    Resolved Problems  Date Reviewed: 2/5/2020    None          Consultations During Hospital Stay:  · Dr Dinorah Boles  · Dr Edna Che  · Dr Liam Beck  · Dr Emily Luna    Procedures Performed:     CTA chest - Large bilateral central pulmonary emboli      The calculated ratio of right ventricular to left ventricular diameter (RV/LV ratio) is 1 66      This is greater than 0 9, which is abnormal and indicates right heart strain  An abnormal RV/LV ratio has been shown to be associated with an increased risk of 30 day mortality in the setting of acute pulmonary embolism  Urgent consultation with the   medical critical care team is recommended      LE dopplers - RIGHT LOWER LIMB:  No gross evidence of acute or chronic deep vein thrombosis  No evidence of superficial thrombophlebitis noted  Doppler evaluation shows a normal response to augmentation maneuvers  Popliteal, posterior tibial and anterior tibial arterial Doppler waveforms are  triphasic  LEFT LOWER LIMB:  Acute deep vein thrombosis is noted in the distal femoral vein, popliteal vein,  and in the visualized portions of the posterior tibial and peroneal veins  No evidence of superficial thrombophlebitis noted  Doppler evaluation shows a normal response to augmentation maneuvers  Popliteal, posterior tibial and anterior tibial arterial Doppler waveforms are  Triphasic  CT head - No acute intracranial abnormality    IR pulmonary lysis 1/23 - Placement of bilateral EKOS pulmonary thrombolysis infusion catheters into the left and right lower lobe pulmonary arteries  CXR - 1  Cardiomegaly; otherwise no acute cardiopulmonary disease    2  Thrombolytic catheters as described    Significant Findings / Test Results:   · See above    Incidental Findings:   · none     Test Results Pending at Discharge (will require follow up):   · none     Outpatient Tests Requested:  · INR goal INR 2-3    Complications:  none    Reason for Admission:  Shortness of breath    Hospital Course:     Edmundo Ponce is a 62 y o  female patient who originally presented to the hospital on 1/22/2020 as a transfer from [address] [place] [address] Drive secondary to bilateral PE's  Patient had recent foot surgery and present the hospital with shortness of breath  She was found to have significant bilateral PEs  She was transferred to [facility] to be evaluated for lysis  Patient was seen in consultation by IR  They proceeded with PE lysis  Patient tolerated the procedure well  Patient stabilized from his pulmonary standpoint  She was bridged on heparin drip to Coumadin  INR on idscharge is 1 84  Will discharge on Coumadin 20mg Tuesday and Thursday and 15mg all other days  Patient will be discharged to short-term rehab  Please see above list of diagnoses and related plan for additional information  Condition at Discharge: good     Discharge Day Visit / Exam:     Subjective:  Offers no complaints  Vitals: Blood Pressure: 135/96 (02/06/20 0700)  Pulse: 90 (02/06/20 0700)  Temperature: 98 °F (36 7 °C) (02/06/20 0700)  Temp Source: Oral (02/05/20 2336)  Respirations: 16 (02/06/20 0700)  Height: 5' 8" (172 7 cm) (01/23/20 0000)  Weight - Scale: (!) 153 kg (337 lb 4 9 oz) (01/23/20 0000)  SpO2: 93 % (02/06/20 0700)  Exam:   Physical Exam   Constitutional: She is oriented to person, place, and time  She appears well-developed and well-nourished  No distress  HENT:   Head: Normocephalic and atraumatic  Cardiovascular: Normal rate and regular rhythm  Exam reveals no friction rub  No murmur heard  Pulmonary/Chest: Effort normal and breath sounds normal  No respiratory distress  She has no wheezes  Abdominal: Soft  Bowel sounds are normal  She exhibits no distension  There is no tenderness  There is no rebound and no guarding     Musculoskeletal: She exhibits no edema  Neurological: She is alert and oriented to person, place, and time  No cranial nerve deficit  Skin: Skin is warm and dry  No rash noted  Right surgical boot in place   Psychiatric: She has a normal mood and affect  Nursing note and vitals reviewed  Discussion with Family:     Discharge instructions/Information to patient and family:   See after visit summary for information provided to patient and family  Provisions for Follow-Up Care:  See after visit summary for information related to follow-up care and any pertinent home health orders  Disposition:     Acute Rehab at [facility]    For Discharges to [facility] SNF:   · Not Applicable to this Patient - Not Applicable to this Patient    Planned Readmission: none     Discharge Statement:  I spent 45 minutes discharging the patient  This time was spent on the day of discharge  I had direct contact with the patient on the day of discharge  Greater than 50% of the total time was spent examining patient, answering all patient questions, arranging and discussing plan of care with patient as well as directly providing post-discharge instructions  Additional time then spent on discharge activities  Discharge Medications:  See after visit summary for reconciled discharge medications provided to patient and family        ** Please Note: This note has been constructed using a voice recognition system **

## 2020-02-05 NOTE — PLAN OF CARE
Problem: Potential for Falls  Goal: Patient will remain free of falls  Description  INTERVENTIONS:  - Assess patient frequently for physical needs  -  Identify cognitive and physical deficits and behaviors that affect risk of falls    -  [place] fall precautions as indicated by assessment   - Educate patient/family on patient safety including physical limitations  - Instruct patient to call for assistance with activity based on assessment  - Modify environment to reduce risk of injury  - Consider OT/PT consult to assist with strengthening/mobility  Outcome: Progressing     Problem: Prexisting or High Potential for Compromised Skin Integrity  Goal: Skin integrity is maintained or improved  Description  INTERVENTIONS:  - Identify patients at risk for skin breakdown  - Assess and monitor skin integrity  - Assess and monitor nutrition and hydration status  - Monitor labs   - Assess for incontinence   - Turn and reposition patient  - Assist with mobility/ambulation  - Relieve pressure over bony prominences  - Avoid friction and shearing  - Provide appropriate hygiene as needed including keeping skin clean and dry  - Evaluate need for skin moisturizer/barrier cream  - Collaborate with interdisciplinary team   - Patient/family teaching  - Consider wound care consult   Outcome: Progressing     Problem: PAIN - ADULT  Goal: Verbalizes/displays adequate comfort level or baseline comfort level  Description  Interventions:  - Encourage patient to monitor pain and request assistance  - Assess pain using appropriate pain scale  - Administer analgesics based on type and severity of pain and evaluate response  - Implement non-pharmacological measures as appropriate and evaluate response  - Consider cultural and social influences on pain and pain management  - Notify physician/advanced practitioner if interventions unsuccessful or patient reports new pain  Outcome: Progressing     Problem: INFECTION - ADULT  Goal: Absence or prevention of progression during hospitalization  Description  INTERVENTIONS:  - Assess and monitor for signs and symptoms of infection  - Monitor lab/diagnostic results  - Monitor all insertion sites, i e  indwelling lines, tubes, and drains  - Monitor endotracheal if appropriate and nasal secretions for changes in amount and color  - [place] appropriate cooling/warming therapies per order  - Administer medications as ordered  - Instruct and encourage patient and family to use good hand hygiene technique  - Identify and instruct in appropriate isolation precautions for identified infection/condition  Outcome: Progressing     Problem: SAFETY ADULT  Goal: Maintain or return to baseline ADL function  Description  INTERVENTIONS:  -  Assess patient's ability to carry out ADLs; assess patient's baseline for ADL function and identify physical deficits which impact ability to perform ADLs (bathing, care of mouth/teeth, toileting, grooming, dressing, etc )  - Assess/evaluate cause of self-care deficits   - Assess range of motion  - Assess patient's mobility; develop plan if impaired  - Assess patient's need for assistive devices and provide as appropriate  - Encourage maximum independence but intervene and supervise when necessary  - Involve family in performance of ADLs  - Assess for home care needs following discharge   - Consider OT consult to assist with ADL evaluation and planning for discharge  - Provide patient education as appropriate  Outcome: Progressing  Goal: Maintain or return mobility status to optimal level  Description  INTERVENTIONS:  - Assess patient's baseline mobility status (ambulation, transfers, stairs, etc )    - Identify cognitive and physical deficits and behaviors that affect mobility  - Identify mobility aids required to assist with transfers and/or ambulation (gait belt, sit-to-stand, lift, walker, cane, etc )  - [place] fall precautions as indicated by assessment  - Record patient progress and toleration of activity level on Mobility SBAR; progress patient to next Phase/Stage  - Instruct patient to call for assistance with activity based on assessment  - Consider rehabilitation consult to assist with strengthening/weightbearing, etc   Outcome: Progressing     Problem: DISCHARGE PLANNING  Goal: Discharge to home or other facility with appropriate resources  Description  INTERVENTIONS:  - Identify barriers to discharge w/patient and caregiver  - Arrange for needed discharge resources and transportation as appropriate  - Identify discharge learning needs (meds, wound care, etc )  - Arrange for interpretive services to assist at discharge as needed  - Refer to Case Management Department for coordinating discharge planning if the patient needs post-hospital services based on physician/advanced practitioner order or complex needs related to functional status, cognitive ability, or social support system  Outcome: Progressing     Problem: Knowledge Deficit  Goal: Patient/family/caregiver demonstrates understanding of disease process, treatment plan, medications, and discharge instructions  Description  Complete learning assessment and assess knowledge base    Interventions:  - Provide teaching at level of understanding  - Provide teaching via preferred learning methods  Outcome: Progressing     Problem: CARDIOVASCULAR - ADULT  Goal: Maintains optimal cardiac output and hemodynamic stability  Description  INTERVENTIONS:  - Monitor I/O, vital signs and rhythm  - Monitor for S/S and trends of decreased cardiac output  - Administer and titrate ordered vasoactive medications to optimize hemodynamic stability  - Assess quality of pulses, skin color and temperature  - Assess for signs of decreased coronary artery perfusion  - Instruct patient to report change in severity of symptoms  Outcome: Progressing  Goal: Absence of cardiac dysrhythmias or at baseline rhythm  Description  INTERVENTIONS:  - Continuous cardiac monitoring, vital signs, obtain 12 lead EKG if ordered  - Administer antiarrhythmic and heart rate control medications as ordered  - Monitor electrolytes and administer replacement therapy as ordered  Outcome: Progressing     Problem: RESPIRATORY - ADULT  Goal: Achieves optimal ventilation and oxygenation  Description  INTERVENTIONS:  - Assess for changes in respiratory status  - Assess for changes in mentation and behavior  - Position to facilitate oxygenation and minimize respiratory effort  - Oxygen administered by appropriate delivery if ordered  - Initiate smoking cessation education as indicated  - Encourage broncho-pulmonary hygiene including cough, deep breathe, Incentive Spirometry  - Assess the need for suctioning and aspirate as needed  - Assess and instruct to report SOB or any respiratory difficulty  - Respiratory Therapy support as indicated  Outcome: Progressing     Problem: Nutrition/Hydration-ADULT  Goal: Nutrient/Hydration intake appropriate for improving, restoring or maintaining nutritional needs  Description  Monitor and assess patient's nutrition/hydration status for malnutrition  Collaborate with interdisciplinary team and initiate plan and interventions as ordered  Monitor patient's weight and dietary intake as ordered or per policy  Utilize nutrition screening tool and intervene as necessary  Determine patient's food preferences and provide high-protein, high-caloric foods as appropriate       INTERVENTIONS:  - Monitor oral intake, urinary output, labs, and treatment plans  - Assess nutrition and hydration status and recommend course of action  - Evaluate amount of meals eaten  - Assist patient with eating if necessary   - Allow adequate time for meals  - Recommend/ encourage appropriate diets, oral nutritional supplements, and vitamin/mineral supplements  - Order, calculate, and assess calorie counts as needed  - Recommend, monitor, and adjust tube feedings and TPN/PPN based on assessed needs  - Assess need for intravenous fluids  - Provide specific nutrition/hydration education as appropriate  - Include patient/family/caregiver in decisions related to nutrition  Outcome: Progressing     Problem: GENITOURINARY - ADULT  Goal: Maintains or returns to baseline urinary function  Description  INTERVENTIONS:  - Assess urinary function  - Encourage oral fluids to ensure adequate hydration if ordered  - Administer IV fluids as ordered to ensure adequate hydration  - Administer ordered medications as needed  - Offer frequent toileting  - Follow urinary retention protocol if ordered  Outcome: Progressing

## 2020-02-05 NOTE — ASSESSMENT & PLAN NOTE
Lab Results   Component Value Date    HGBA1C 6 7 (H) 01/21/2020     Recent Labs     02/04/20  1608 02/04/20  2114 02/05/20  0645 02/05/20  1052   POCGLU 108 155* 122 141*     Blood Sugar Average: Last 72 hrs:  (P) 118 [id number]   · Hold metformin - can probably keep on hold while in STR while on set diet - consider restarting on discharge to home    · ISS

## 2020-02-06 ENCOUNTER — HOSPITAL ENCOUNTER (INPATIENT)
Facility: HOSPITAL | Age: 60
LOS: 6 days | Discharge: HOME HEALTH | DRG: 176 | End: 2020-02-12
Attending: FAMILY MEDICINE | Admitting: FAMILY MEDICINE
Payer: COMMERCIAL

## 2020-02-06 VITALS
RESPIRATION RATE: 16 BRPM | HEIGHT: 68 IN | WEIGHT: 293 LBS | BODY MASS INDEX: 44.41 KG/M2 | DIASTOLIC BLOOD PRESSURE: 96 MMHG | SYSTOLIC BLOOD PRESSURE: 135 MMHG | TEMPERATURE: 98 F | OXYGEN SATURATION: 93 % | HEART RATE: 90 BPM

## 2020-02-06 DIAGNOSIS — S93.325S: ICD-10-CM

## 2020-02-06 DIAGNOSIS — E11.9 TYPE 2 DIABETES MELLITUS WITHOUT COMPLICATION, WITHOUT LONG-TERM CURRENT USE OF INSULIN (HCC): ICD-10-CM

## 2020-02-06 DIAGNOSIS — I26.02 ACUTE SADDLE PULMONARY EMBOLISM WITH ACUTE COR PULMONALE (HCC): Primary | ICD-10-CM

## 2020-02-06 DIAGNOSIS — E66.01 MORBID OBESITY WITH BMI OF 50.0-59.9, ADULT (HCC): ICD-10-CM

## 2020-02-06 LAB
APTT PPP: 68 SECONDS (ref 23–37)
GLUCOSE SERPL-MCNC: 115 MG/DL (ref 65–140)
GLUCOSE SERPL-MCNC: 94 MG/DL (ref 65–140)
INR PPP: 1.84 (ref 0.84–1.19)
PROTHROMBIN TIME: 20.8 SECONDS (ref 11.6–14.5)

## 2020-02-06 PROCEDURE — 85610 PROTHROMBIN TIME: CPT | Performed by: PHYSICIAN ASSISTANT

## 2020-02-06 PROCEDURE — 97535 SELF CARE MNGMENT TRAINING: CPT

## 2020-02-06 PROCEDURE — 85730 THROMBOPLASTIN TIME PARTIAL: CPT | Performed by: INTERNAL MEDICINE

## 2020-02-06 PROCEDURE — 82948 REAGENT STRIP/BLOOD GLUCOSE: CPT

## 2020-02-06 PROCEDURE — 97167 OT EVAL HIGH COMPLEX 60 MIN: CPT

## 2020-02-06 PROCEDURE — 99305 1ST NF CARE MODERATE MDM 35: CPT | Performed by: FAMILY MEDICINE

## 2020-02-06 RX ORDER — ECHINACEA PURPUREA EXTRACT 125 MG
2 TABLET ORAL AS NEEDED
Refills: 0
Start: 2020-02-06

## 2020-02-06 RX ORDER — POLYETHYLENE GLYCOL 3350 17 G/17G
17 POWDER, FOR SOLUTION ORAL DAILY PRN
Status: CANCELLED | OUTPATIENT
Start: 2020-02-06

## 2020-02-06 RX ORDER — TRAMADOL HYDROCHLORIDE 50 MG/1
50 TABLET ORAL EVERY 6 HOURS PRN
Status: CANCELLED | OUTPATIENT
Start: 2020-02-06

## 2020-02-06 RX ORDER — HYDROCHLOROTHIAZIDE 25 MG/1
50 TABLET ORAL DAILY
Status: DISCONTINUED | OUTPATIENT
Start: 2020-02-07 | End: 2020-02-12 | Stop reason: HOSPADM

## 2020-02-06 RX ORDER — ACETAMINOPHEN 325 MG/1
650 TABLET ORAL EVERY 6 HOURS PRN
Qty: 30 TABLET | Refills: 0
Start: 2020-02-06

## 2020-02-06 RX ORDER — QUETIAPINE FUMARATE 25 MG/1
25 TABLET, FILM COATED ORAL
Qty: 3 TABLET | Refills: 0 | Status: ON HOLD | OUTPATIENT
Start: 2020-02-06 | End: 2020-02-11 | Stop reason: SDUPTHER

## 2020-02-06 RX ORDER — WARFARIN SODIUM 7.5 MG/1
15 TABLET ORAL SEE ADMIN INSTRUCTIONS
Status: CANCELLED | OUTPATIENT
Start: 2020-02-06

## 2020-02-06 RX ORDER — DOCUSATE SODIUM 100 MG/1
100 CAPSULE, LIQUID FILLED ORAL 2 TIMES DAILY
Status: DISCONTINUED | OUTPATIENT
Start: 2020-02-06 | End: 2020-02-12 | Stop reason: HOSPADM

## 2020-02-06 RX ORDER — ZINC GLUCONATE 50 MG
50 TABLET ORAL DAILY
Status: CANCELLED | OUTPATIENT
Start: 2020-02-07

## 2020-02-06 RX ORDER — CALCIUM CARBONATE 500(1250)
1 TABLET ORAL
Status: CANCELLED | OUTPATIENT
Start: 2020-02-07

## 2020-02-06 RX ORDER — WARFARIN SODIUM 7.5 MG/1
15 TABLET ORAL
Status: DISCONTINUED | OUTPATIENT
Start: 2020-02-07 | End: 2020-02-06 | Stop reason: HOSPADM

## 2020-02-06 RX ORDER — TRAMADOL HYDROCHLORIDE 50 MG/1
50 TABLET ORAL EVERY 6 HOURS PRN
Status: DISCONTINUED | OUTPATIENT
Start: 2020-02-06 | End: 2020-02-12 | Stop reason: HOSPADM

## 2020-02-06 RX ORDER — CALCIUM CARBONATE 500(1250)
1 TABLET ORAL
Status: DISCONTINUED | OUTPATIENT
Start: 2020-02-07 | End: 2020-02-12 | Stop reason: HOSPADM

## 2020-02-06 RX ORDER — WARFARIN SODIUM 10 MG/1
20 TABLET ORAL SEE ADMIN INSTRUCTIONS
Start: 2020-02-06 | End: 2020-02-12 | Stop reason: HOSPADM

## 2020-02-06 RX ORDER — DOCUSATE SODIUM 100 MG/1
100 CAPSULE, LIQUID FILLED ORAL 2 TIMES DAILY
Status: CANCELLED | OUTPATIENT
Start: 2020-02-06

## 2020-02-06 RX ORDER — ASCORBIC ACID 500 MG
500 TABLET ORAL DAILY
Status: CANCELLED | OUTPATIENT
Start: 2020-02-07

## 2020-02-06 RX ORDER — HEPARIN SODIUM 10000 [USP'U]/100ML
INJECTION, SOLUTION INTRAVENOUS
Status: DISCONTINUED
Start: 2020-02-06 | End: 2020-02-06 | Stop reason: HOSPADM

## 2020-02-06 RX ORDER — WARFARIN SODIUM 5 MG/1
20 TABLET ORAL
Status: DISCONTINUED | OUTPATIENT
Start: 2020-02-06 | End: 2020-02-08

## 2020-02-06 RX ORDER — QUETIAPINE FUMARATE 25 MG/1
25 TABLET, FILM COATED ORAL
Status: CANCELLED | OUTPATIENT
Start: 2020-02-06

## 2020-02-06 RX ORDER — HYDROCHLOROTHIAZIDE 25 MG/1
50 TABLET ORAL DAILY
Status: CANCELLED | OUTPATIENT
Start: 2020-02-07

## 2020-02-06 RX ORDER — ECHINACEA PURPUREA EXTRACT 125 MG
2 TABLET ORAL
Status: CANCELLED | OUTPATIENT
Start: 2020-02-06

## 2020-02-06 RX ORDER — DOCUSATE SODIUM 100 MG/1
100 CAPSULE, LIQUID FILLED ORAL 2 TIMES DAILY
Qty: 10 CAPSULE | Refills: 0
Start: 2020-02-06

## 2020-02-06 RX ORDER — ACETAMINOPHEN 325 MG/1
650 TABLET ORAL EVERY 6 HOURS PRN
Status: DISCONTINUED | OUTPATIENT
Start: 2020-02-06 | End: 2020-02-12 | Stop reason: HOSPADM

## 2020-02-06 RX ORDER — QUETIAPINE FUMARATE 25 MG/1
25 TABLET, FILM COATED ORAL
Status: DISCONTINUED | OUTPATIENT
Start: 2020-02-06 | End: 2020-02-12 | Stop reason: HOSPADM

## 2020-02-06 RX ORDER — WARFARIN SODIUM 7.5 MG/1
15 TABLET ORAL SEE ADMIN INSTRUCTIONS
Refills: 0
Start: 2020-02-06 | End: 2020-02-12 | Stop reason: HOSPADM

## 2020-02-06 RX ORDER — ECHINACEA PURPUREA EXTRACT 125 MG
2 TABLET ORAL
Status: DISCONTINUED | OUTPATIENT
Start: 2020-02-06 | End: 2020-02-12 | Stop reason: HOSPADM

## 2020-02-06 RX ORDER — POLYETHYLENE GLYCOL 3350 17 G/17G
17 POWDER, FOR SOLUTION ORAL DAILY PRN
Status: DISCONTINUED | OUTPATIENT
Start: 2020-02-06 | End: 2020-02-12 | Stop reason: HOSPADM

## 2020-02-06 RX ORDER — ZINC GLUCONATE 50 MG
50 TABLET ORAL DAILY
Status: DISCONTINUED | OUTPATIENT
Start: 2020-02-07 | End: 2020-02-06 | Stop reason: ALTCHOICE

## 2020-02-06 RX ORDER — ACETAMINOPHEN 325 MG/1
650 TABLET ORAL EVERY 6 HOURS PRN
Status: CANCELLED | OUTPATIENT
Start: 2020-02-06

## 2020-02-06 RX ORDER — TRAMADOL HYDROCHLORIDE 50 MG/1
50 TABLET ORAL EVERY 6 HOURS PRN
Qty: 12 TABLET | Refills: 0 | Status: SHIPPED | OUTPATIENT
Start: 2020-02-06 | End: 2020-02-12 | Stop reason: HOSPADM

## 2020-02-06 RX ORDER — ASCORBIC ACID 500 MG
500 TABLET ORAL DAILY
Status: DISCONTINUED | OUTPATIENT
Start: 2020-02-07 | End: 2020-02-09

## 2020-02-06 RX ORDER — WARFARIN SODIUM 5 MG/1
15 TABLET ORAL
Status: DISCONTINUED | OUTPATIENT
Start: 2020-02-07 | End: 2020-02-08

## 2020-02-06 RX ORDER — POLYETHYLENE GLYCOL 3350 17 G/17G
17 POWDER, FOR SOLUTION ORAL DAILY PRN
Qty: 14 EACH | Refills: 0
Start: 2020-02-06 | End: 2020-02-12 | Stop reason: HOSPADM

## 2020-02-06 RX ADMIN — DOCUSATE SODIUM 100 MG: 100 CAPSULE, LIQUID FILLED ORAL at 08:41

## 2020-02-06 RX ADMIN — Medication 1 TABLET: at 08:41

## 2020-02-06 RX ADMIN — HEPARIN SODIUM AND DEXTROSE 14 UNITS/KG/HR: 10000; 5 INJECTION INTRAVENOUS at 03:19

## 2020-02-06 RX ADMIN — OXYCODONE HYDROCHLORIDE AND ACETAMINOPHEN 500 MG: 500 TABLET ORAL at 08:42

## 2020-02-06 RX ADMIN — DOCUSATE SODIUM 100 MG: 100 CAPSULE, LIQUID FILLED ORAL at 17:11

## 2020-02-06 RX ADMIN — CALCIUM 1 TABLET: 500 TABLET ORAL at 08:41

## 2020-02-06 RX ADMIN — HYDROCHLOROTHIAZIDE 50 MG: 25 TABLET ORAL at 08:42

## 2020-02-06 RX ADMIN — WARFARIN SODIUM 20 MG: 5 TABLET ORAL at 17:11

## 2020-02-06 RX ADMIN — Medication 50 MG: at 08:41

## 2020-02-06 RX ADMIN — QUETIAPINE FUMARATE 25 MG: 25 TABLET ORAL at 21:09

## 2020-02-06 NOTE — ASSESSMENT & PLAN NOTE
Lab Results   Component Value Date    HGBA1C 6 7 (H) 01/21/2020     Hold metformin  Continue sliding scale

## 2020-02-06 NOTE — SOCIAL WORK
Cm reviewed patient's chart  Cm contacted insurance company to inquire about insurance authorization placed on 2/4  Cm informed by  Dann Busch that patient was approved on 2/4/2020  Cm informed that authorization was faxed to [phone]  Cm informed Dann Busch, that is not a fax number to CM  Cm provided correct number to  of [phone]  Robby also explained that no phone call was made to CM about auth  Dann Busch stated that she would make note that patient will dc from the hospital today going and advised that CM fax her own note about dc today  Auth information is [id number]  Authorization is good for 7 days and update to be provided to [facility] [employer]  Fax should be sent to prior authorization at fax number [phone]  Robby spoke with patient who is requesting a WC van to [facility]  She explains that all her family and friends are working and will not be able to transport  Patient in agreement with OOP cost of [address]  Robby able to arranged a [facility] with [facility] at 11:15pm  Cm informed all parties, and all are in agreement

## 2020-02-06 NOTE — PLAN OF CARE
Problem: Potential for Falls  Goal: Patient will remain free of falls  Description  INTERVENTIONS:  - Assess patient frequently for physical needs  -  Identify cognitive and physical deficits and behaviors that affect risk of falls    -  [place] fall precautions as indicated by assessment   - Educate patient/family on patient safety including physical limitations  - Instruct patient to call for assistance with activity based on assessment  - Modify environment to reduce risk of injury  - Consider OT/PT consult to assist with strengthening/mobility  Outcome: Progressing

## 2020-02-06 NOTE — OCCUPATIONAL THERAPY NOTE
Occupational Therapy Evaluation and Treatment   [id number] 15 min eval 25 min txt      Patient Name: Vincent Copeland  Today's Date: 2/6/2020  Problem List  Principal Problem:    Acute pulmonary embolism with acute cor pulmonale ([facility] )  Active Problems:    Type 2 diabetes mellitus without complication, without long-term current use of insulin ([facility] )    Essential hypertension    Morbid obesity due to excess calories ([facility] )    Status post left foot surgery    Past Medical History  Past Medical History:   Diagnosis Date    Colon polyp     Diabetes mellitus ([facility] )     pre    Hypertension     Muscle weakness     elev enzymes    Seasonal allergies      Past Surgical History  Past Surgical History:   Procedure Laterality Date    APPENDECTOMY      AUGMENTATION BREAST      CERVICAL BIOPSY  W/ LOOP ELECTRODE EXCISION      COLONOSCOPY  11/2019    DILATION AND CURETTAGE OF UTERUS      HYSTEROSCOPY N/A 3/20/2018    Procedure: HYSTEROSCOPY;  Surgeon: Alireza Parikh MD;  Location:  MAIN OR;  Service: Gynecology Oncology    IR PULMONARY LYSIS  1/23/2020    ORIF FOOT FRACTURE Left 12/16/2019    Procedure: ORIF LISFRANC;  Surgeon: Kathy Becerra DPM;  Location: [address] [place] [address] MAIN OR;  Service: Podiatry    CA DILATION/CURETTAGE,DIAGNOSTIC N/A 3/20/2018    Procedure: DILATATION AND CURETTAGE (D&C),HYSTEROSCOPY;  Surgeon: Alireza Parikh MD;  Location:  MAIN OR;  Service: Gynecology Oncology    TUBAL LIGATION      TUBAL REANASTOMOSIS       Vitals:    02/06/20 1212 02/06/20 1215 02/06/20 1228 02/06/20 1531   BP: 150/91 142/86 155/80 131/77   BP Location: Left arm Left arm Left arm Right arm   Pulse: 88 84 102 92   Resp: 18 18 19 20   Temp: (!) 97 °F (36 1 °C)   97 7 °F (36 5 °C)   TempSrc: Temporal   Temporal   SpO2: 90% 95% 95% 94%   Weight: (!) 150 kg (330 lb 11 oz)      Height:   5' 8" (1 727 m)      Time-  4313-6026 15min eval 25 min txt   Vitals- O2 monitored t/o evaluation >94% RA   Standardized Assessment- Barthel 65/100 Home Evaluation (virtual)- Will obtain photos as needed  Requested photos of steps if able  Pt states she will contact neighbor  Family/Caregiver Training- Family unable to A at time of d/c  Limited home support      Pt remains in room with all needs at end of session          02/06/20 1410   Note Type   Note type Eval/Treat   Restrictions/Precautions   Weight Bearing Precautions Per Order Yes   LLE Weight Bearing Per Order WBAT   Braces or Orthoses CAM Boot   Other Precautions WBS;O2;Fall Risk   Pain Assessment   Pain Assessment 0-10   Pain Score No Pain   Home Living   Type of Home Other (Comment)  (condo; 1 LOREN doorway )   Home Layout One level;Performs ADLs on one level; Able to live on main level with bedroom/bathroom;Stairs to enter with rails  (multiple steps** from parking lot to front foor )   Bathroom Shower/Tub Walk-in shower  (4-5 inch threshold )   Bathroom Toilet Standard   Bathroom Equipment Grab bars in shower; Shower chair;Grab bars around toilet   [address] Walker;Cane;Grab bars  (kneeling scoot )   Additional Comments unsure of # of steps from parking lot (15+) with R ascending HR-- limited support to provide pictures; reports steps are wide/deep enough for walker placement    Prior Function   Level of [place] Independent with ADLs and functional mobility   Lives With Alone   Receives Help From   (no home support, family does not live nearby )   [id number] [employer] in the last 6 months 1 to 4  (causing recent L foot injury  )   Vocational Full time employment  (works from home;  )   Comments As per pt report, pta living alone in a 1 level condo with 1 LOREN at doorway -- 15+ steps (R ascending HR) from parking lot + walkway to front door  Reports steps are wide/deep enough for RW placement   Ind with all ADL's, IADL's, and mobility without AD prior to foot surgery -- post op utilized knee scooter 2* (accessible in home)  (+) drives however unable d/t environment barriers to parking lot  Sleeps in standard bed  Limited home support    Lifestyle   Service to Others     Psychosocial   Psychosocial (WDL) WDL   Subjective   Subjective "I feel better than I did a few days ago"    ADL   Eating Assistance 7  Independent   Grooming Assistance 5  Supervision/Setup   UB Bathing Assistance 5  Supervision/Setup   LB Bathing Assistance 3  Moderate Assistance   UB Dressing Assistance 5  Supervision/Setup   LB Dressing Assistance 3  Moderate Assistance   Toileting Assistance  4  Minimal Assistance   Bed Mobility   Rolling R 6  Modified independent   Additional items Increased time required   Rolling L 6  Modified independent   Additional items Increased time required   Supine to Sit 6  Modified independent   Additional items Increased time required   Sit to Supine 6  Modified independent   Additional items Increased time required   Additional Comments HOB flat, No BR's    Transfers   Sit to Stand 5  Supervision  (c/S )   Additional items Armrests; Increased time required;Verbal cues   Stand to Sit 5  Supervision  (c/S)   Additional items Armrests; Increased time required;Verbal cues   Stand pivot 4  Minimal assistance  (CGA )   Additional items Bedrails; Increased time required;Verbal cues   Additional Comments with RW    Functional Mobility   Functional Mobility 5  Supervision  (c/S )   Additional Comments ~287 ft with 4 turns    Additional items Rolling walker   Balance   Static Sitting Good   Dynamic Sitting Fair +   Static Standing Fair   Dynamic Standing Fair -   Ambulatory Fair -   Activity Tolerance   Activity Tolerance Patient tolerated treatment well   Nurse Made Aware Pt cleared by RN for eval    RUE Assessment   RUE Assessment WFL  (4/5)   LUE Assessment   LUE Assessment WFL  (4/5)   Hand Function   Gross Motor Coordination Functional   Fine Motor Coordination Functional   Sensation   Light Touch No apparent deficits   Sharp/Dull No apparent deficits   Additional Comments no numbness or tingling in B/L UEs    Proprioception   Proprioception No apparent deficits   Vision-Basic Assessment   Current Vision Wears glasses only for reading   Vision - Complex Assessment   Ocular Range of Motion [facility]   Perception   Inattention/Neglect Appears intact   Cognition   Overall Cognitive Status WFL   Arousal/Participation Alert; Cooperative   Attention Within functional limits   Orientation Level Oriented X4   Memory Within functional limits   Following Commands Follows all commands and directions without difficulty   Comments motivated for d/c home    Assessment   Limitation Decreased ADL status; Decreased UE ROM; Decreased UE strength;Decreased Safe judgement during ADL;Decreased endurance;Decreased self-care trans;Decreased high-level ADLs   Prognosis Good   Assessment Pt is a 62 y o  female seen for OT evaluation s/p admit to [facility] on 2/6/2020 w/ Acute pulmonary embolism with acute cor pulmonale (HCC) -- s/p PE lysis  Recent fall causing dislocation of tarsometatarsal join of L foot, s/p ORIF on 12/16/19  WBAT LLE as per ortho orders-- CAM Boot  OT completed an extensive review of pt's medical and social history  As per chart review, result of  LLE doppler shows "Acute DVT  noted in the distal femoral vein, popliteal vein, and in the visualized portions of the posterior tibial and peroneal veins "  Comorbidities affecting pt's functional performance at time of assessment include: DM2, HTN, Obesity, s/p L foot surgery, Cervical stenosis, Acute respiratory failure with hypoxia, DVT  See additional PMH in pt chart  Personal factors affecting pt at time of IE include:  steps to enter environment, limited home support, behavioral pattern, difficulty performing ADLS, difficulty performing IADLS , health management  and environment   As per pt report, pta living alone in a 1 level condo with 1 LOREN at doorway -- 15+ steps (R ascending HR) from parking lot + walkway to front door  Reports steps are wide/deep enough for RW placement  Ind with all ADL's, IADL's, and mobility without AD prior to foot surgery -- post op utilized knee scooter (accessible in home)  (+) drives however unable d/t environment barriers to parking lot  Sleeps in standard bed  Limited home support  Works full-time from home  Pt currently  requires S UB ADLs, Mod A LB ADLs, Min A toileting, and c/S mobility with RW 2* the following deficits impacting occupational performance  Pt presents to OT below baseline due to the following performance deficits: weakness, decreased ROM, decreased strength, decreased balance, decreased tolerance, decreased safety awareness, increased pain and orthopedic restrictions  Pt to benefit from continued skilled OT services while in the hospital to address deficits as defined above and maximize level of functional independence w ADL's and functional mobility  Occupational performance areas to address include: grooming, bathing/shower, toilet hygiene, dressing, medication management, socialization, health maintenance, functional mobility, community mobility, clothing management, cleaning, meal prep, household maintenance and social participation  Pt with score of 65/100 on the Barthel Index  Based on OT evaluation, assessment(s), performance deficits listed, and current level of function, pt identified as a HIGH complexity evaluation  From OT standpoint, recommendation at time of d/c would be home OT  Goals   Patient Goals to go home    Plan   Treatment Interventions ADL retraining;Functional transfer training;UE strengthening/ROM; Endurance training;Patient/family training;Equipment evaluation/education; Neuromuscular reeducation; Compensatory technique education; Energy conservation; Activityengagement   Goal Expiration Date 02/20/20   OT Treatment Day 1   OT Frequency   (5-7x/wk)   Additional Treatment Session   Start Time 1425   End Time 1450 Treatment Assessment Pt seen for OT Txt s/p eval with focus on functional mobility (287 ft with RW), functional transfers from household surfaces, shower txfr over 4-5 inch threshold (+) GBs, and bed mobility  Pt able to adhere to WBAT orders  without c/o pain  (+) safety with RW noted  No SOB O2 monitored t/o >94% RA  Pt limited by ROM, weakness, fatigue, endurance, activity tolerance , standing tolerance, static/dynamic standing balance  and orthopedic Restrictions/WBS   Pt educated on POC/purpose/benefits of OT/PT while on TCF, safe functional transfer techniques with appropriate body mechanics and activity modification techniques for energy conservation  Pt would benefit from continued OT sessions to further address above deficits to maximize independence     Additional Treatment Day 1   Recommendation   OT Discharge Recommendation Home OT   OT - OK to Discharge No   Barthel Index   Feeding 10   Bathing 0   Grooming Score 5   Dressing Score 5   Bladder Score 10   Bowels Score 10   Toilet Use Score 5   Transfers (Bed/Chair) Score 10   Mobility (Level Surface) Score 10   Stairs Score 0   Barthel Index Score 65       Goals STG achieved within 2 weeks Performance at Initial Evaluation 2/6/2020  Current Performance (last date completed)   Grooming while standing at sink MI S 2/6 S   ADL transfers  MI c/S 2/6 c/S   Bathroom mobility with appropriate AD MI c/S 2/6 c/S   UB ADL  MI S 2/6 S   LB ADL, AE PRN + CAM BOOT  MI Mod A- No cam boot 2/6 Mod A - no cam boot    Toileting/clothing management and hygiene MI Min A 2/6 Min A   Dynamic standing balance for increased safety when completing purposeful tasks F+ F-  2/6 F-   Increase standing tolerance for inc'd safety with standing purposeful tasks 5-8 min  3 min  2/6 3 min    Participate in therex 1-3x/week for inc'd overall stamina/activity tolerance for purposeful tasks To be completed      shower stall transfer 4-5 inch threshold (+) GB's (+) chair  MI CGA  2/6 CGA (+) GBs    Kitchen mobility for inc'd independence and safety negotiating kitchen environment MI Unable to assess 2/6 Unable to assess   Household management (laundry/cleaning) MI Unable to assess 2/6 Unable to assess         Vimal Luna, MS, OTR/L

## 2020-02-06 NOTE — ASSESSMENT & PLAN NOTE
Patient was discharged from [address] [place] [address] after diagnosis of acute pulmonary embolism, status post PE lysis  Acute PE secondary to recent for surgery on the left side  Patient was started on heparin drip, transitioned to Coumadin  However on discharge Coumadin was still subtherapeutic at 1 84 on Coumadin 15 mg  The dose was increased to Coumadin 15 mg Monday Wednesday Friday Saturday and Sunday  And Coumadin 20 mg on Tuesday and Thursday  Repeat INR daily    As per patient, she did not qualify for Eliquis or Xarelto due to severe obesity

## 2020-02-06 NOTE — UTILIZATION REVIEW
Continued Stay Review    Date: 02/06/2020                          Current Patient Class: Inpatient  Current Level of Care: Med/Surg    HPI:59 y o  female initially admitted on 01/22/2020  Acute pulmonary embolism with acute cor pulmonale  DVT  Assessment/Plan: She can not be discharged until INR is >2  Remains on heparin gtt with coumadin    INR 1 84    Pertinent Labs/Diagnostic Results:   Results from last 7 days   Lab Units 02/02/20  0451   WBC Thousand/uL 9 44   HEMOGLOBIN g/dL 14 8   HEMATOCRIT % 47 3*   PLATELETS Thousands/uL 197     Results from last 7 days   Lab Units 02/02/20  0451 01/31/20  0512   SODIUM mmol/L 136 138   POTASSIUM mmol/L 4 2 4 8   CHLORIDE mmol/L 103 104   CO2 mmol/L 29 32   ANION GAP mmol/L 4 2*   BUN mg/dL 15 17   CREATININE mg/dL 0 70 0 71   EGFR ml/min/1 73sq m 94 92   CALCIUM mg/dL 9 3 9 3     Results from last 7 days   Lab Units 02/06/20  0620 02/05/20  2049 02/05/20  1626 02/05/20  1052 02/05/20  0645 02/04/20  2114 02/04/20  1608 02/04/20  1030 02/04/20  0654 02/03/20  2056   POC GLUCOSE mg/dl 115 161* 98 141* 122 155* 108 128 126 119     Results from last 7 days   Lab Units 02/02/20  0451 01/31/20  0512   GLUCOSE RANDOM mg/dL 123 114     Results from last 7 days   Lab Units 02/06/20  0611 02/05/20  0528 02/04/20  0448   PROTIME seconds 20 8* 20 6* 19 2*   INR  1 84* 1 82* 1 67*   PTT seconds 68* 77* 70*     Vital Signs: /96   Pulse 90   Temp 98 °F (36 7 °C)   Resp 16   Ht 5' 8" (1 727 m)   Wt (!) 153 kg (337 lb 4 9 oz)   SpO2 93%   BMI 51 29 kg/m²     Medications:   Scheduled Medications:  Medications:  ascorbic acid 500 mg Oral Daily   calcium carbonate 1 tablet Oral Daily With Breakfast   docusate sodium 100 mg Oral BID   heparin (porcine)      hydrochlorothiazide 50 mg Oral Daily   insulin lispro 2-12 Units Subcutaneous TID AC   multivitamin-minerals 1 tablet Oral Daily   QUEtiapine 25 mg Oral HS   warfarin 15 mg Oral Daily (warfarin)   zinc gluconate 50 mg Oral Daily   Continuous IV Infusions:  heparin (porcine) 3-30 Units/kg/hr (Order-Specific) Intravenous Titrated   PRN Meds:  acetaminophen 650 mg Oral Q6H PRN   heparin (porcine) 10,000 Units Intravenous PRN   heparin (porcine) 5,000 Units Intravenous PRN   polyethylene glycol 17 g Oral Daily PRN   sodium chloride 2 spray Each Nare Q1H PRN   traMADol 50 mg Oral Q6H PRN     Discharge Plan: TBD - DC to [id number] Utilization Review Department  [email or web address]  ATTENTION: Please call with any questions or concerns to [phone] and carefully listen to the prompts so that you are directed to the right person  All voicemails are confidential   Arturo lKein all requests for admission clinical reviews, approved or denied determinations and any other requests to dedicated fax number below belonging to the campus where the patient is receiving treatment   List of dedicated fax numbers for the Facilities:  [address] [place] [address] DENIALS (Administrative/Medical Necessity) [phone]   [address] (Maternity/NICU/Pediatrics) [phone]   Marlene Miranda [phone]   Holden Romo [phone]   Lilly Leary [phone]   Luigi Pardo [phone]   [address] [place] [address] [phone]   [facility]  [phone]   [address] [facility] [address]  [address] [facility] [address] [facility] [phone]

## 2020-02-06 NOTE — H&P
H&P- Elle James 1960, 62 y o  female MRN: [id number]    Unit/Bed#: -01 Encounter: [id number]    Primary Care Provider: Moses Delgado MD   Date and time admitted to hospital: 2/6/2020 12:00 PM        * Acute pulmonary embolism with acute cor pulmonale [facility])  Assessment & Plan  Patient was discharged from [facility] after diagnosis of acute pulmonary embolism, status post PE lysis  Acute PE secondary to recent for surgery on the left side  Patient was started on heparin drip, transitioned to Coumadin  However on discharge Coumadin was still subtherapeutic at 1 84 on Coumadin 15 mg  The dose was increased to Coumadin 15 mg Monday Wednesday Friday Saturday and Sunday  And Coumadin 20 mg on Tuesday and Thursday  Repeat INR daily  As per patient, she did not qualify for Eliquis or Xarelto due to severe obesity       Status post left foot surgery  Assessment & Plan  Dislocation of tarsometatarsal joint of left foot s/p ORIF on 12/16/19  Continue PT/OT    Morbid obesity due to excess calories [facility])  Assessment & Plan  Lifestyle modification    Essential hypertension  Assessment & Plan  Continue home medication hydrochlorothiazide 50 mg daily    Type 2 diabetes mellitus without complication, without long-term current use of insulin (HCC)  Assessment & Plan    Lab Results   Component Value Date    HGBA1C 6 7 (H) 01/21/2020     Hold metformin  Continue sliding scale        VTE Prophylaxis: Warfarin (Coumadin)  / reason for no mechanical VTE prophylaxis currently admitted in rehab   Code Status: full       Anticipated Length of Stay:  Patient will be admitted on an SNF Short Term Inpatient basis with an anticipated length of stay of  > 2 midnights  Justification for Hospital Stay: ongoing rehabilitiation    Total Time for Visit, including Counseling / Coordination of Care: 1 hour    Greater than 50% of this total time spent on direct patient counseling and coordination of care     Chief Complaint:   Decreased activity tolerance    History of Present Illness:    Vida Brown is a 62 y o  female who presents to transitional care unit after discharged from acute care hospital at [place]  Patient was admitted in [place] for pulmonary embolism, status post PE lysis, started on heparin drip, eventually transition to oral warfarin  The PE is caused from recent surgery on 12/16/2019 for left ankle, and immobility  On my exam, patient is sitting up, denies any short of breath, chest pain, nausea or vomiting  Review of Systems:    Review of Systems   Constitutional: Positive for fatigue  Negative for activity change, appetite change and fever  HENT: Negative for congestion and sore throat  Eyes: Negative for pain and visual disturbance  Respiratory: Negative for cough, shortness of breath and wheezing  Cardiovascular: Negative for chest pain, palpitations and leg swelling  Gastrointestinal: Negative for abdominal distention and abdominal pain  Endocrine: Negative for polyuria  Genitourinary: Negative for difficulty urinating and dysuria  Musculoskeletal: Positive for arthralgias, gait problem and joint swelling  Negative for back pain  Skin: Negative for rash and wound  Allergic/Immunologic: Negative for immunocompromised state  Neurological: Negative for dizziness, speech difficulty and headaches  Hematological: Negative for adenopathy  Psychiatric/Behavioral: Negative for sleep disturbance  The patient is not hyperactive          Past Medical and Surgical History:     Past Medical History:   Diagnosis Date    Colon polyp     Diabetes mellitus ([address] )     pre    Hypertension     Muscle weakness     elev enzymes    Seasonal allergies        Past Surgical History:   Procedure Laterality Date    APPENDECTOMY      AUGMENTATION BREAST      CERVICAL BIOPSY  W/ LOOP ELECTRODE EXCISION      COLONOSCOPY  11/2019    DILATION AND CURETTAGE OF UTERUS      HYSTEROSCOPY N/A 3/20/2018    Procedure: HYSTEROSCOPY;  Surgeon: Suzette Pollard MD;  Location:  MAIN OR;  Service: Gynecology Oncology    IR PULMONARY LYSIS  1/23/2020    ORIF FOOT FRACTURE Left 12/16/2019    Procedure: ORIF LISFRANC;  Surgeon: Noelle Bolden DPM;  Location: [address] [place] [address] MAIN OR;  Service: Podiatry    [place] DILATION/CURETTAGE,DIAGNOSTIC N/A 3/20/2018    Procedure: DILATATION AND CURETTAGE (D&C),HYSTEROSCOPY;  Surgeon: Suzette Pollard MD;  Location:  MAIN OR;  Service: Gynecology Oncology    TUBAL LIGATION      TUBAL REANASTOMOSIS         Meds/Allergies:    Prior to Admission medications    Medication Sig Start Date End Date Taking?  Authorizing Provider   acetaminophen (TYLENOL) 325 mg tablet Take 2 tablets (650 mg total) by mouth every 6 (six) hours as needed for mild pain, headaches or fever 2/6/20  Yes Madison Jaramillo PA-C   ascorbic acid (VITAMIN C) 500 mg tablet Take 500 mg by mouth daily   Yes Historical Provider, MD   Calcium Carbonate-Vit D-Min (CALCIUM 600+D PLUS MINERALS) 600-400 MG-UNIT TABS Take by mouth   Yes Historical Provider, MD   docusate sodium (COLACE) 100 mg capsule Take 1 capsule (100 mg total) by mouth 2 (two) times a day 2/6/20  Yes Madison Jaramillo PA-C   hydrochlorothiazide (HYDRODIURIL) 50 mg tablet Take 50 mg by mouth daily   1/2/18 2/6/20 Yes Historical Provider, MD   Multiple Vitamins-Minerals (MULTIVITAMIN ADULT PO) Take 1 tablet by mouth 5/5/08  Yes Historical Provider, MD   QUEtiapine (SEROquel) 25 mg tablet Take 1 tablet (25 mg total) by mouth daily at bedtime 2/6/20  Yes Madison Jaramillo PA-C   warfarin (COUMADIN) 10 mg tablet Take 2 tablets (20 mg total) by mouth see administration instructions Give Tuesday and Thursday 2/6/20  Yes Madison Jaramillo PA-C   warfarin (COUMADIN) 7 5 mg tablet Take 2 tablets (15 mg total) by mouth see administration instructions Give M/W/F/Sat/Sun 2/6/20  Yes Madison Jaramillo PA-C   zinc gluconate 50 mg tablet Take 50 mg by mouth daily   Yes Historical Provider, MD   insulin lispro (HumaLOG) 100 units/mL injection Inject 2-12 Units under the skin 3 (three) times a day before meals 2/6/20   Jennifer Kaur PA-C   polyethylene glycol (MIRALAX) 17 g packet Take 17 g by mouth daily as needed (constipation) 2/6/20   Jennifer Kaur PA-C   sodium chloride (OCEAN) 0 65 % nasal spray 2 sprays into each nostril as needed for congestion 2/6/20   Jennifer Kaur PA-C   traMADol (ULTRAM) 50 mg tablet Take 1 tablet (50 mg total) by mouth every 6 (six) hours as needed for moderate pain for up to 10 days 2/6/20 2/16/20  Jennifer Kaur PA-C   Krill Oil 500 MG CAPS Take by mouth daily  2/6/20  Historical Provider, MD   metFORMIN (GLUCOPHAGE) 500 mg tablet Take 500 mg by mouth 2 (two) times a day with meals   1/2/18 2/6/20  Historical Provider, MD   POTASSIUM PO Take by mouth daily Dosage unknown to patient  2/6/20  Historical Provider, MD     I have reviewed home medications with patient personally  Allergies:    Allergies   Allergen Reactions    Erythromycin Base GI Intolerance    Lisinopril Cough       Social History:     Marital Status:    Occupation: nurse  Patient Pre-hospital Living Situation: home  Patient Pre-hospital Level of Mobility: limited due to recent left foot surgery  Patient Pre-hospital Diet Restrictions: diebetic  Substance Use History:   Social History     Substance and Sexual Activity   Alcohol Use Yes    Alcohol/week: 8 0 standard drinks    Types: 7 Glasses of wine, 1 Standard drinks or equivalent per week     Social History     Tobacco Use   Smoking Status Former Smoker    Types: Cigarettes   Smokeless Tobacco Never Used   Tobacco Comment    QUIT 2010, socially - not for 2 weeks     Social History     Substance and Sexual Activity   Drug Use No       Family History:    Family History   Problem Relation Age of Onset    Cancer Maternal Aunt         BLADDER    Colon polyps Family     Glaucoma Father     Peripheral vascular disease Father        Physical Exam:     Vitals:   Blood Pressure: 155/80 (02/06/20 1228)  Pulse: 102 (02/06/20 1228)  Temperature: (!) 97 °F (36 1 °C) (02/06/20 1212)  Temp Source: Temporal (02/06/20 1212)  Respirations: 19 (02/06/20 1228)  Height: 5' 8" (172 7 cm) (02/06/20 1228)  Weight - Scale: (!) 150 kg (330 lb 11 oz) (02/06/20 1212)  SpO2: 95 % (02/06/20 1228)    Physical Exam   Constitutional: She appears well-developed and well-nourished  HENT:   Head: Normocephalic and atraumatic  Right Ear: External ear normal    Left Ear: External ear normal    Nose: Nose normal    Mouth/Throat: Oropharynx is clear and moist    Eyes: Conjunctivae and EOM are normal    Neck: Normal range of motion  Neck supple  Cardiovascular: Normal rate, regular rhythm and normal heart sounds  Pulmonary/Chest: Effort normal and breath sounds normal    Abdominal: Soft  Bowel sounds are normal    Genitourinary:   Genitourinary Comments: deferred   Musculoskeletal:   Left leg cam boot in place   Neurological: She is alert  Cranial nerve 2 -12 are normal    Skin: Skin is warm and dry  Psychiatric: She has a normal mood and affect  Additional Data:     Lab Results: I have personally reviewed pertinent reports        Results from last 7 days   Lab Units 02/02/20  0451   WBC Thousand/uL 9 44   HEMOGLOBIN g/dL 14 8   HEMATOCRIT % 47 3*   PLATELETS Thousands/uL 197     Results from last 7 days   Lab Units 02/02/20  0451   SODIUM mmol/L 136   POTASSIUM mmol/L 4 2   CHLORIDE mmol/L 103   CO2 mmol/L 29   BUN mg/dL 15   CREATININE mg/dL 0 70   ANION GAP mmol/L 4   CALCIUM mg/dL 9 3   GLUCOSE RANDOM mg/dL 123     Results from last 7 days   Lab Units 02/06/20  0611   INR  1 84*     Results from last 7 days   Lab Units 02/06/20  1046 02/06/20  0620 02/05/20  2049 02/05/20  1626 02/05/20  1052 02/05/20  0645 02/04/20  2114 02/04/20  1608 02/04/20  1030 02/04/20  0654 02/03/20 2056 02/03/20  1610   POC GLUCOSE mg/dl 94 115 161* 98 141* 122 155* 108 128 126 119 114               Imaging: I have personally reviewed pertinent reports  No orders to display       EKG, Pathology, and Other Studies Reviewed on Admission:   · EKG: reviewed    Allscripts / Epic Records Reviewed: Yes     ** Please Note: This note has been constructed using a voice recognition system   **

## 2020-02-06 NOTE — PLAN OF CARE
Problem: Potential for Falls  Goal: Patient will remain free of falls  Description  INTERVENTIONS:  - Assess patient frequently for physical needs  -  Identify cognitive and physical deficits and behaviors that affect risk of falls    -  [place] fall precautions as indicated by assessment   - Educate patient/family on patient safety including physical limitations  - Instruct patient to call for assistance with activity based on assessment  - Modify environment to reduce risk of injury  - Consider OT/PT consult to assist with strengthening/mobility  Outcome: Progressing     Problem: Prexisting or High Potential for Compromised Skin Integrity  Goal: Skin integrity is maintained or improved  Description  INTERVENTIONS:  - Identify patients at risk for skin breakdown  - Assess and monitor skin integrity  - Assess and monitor nutrition and hydration status  - Monitor labs   - Assess for incontinence   - Turn and reposition patient  - Assist with mobility/ambulation  - Relieve pressure over bony prominences  - Avoid friction and shearing  - Provide appropriate hygiene as needed including keeping skin clean and dry  - Evaluate need for skin moisturizer/barrier cream  - Collaborate with interdisciplinary team   - Patient/family teaching  - Consider wound care consult   Outcome: Progressing     Problem: PAIN - ADULT  Goal: Verbalizes/displays adequate comfort level or baseline comfort level  Description  Interventions:  - Encourage patient to monitor pain and request assistance  - Assess pain using appropriate pain scale  - Administer analgesics based on type and severity of pain and evaluate response  - Implement non-pharmacological measures as appropriate and evaluate response  - Consider cultural and social influences on pain and pain management  - Notify physician/advanced practitioner if interventions unsuccessful or patient reports new pain  Outcome: Progressing     Problem: INFECTION - ADULT  Goal: Absence or prevention of progression during hospitalization  Description  INTERVENTIONS:  - Assess and monitor for signs and symptoms of infection  - Monitor lab/diagnostic results  - Monitor all insertion sites, i e  indwelling lines, tubes, and drains  - Monitor endotracheal if appropriate and nasal secretions for changes in amount and color  - [place] appropriate cooling/warming therapies per order  - Administer medications as ordered  - Instruct and encourage patient and family to use good hand hygiene technique  - Identify and instruct in appropriate isolation precautions for identified infection/condition  Outcome: Progressing     Problem: SAFETY ADULT  Goal: Maintain or return to baseline ADL function  Description  INTERVENTIONS:  -  Assess patient's ability to carry out ADLs; assess patient's baseline for ADL function and identify physical deficits which impact ability to perform ADLs (bathing, care of mouth/teeth, toileting, grooming, dressing, etc )  - Assess/evaluate cause of self-care deficits   - Assess range of motion  - Assess patient's mobility; develop plan if impaired  - Assess patient's need for assistive devices and provide as appropriate  - Encourage maximum independence but intervene and supervise when necessary  - Involve family in performance of ADLs  - Assess for home care needs following discharge   - Consider OT consult to assist with ADL evaluation and planning for discharge  - Provide patient education as appropriate  Outcome: Progressing  Goal: Maintain or return mobility status to optimal level  Description  INTERVENTIONS:  - Assess patient's baseline mobility status (ambulation, transfers, stairs, etc )    - Identify cognitive and physical deficits and behaviors that affect mobility  - Identify mobility aids required to assist with transfers and/or ambulation (gait belt, sit-to-stand, lift, walker, cane, etc )  - [place] fall precautions as indicated by assessment  - Record patient progress and toleration of activity level on Mobility SBAR; progress patient to next Phase/Stage  - Instruct patient to call for assistance with activity based on assessment  - Consider rehabilitation consult to assist with strengthening/weightbearing, etc   Outcome: Progressing     Problem: DISCHARGE PLANNING  Goal: Discharge to home or other facility with appropriate resources  Description  INTERVENTIONS:  - Identify barriers to discharge w/patient and caregiver  - Arrange for needed discharge resources and transportation as appropriate  - Identify discharge learning needs (meds, wound care, etc )  - Arrange for interpretive services to assist at discharge as needed  - Refer to Case Management Department for coordinating discharge planning if the patient needs post-hospital services based on physician/advanced practitioner order or complex needs related to functional status, cognitive ability, or social support system  Outcome: Progressing     Problem: Knowledge Deficit  Goal: Patient/family/caregiver demonstrates understanding of disease process, treatment plan, medications, and discharge instructions  Description  Complete learning assessment and assess knowledge base    Interventions:  - Provide teaching at level of understanding  - Provide teaching via preferred learning methods  Outcome: Progressing     Problem: CARDIOVASCULAR - ADULT  Goal: Maintains optimal cardiac output and hemodynamic stability  Description  INTERVENTIONS:  - Monitor I/O, vital signs and rhythm  - Monitor for S/S and trends of decreased cardiac output  - Administer and titrate ordered vasoactive medications to optimize hemodynamic stability  - Assess quality of pulses, skin color and temperature  - Assess for signs of decreased coronary artery perfusion  - Instruct patient to report change in severity of symptoms  Outcome: Progressing  Goal: Absence of cardiac dysrhythmias or at baseline rhythm  Description  INTERVENTIONS:  - Continuous cardiac monitoring, vital signs, obtain 12 lead EKG if ordered  - Administer antiarrhythmic and heart rate control medications as ordered  - Monitor electrolytes and administer replacement therapy as ordered  Outcome: Progressing     Problem: RESPIRATORY - ADULT  Goal: Achieves optimal ventilation and oxygenation  Description  INTERVENTIONS:  - Assess for changes in respiratory status  - Assess for changes in mentation and behavior  - Position to facilitate oxygenation and minimize respiratory effort  - Oxygen administered by appropriate delivery if ordered  - Initiate smoking cessation education as indicated  - Encourage broncho-pulmonary hygiene including cough, deep breathe, Incentive Spirometry  - Assess the need for suctioning and aspirate as needed  - Assess and instruct to report SOB or any respiratory difficulty  - Respiratory Therapy support as indicated  Outcome: Progressing     Problem: Nutrition/Hydration-ADULT  Goal: Nutrient/Hydration intake appropriate for improving, restoring or maintaining nutritional needs  Description  Monitor and assess patient's nutrition/hydration status for malnutrition  Collaborate with interdisciplinary team and initiate plan and interventions as ordered  Monitor patient's weight and dietary intake as ordered or per policy  Utilize nutrition screening tool and intervene as necessary  Determine patient's food preferences and provide high-protein, high-caloric foods as appropriate       INTERVENTIONS:  - Monitor oral intake, urinary output, labs, and treatment plans  - Assess nutrition and hydration status and recommend course of action  - Evaluate amount of meals eaten  - Assist patient with eating if necessary   - Allow adequate time for meals  - Recommend/ encourage appropriate diets, oral nutritional supplements, and vitamin/mineral supplements  - Order, calculate, and assess calorie counts as needed  - Recommend, monitor, and adjust tube feedings and TPN/PPN based on assessed needs  - Assess need for intravenous fluids  - Provide specific nutrition/hydration education as appropriate  - Include patient/family/caregiver in decisions related to nutrition  Outcome: Progressing     Problem: GENITOURINARY - ADULT  Goal: Maintains or returns to baseline urinary function  Description  INTERVENTIONS:  - Assess urinary function  - Encourage oral fluids to ensure adequate hydration if ordered  - Administer IV fluids as ordered to ensure adequate hydration  - Administer ordered medications as needed  - Offer frequent toileting  - Follow urinary retention protocol if ordered  Outcome: Progressing

## 2020-02-06 NOTE — PLAN OF CARE
Problem: OCCUPATIONAL THERAPY ADULT  Goal: Performs self-care activities at highest level of function for planned discharge setting  See evaluation for individualized goals  Description  Treatment Interventions: ADL retraining, Functional transfer training, UE strengthening/ROM, Endurance training, Patient/family training, Equipment evaluation/education, Neuromuscular reeducation, Compensatory technique education, Energy conservation, Activityengagement          See flowsheet documentation for full assessment, interventions and recommendations  Outcome: Progressing  Note:   Limitation: Decreased ADL status, Decreased UE ROM, Decreased UE strength, Decreased Safe judgement during ADL, Decreased endurance, Decreased self-care trans, Decreased high-level ADLs  Prognosis: Good  Assessment: Pt is a 62 y o  female seen for OT evaluation s/p admit to [facility] on 2/6/2020 w/ Acute pulmonary embolism with acute cor pulmonale (HCC) -- s/p PE lysis  Recent fall causing dislocation of tarsometatarsal join of L foot, s/p ORIF on 12/16/19  WBAT LLE as per ortho orders-- CAM Boot  OT completed an extensive review of pt's medical and social history  As per chart review, result of  LLE doppler shows "Acute DVT  noted in the distal femoral vein, popliteal vein, and in the visualized portions of the posterior tibial and peroneal veins "  Comorbidities affecting pt's functional performance at time of assessment include: DM2, HTN, Obesity, s/p L foot surgery, Cervical stenosis, Acute respiratory failure with hypoxia, DVT  See additional PMH in pt chart  Personal factors affecting pt at time of IE include:  steps to enter environment, limited home support, behavioral pattern, difficulty performing ADLS, difficulty performing IADLS , health management  and environment  As per pt report, pta living alone in a 1 level condo with 1 LOREN at doorway -- 15+ steps (R ascending HR) from parking lot + walkway to front door   Reports steps are wide/deep enough for RW placement  Ind with all ADL's, IADL's, and mobility without AD prior to foot surgery -- post op utilized knee scooter (accessible in home)  (+) drives however unable d/t environment barriers to parking lot  Sleeps in standard bed  Limited home support  Works full-time from home  Pt currently  requires S UB ADLs, Mod A LB ADLs, Min A toileting, and c/S mobility with RW 2* the following deficits impacting occupational performance  Pt presents to OT below baseline due to the following performance deficits: weakness, decreased ROM, decreased strength, decreased balance, decreased tolerance, decreased safety awareness, increased pain and orthopedic restrictions  Pt to benefit from continued skilled OT services while in the hospital to address deficits as defined above and maximize level of functional independence w ADL's and functional mobility  Occupational performance areas to address include: grooming, bathing/shower, toilet hygiene, dressing, medication management, socialization, health maintenance, functional mobility, community mobility, clothing management, cleaning, meal prep, household maintenance and social participation  Pt with score of 65/100 on the Barthel Index   Based on OT evaluation, assessment(s), performance deficits listed, and current level of function, pt identified as a HIGH complexity evaluat     OT Discharge Recommendation: Home OT  OT - OK to Discharge: No

## 2020-02-06 NOTE — SOCIAL WORK
To: Prior Authorizations  Ref: Kathy Dan      VANGIE sent authorization to [facility] on 2/4/20 requesting skilled rehab for their member  CM contacted insurance company again this morning to inquire about status of insurance authorization  Cm informed by Brittni Villalta, that determination was sent to a fax number of [phone]  Cm informed Brittni Villalta that is not the fax number for the CM, and provided her with the correct fax number of [phone]  Brittni Villalta to make adjustment  Cm advised to send note that patient will dc from hospital today, 2/6/20 to assist with the adjustment of the start date  Cm to fax this note to [phone]  Thank you,   Roscoe Esparza MSW  Inpatient

## 2020-02-07 LAB
APTT PPP: 35 SECONDS (ref 25–32)
INR PPP: 2.28 (ref 0.91–1.09)
PROTHROMBIN TIME: 24.9 SECONDS (ref 9.8–12)

## 2020-02-07 PROCEDURE — 85610 PROTHROMBIN TIME: CPT | Performed by: FAMILY MEDICINE

## 2020-02-07 PROCEDURE — 97530 THERAPEUTIC ACTIVITIES: CPT

## 2020-02-07 PROCEDURE — 97116 GAIT TRAINING THERAPY: CPT

## 2020-02-07 PROCEDURE — 97163 PT EVAL HIGH COMPLEX 45 MIN: CPT

## 2020-02-07 PROCEDURE — 85730 THROMBOPLASTIN TIME PARTIAL: CPT | Performed by: FAMILY MEDICINE

## 2020-02-07 PROCEDURE — 97535 SELF CARE MNGMENT TRAINING: CPT

## 2020-02-07 RX ADMIN — HYDROCHLOROTHIAZIDE 50 MG: 25 TABLET ORAL at 09:00

## 2020-02-07 RX ADMIN — DOCUSATE SODIUM 100 MG: 100 CAPSULE, LIQUID FILLED ORAL at 09:00

## 2020-02-07 RX ADMIN — TRAMADOL HYDROCHLORIDE 50 MG: 50 TABLET, FILM COATED ORAL at 20:25

## 2020-02-07 RX ADMIN — DOCUSATE SODIUM 100 MG: 100 CAPSULE, LIQUID FILLED ORAL at 17:36

## 2020-02-07 RX ADMIN — METFORMIN HYDROCHLORIDE 500 MG: 500 TABLET ORAL at 17:36

## 2020-02-07 RX ADMIN — OXYCODONE HYDROCHLORIDE AND ACETAMINOPHEN 500 MG: 500 TABLET ORAL at 09:00

## 2020-02-07 RX ADMIN — WARFARIN SODIUM 15 MG: 5 TABLET ORAL at 17:36

## 2020-02-07 RX ADMIN — Medication 1 TABLET: at 09:00

## 2020-02-07 RX ADMIN — POLYETHYLENE GLYCOL 3350 17 G: 17 POWDER, FOR SOLUTION ORAL at 21:57

## 2020-02-07 RX ADMIN — CALCIUM 1 TABLET: 500 TABLET ORAL at 09:00

## 2020-02-07 RX ADMIN — QUETIAPINE FUMARATE 25 MG: 25 TABLET ORAL at 21:57

## 2020-02-07 NOTE — SOCIAL WORK
LOS: 1, Bundle: No, 30 day readmission:   [facility] to review admission packet and consents  Consents signed  Patient is agreeable to a [facility] with only her  SW scheduled for Monday at 1:30 PM  Team notified  Pt reports she has a POA (son Jossue Garces) and living will  Pt will attempt to e-mail SW copies  Patient was admitted from [facility] with diagnoses of acute pulmonary embolism s/p PE lysis  Started on Heparin drop and transitioned to Coumadin  She is also s/p ORIF on 12/16/19 secondary to dislocation of tarsometatarsal joint  Hx of DM II (used Metformin at home) and hypertension  Pt is interested in using [facility] for RN/PT/OT services at discharge  Prior to hospitalization, pt lived in a ranch style condo with 7 steps down with L HR to enter and 1 small step to enter through door  She was independent with ADLs, drives, and ambulating with a knee scooter or RW  She also own a shower chair  She was able to drive and worked full-time at home as a  for insurance  Per previous SW note, family did have concerns about pt telling the truth about her home set-up  Family is trying to clean up her home while she is in the hospital  Her primary  is her sister Giovanny Mancilla ([phone]) or daughter Yolanda Grant ([phone])  PCP is Dr Jossie Steele and pharmacy is [facility] in [place]  No hx of D&A, HHC services, inpatient MH treatment

## 2020-02-07 NOTE — PLAN OF CARE
Problem: OCCUPATIONAL THERAPY ADULT  Goal: Performs self-care activities at highest level of function for planned discharge setting  See evaluation for individualized goals  Description  Treatment Interventions: ADL retraining, Functional transfer training, UE strengthening/ROM, Endurance training, Patient/family training, Equipment evaluation/education, Neuromuscular reeducation, Compensatory technique education, Energy conservation, Activityengagement          See flowsheet documentation for full assessment, interventions and recommendations      Outcome: Progressing  Note:   Limitation: Decreased ADL status, Decreased UE ROM, Decreased UE strength, Decreased Safe judgement during ADL, Decreased endurance, Decreased self-care trans, Decreased high-level ADLs  Prognosis: Good  Assessment: see note   OT Discharge Recommendation: Home OT  OT - OK to Discharge: No

## 2020-02-07 NOTE — OCCUPATIONAL THERAPY NOTE
Occupational Therapy Treatment Note    Name:  Ammy Barr   MRN:   [id number]  Age:     62 y o    Patient Active Problem List   Diagnosis    Thickened endometrium    PMB (postmenopausal bleeding)    Morbid obesity with BMI of 50 0-59 9, adult ([place] [address] )    Cervical stenosis (uterine cervix)    Postoperative state    Acute pulmonary embolism with acute cor pulmonale (HCC)    Type 2 diabetes mellitus without complication, without long-term current use of insulin ([place] [address] )    Essential hypertension    Morbid obesity due to excess calories ([place] [address] )    Pulmonary hypertension ([place] [address] )    Acute respiratory failure with hypoxia (HCC)    DVT (deep venous thrombosis) ([place] [address] )    Status post left foot surgery    Subtherapeutic anticoagulation     Acute pulmonary embolism ([place] [address] ) [I26 [id number]]      Subjective/Goals: "to get a shower and get CLEAN!"-- "get stronger"    Vitals: stable throughout session     Pain: no pain     Treatment Time: ([id number]) 60 minutes    Cognition: WFL    Precautions: WBAT LLE + CAM boot; O2, fall risk    EVALUATION information:   OT Goal expiration date: 2/20/2020   Treatment day: 2   Discharge recommendation: home therapy   HOME SET UP:  o Patient lives in a Condo with 1 step to enter doorway  o Multiple steps from parking lot to front door of condo (approx 15+)  o Walk in shower with 4-5" threshold +GB in shower and around toilet  o DME:  Kneeling scooter, walker, cane and GB  o NO home support; lives alone  o Full time employment working from home as     Patient education: DEEP BREATHING TECHNIQUES T/O ACTIVITY, SLOWING OF PACE, ENERGY CONSERVATION TECHNIQUES FOR CARRY OVER UPON D/C, CONTINUE PARTICIPATION IN SELF-CARE/MOBILITY [address], ENERGY CONSERVATION  and PAIN MANAGEMENT WITH FUNCTIONAL ACTIVITIES    Additional comments:    Assessment/Additional session details: Patient seen this date for OT with focus on goals as set by Mann [place]  Patient agreeable to skilled OT session with focus on ADL's (bathing, dressing, toileting), Transfers (STS, SPT), Standing tolerance/balance, Strength/ROM/HEP, Tub/shower transfers, Home management, Activity tolerance, Education on safety, fall prevention and energy conservation techniques, Item retrieval/safe transport with DME ed, Bathroom/kitchen/home mobility and Fine motor coordination/hand strength  Barriers to treatment include shortess of breath and social/family support  Patient educated on safe functional transfers techniques, the appropriate use of AE/DME to improve functional performance, and activity modification techniques for energy conservation  Plans for d/c are home alone with home therapy  Patient is making gains toward OT goals with continued OT recommended at this time to max tolerance, safety and function for appropriate d/c planning  At end of session patient remains in room seated at recliner with all needs within reach  Session focused on ADL via shower:  eating (Ind ), oral hygiene (Ind ), toileting (Ind ), bathing (S), UB dressing (Ind ), LB dressing (S), and don/doff footwear (Min/Mod A)-- assist needed with removal of ACE bandage  Handouts provided and reviewed for energy conservation and coordination of breathing with functional activities  Goals STG achieved within 2 weeks Performance at Initial Evaluation 2/6/2020  Current Performance (last date completed)   Grooming while standing at sink MI S 2/7 S/MI with good tolerance and no LOB  2/6 S   ADL transfers  MI c/S 2/7 S/MI + CAM boot and WBAT  Good safety noted and no LOB + RW  2/6 c/S   Bathroom mobility with appropriate AD MI c/S 2/7 S/MI + RW and Boot  2/6 c/S   UB ADL  MI  (2/7) S 2/7 Independent bathing/dressing  2/6 S   LB ADL, AE PRN + CAM BOOT  MI  (2/7) Mod A- No cam boot 2/7 MI, increased time and SOB noted  Ed on energy conservation-- sat on toilet as she would at home      2/6 Mod A - no cam boot    Toileting/clothing management and hygiene MI  (2/7) Min A 2/7 MI CM and hygiene  2/6 Min A   Dynamic standing balance for increased safety when completing purposeful tasks F+  (2/7) F-  2/7 Fair+  2/6 F-   Increase standing tolerance for inc'd safety with standing purposeful tasks 5-8 min   (2/7) 3 min  2/7 4-5 min   2/6 3 min    Participate in therex 1-3x/week for inc'd overall stamina/activity tolerance for purposeful tasks To be completed  (2/7)    2/7 with AM ADL    shower stall transfer 4-5 inch threshold (+) GB's (+) chair  MI CGA  2/7 Supervision + GB  2/6 CGA (+) GBs    Kitchen mobility for inc'd independence and safety negotiating kitchen environment MI Unable to assess 2/6 Unable to assess   Household management (laundry/cleaning) MI Unable to assess 2/6 Unable to assess      Joanne Garcia  2/7/2020

## 2020-02-07 NOTE — PROGRESS NOTES
RECREATIONAL THERAPY PARTICIPATION LOG      ACTIVITY:    GAMES:        BINGO:        MUSIC STIM:        ARTS & CRAFTS:        EXERCISE:        CLUBS & MEETING:        SOCIALS:        SPIRITUAL:        INDEPENDENT: Leisure pursuits, as noted below  1:1:    Resident was seen for a Recreational Therapy greeting and visit in her hospital room  Resident participated in the Preferences for Customary Routine and Activities assessment  Resident shared that she prefers to wear her own clothing during the daytime and our hospital gowns at night  Resident considers it to be somewhat important to choose a shower while she is here in the Transitional Care Unit; she communicated that she was not able to take a shower in seventeen days while a patient in the hospital before being admitted to our Unit  Resident is grateful to be able to shower twice a week now  Resident shared that it is very important to her to have her family involved in discussions about her care while she is here with us  Resident has three sons and she is anticipating family visits on the weekend  Resident's leisure pursuits include: working on her personal laptop computer, and solving Sudoku crossword puzzles  Resident has both her computer laptop and her [employer] book set up on her table tray for easy access  Resident did have the newspaper delivered to her room; however, reading this is not very important to her  When answering Item H of the lower portion of the survey, which is an inquiry about how important it is for her to participate in Adventist services or practices, resident responded that this is not very important to her  Resident explained that she was raised Mandaeism  She considers herself to be very spiritual and she prefers to pray on her own      Resident has been introduced to the Recreational Therapy program, to the possible small group, 1:1 visitation opportunities, and the [employer] Trips, such as spending time outside in a season of good weather and visiting the [place], if she would like to  Resident was offered to have a hospital  visit her; resident declined this invitation  ROSS Aly

## 2020-02-07 NOTE — UTILIZATION REVIEW
Notification of Discharge  This is a Notification of Discharge from our facility [address]  Please be advised that this patient has been discharge from our facility  Below you will find the admission and discharge date and time including the patients disposition  PRESENTATION DATE: 1/22/2020 10:22 PM  OBS ADMISSION DATE:   IP ADMISSION DATE: 1/22/20 2222   DISCHARGE DATE: 2/6/2020 11:46 AM  DISPOSITION: Released to SNF/TCU/SNU Facility Released to SNF/TCU/SNU Facility   Admission Orders listed below:  Admission Orders (From admission, onward)     Ordered        01/22/20 2239  Inpatient Admission  Once                   Please contact the UR Department if additional information is required to close this patient's authorization/case  [address] Utilization Review Department  Main: [phone] x carefully listen to the prompts  All voicemails are confidential   [email or web address]  Send all requests for admission clinical reviews, approved or denied determinations and any other requests to dedicated fax number below belonging to the campus where the patient is receiving treatment   List of dedicated fax numbers:  [address] DENIALS (Administrative/Medical Necessity) [phone]   [address]  (Maternity/NICU/Pediatrics) [phone]   Britney Ngo [phone]   [facility] [phone]   [facility] [phone]   Fredy Braswell [facility] [address] [place] [address] [phone]   [facility]  [phone] [address] [facility] [address]  [address] [facility] [address] [facility] [phone]

## 2020-02-07 NOTE — PLAN OF CARE
Problem: Prexisting or High Potential for Compromised Skin Integrity  Goal: Skin integrity is maintained or improved  Description  INTERVENTIONS:  - Identify patients at risk for skin breakdown  - Assess and monitor skin integrity  - Assess and monitor nutrition and hydration status  - Monitor labs   - Assess for incontinence   - Turn and reposition patient  - Assist with mobility/ambulation  - Relieve pressure over bony prominences  - Avoid friction and shearing  - Provide appropriate hygiene as needed including keeping skin clean and dry  - Evaluate need for skin moisturizer/barrier cream  - Collaborate with interdisciplinary team   - Patient/family teaching  - Consider wound care consult   Outcome: Progressing     Problem: Potential for Falls  Goal: Patient will remain free of falls  Description  INTERVENTIONS:  - Assess patient frequently for physical needs  -  Identify cognitive and physical deficits and behaviors that affect risk of falls    -  [place] fall precautions as indicated by assessment   - Educate patient/family on patient safety including physical limitations  - Instruct patient to call for assistance with activity based on assessment  - Modify environment to reduce risk of injury  - Consider OT/PT consult to assist with strengthening/mobility  Outcome: Progressing

## 2020-02-07 NOTE — PHYSICAL THERAPY NOTE
Physical Therapy Evaluation: 25 minutes (10:43 to 11:08)    Patient's Name: Patrica Lopez    Admitting Diagnosis  Acute pulmonary embolism ([facility] ) [I26 99]    Problem List  Patient Active Problem List   Diagnosis    Thickened endometrium    PMB (postmenopausal bleeding)    Morbid obesity with BMI of 50 0-59 9, adult ([facility] )    Cervical stenosis (uterine cervix)    Postoperative state    Acute pulmonary embolism with acute cor pulmonale (HCC)    Type 2 diabetes mellitus without complication, without long-term current use of insulin ([facility] )    Essential hypertension    Morbid obesity due to excess calories ([facility] )    Pulmonary hypertension ([facility] )    Acute respiratory failure with hypoxia ([place] [address] )    DVT (deep venous thrombosis) ([place] [address] )    Status post left foot surgery    Subtherapeutic anticoagulation       Past Medical History  Past Medical History:   Diagnosis Date    Colon polyp     Diabetes mellitus ([facility] )     pre    Hypertension     Muscle weakness     elev enzymes    Seasonal allergies        Past Surgical History  Past Surgical History:   Procedure Laterality Date    APPENDECTOMY      AUGMENTATION BREAST      CERVICAL BIOPSY  W/ LOOP ELECTRODE EXCISION      COLONOSCOPY  11/2019    DILATION AND CURETTAGE OF UTERUS      HYSTEROSCOPY N/A 3/20/2018    Procedure: HYSTEROSCOPY;  Surgeon: Fermin Sahu MD;  Location:  MAIN OR;  Service: Gynecology Oncology    IR PULMONARY LYSIS  1/23/2020    ORIF FOOT FRACTURE Left 12/16/2019    Procedure: ORIF LISFRANC;  Surgeon: Mary Friedman DPM;  Location: [address] [place] [address] MAIN OR;  Service: Podiatry    NC DILATION/CURETTAGE,DIAGNOSTIC N/A 3/20/2018    Procedure: DILATATION AND CURETTAGE (D&C),HYSTEROSCOPY;  Surgeon: Fermin Sahu MD;  Location:  MAIN OR;  Service: Gynecology Oncology    TUBAL LIGATION      TUBAL REANASTOMOSIS            02/07/20 1043   Note Type   Note type Eval/Treat   Pain Assessment   Pain Assessment 0-10   Pain Score No Pain   Home Living Type of Home Other (Comment)  ([place]do in [facility])   Home Layout One level;Stairs to enter with rails; Other (Comment); Able to live on main level with bedroom/bathroom; Performs ADLs on one level  (See additional comments below for details)   Bathroom Shower/Tub Walk-in shower   Bathroom Toilet   (4 to 5 inch threshold)   Bathroom Equipment Grab bars in shower; Shower chair;Grab bars around toilet   [address] cane;Crutches; Wheelchair-manual  (RW, kneeling scooter)   Additional Comments Pt initially stated that she had 20 outdoor steps, that are wide and deep enough to accommodate a RW, with one handrail ( unsure as to what side the rail was one  This PT requested pt have someone take pictures of outdoor steps for visualization, to allow for a virtual home eval  Pt's son took pictures of the steps and pt actually has 7 steps with left handrail down,  to access her condo  It appears as if only 2 or 3 steps may actually accommodate a RW  Prior Function   Level of [place] Independent with ADLs and functional mobility   Lives With Alone   Receives Help From   (No home support -: family doen't live nearby)   ADL Assistance Independent   IADLs Independent   Falls in the last 6 months   (Only fall leading to 12/19 hospitalization)   Vocational Full time employment  ( (Works from [employer] ))   Comments Prior to 1/21/20 hospitalization, pt was NWB thru LLE and mobilized with knee scooter, short household distances MOD I  Pt was unable to drive or perform elevations at this time  Family was driving over pt's grass,  up to her door to pick her up  Pt's home association was complaining that the cars were ripping up the grass and that she needed to do the steps instead  At baseline, prior to 12/16/19 hospitalization for left ankle surgery, pt was Independent with all mobility and ambulated community distances with no AD;  and was a +   Restrictions/Precautions   Weight Bearing Precautions Per Order Yes   LLE Weight Bearing Per Order WBAT   Braces or Orthoses CAM Boot   Other Precautions WBS; Visual impairment; Fall Risk;Hard of hearing;Cognitive  (Reading glasses, left foot discomfort)   General   Family/Caregiver Present No   Cognition   Overall Cognitive Status WFL   Arousal/Participation Alert   Attention Within functional limits   Orientation Level Oriented X4   Memory Within functional limits;Decreased recall of biographical information;Decreased recall of precautions;Decreased short term memory   Following Commands Follows all commands and directions without difficulty   Light Touch   RLE Light Touch Grossly intact   LLE Light Touch Not tested  (Foot in CAM boot)   Bed Mobility   Rolling R 7  Independent  (Decreased time and effort)   Rolling L 7  Independent  (Decreased time and effort)   Supine to Sit 5  Supervision  (Close to EOB, + fall risk)   Additional items Increased time required   Sit to Supine 5  Supervision  (Close to EOB, + fall risk)   Additional items Increased time required   Additional Comments   (Bed Mobility: HOB flat, no rails)   Transfers   Sit to Stand 5  Supervision   Additional items Increased time required  (Cues for hand placement)   Stand to Sit 5  Supervision   Additional items Increased time required  (Cues for hand placement; uncontrolled descents)   Stand pivot 5  Supervision  (SPT with RW)   Additional items Increased time required  (Cues for RW management; occ abandons the RW)   Toilet transfer Unable to assess  (Pt did not need to use the bathromm)   Ambulation/Elevation   Gait pattern Improper Weight shift;Decreased foot clearance;Decreased L stance; Excessively slow; Short stride  (Appears to be limping ( antagia vs LLD from CAM boot))   Gait Assistance 5  Supervision   Additional items   (Slight SOB on exertion)   Assistive Device Rolling walker   Distance 350 feet  (Negotiated multiple turns/obstacles in pathway)   Stair Management Assistance Not tested  (Fearful of attempting steps with folded RW)   Additional items   (Reported increased fatigue after elevation training on curb )   Curbs 1 + 1 curb step with RW and CG A  (Cues for RW management and sequencing)   Balance   Static Sitting Good   Dynamic Sitting Fair +   Static Standing   (Fair/Fair + with RW)   Dynamic Standing   (Fair with RW)   Ambulatory   ([address] with RW)   Endurance Deficit   Endurance Deficit Yes  (Decreased endurance for activity)   Activity Tolerance   Activity Tolerance Patient tolerated treatment well;Patient limited by pain  (Patient limited by left foot discomfort)   Assessment   Prognosis Good   Problem List Decreased strength;Decreased range of motion;Decreased endurance; Impaired balance;Decreased mobility; Decreased cognition; Impaired judgement;Decreased safety awareness; Impaired vision; Impaired hearing;Obesity; Decreased skin integrity;Orthopedic restrictions  (Reports Left ankle discomfort )   Assessment Pt is a 62 y o  female s/p admit to [address] [place] [address] on 2/6/2020 w/ Acute pulmonary embolism with acute cor pulmonale (HCC) -- s/p PE lysis  Recent fall causing dislocation of tarsometatarsal joint of L foot -> underwent [place] ORIF on 12/16/19; WBAT LLE as per ortho orders-- CAM Boot  Pt was transferred to [address] [place] [address] (TCF Unit) for rehab on 2/6/20  PT completed an extensive review of pt's medical and social history  As per chart review, result of  1/22/20 LLE doppler shows "Acute deep vein thrombosis is noted in the distal femoral vein, popliteal vein, and in the visualized portions of the posterior tibial and peroneal veins   No evidence of superficial thrombophlebitis noted "  Comorbidities affecting pt's functional performance at time of assessment include: DM2 ( pt reports that she does not have this condition, states having " pre-diabetes"), HTN, Morbid obesity, s/p L foot surgery, Cervical stenosis, Acute respiratory failure with hypoxia, pulmonary hypertension, COPD, DVT, PE caused from recent surgery 12/16/2019 for left ankle, and immobility  In summary, guiding factors including patient history, examination of body sytem(s), clinical presentation and clinical decision making were considered  Pt presents with comorbid conditions that impact function, comorbid conditions that may limit ability to progress, context of current functional limitations as compared to the prior level of function, impaired prior level of function, limited physical/social support, participation restrictions, recent hospitalization and ER visits, and with living environment deficits  Pt also presents with impaired: cognition,  safety, skin condition, vision, hearing (wants to make an appointment with an audiologist), vitals, BLE MMT strength, functional strength, endurance for activity, sit and stand balance, bed mobility, transfers, and gait abilities  Pt reported no pain during PT Eval; but stated that she had 5/10 " discomfort"  in her left foot s/p elevation training on curb steps  + SOB and increased fatigue with exertional activities  Clinical presentation is with unstable and unpredictable characteristics  The assigned level of complexity is: High  Pt will benefit from skilled PT tx intervention to maximize safe mobility in prep for discharge  Barriers to Discharge Decreased caregiver support; Inaccessible home environment  (LLE WBAT with CAM boot )   Barriers to Discharge Comments Below functional baseline   Goals   STG Expiration Date 02/19/20   Short Term Goal #1 See grid   PT Treatment Day 1   Plan   Treatment/Interventions ADL retraining;Functional transfer training;LE strengthening/ROM; Elevations; Therapeutic exercise; Endurance training;Cognitive reorientation;Patient/family training;Equipment eval/education; Bed mobility;Gait training; Compensatory technique education;Continued evaluation;Spoke to nursing;Spoke to MD;Spoke to advanced practitioner;Spoke to case management;OT;Family   PT Frequency   (5 to 7x/week)   Recommendation   Equipment Recommended   (Appears to have all necessary DME)   Barthel Index   Feeding 10   Bathing 0   Grooming Score 5   Dressing Score 5   Bladder Score 10   Bowels Score 10   Toilet Use Score 5   Transfers (Bed/Chair) Score 10   Mobility (Level Surface) Score 0   Stairs Score 5  (On 1 + 1 curb steps with RW)   Barthel Index Score 60           Goals   STG achieved within 1 to 1 5 weeks Performance at Initial Evaluation (2/7/20) Current Performance (last date completed)   Supine to sit transitions to increase ease of transfer and allow pt to get out of bed,   MOD I Supervision  HOB flat, no rails 2/7/20   Sit to supine transitions to increase ease of transfer and allow pt to get back into bed MOD I Supervision  HOB flat, no rails 2/7/20   Functional transfers to facilitate safe return to previous living environment  MOD I Supervision  Sit <->stand transfers and SPT's with RW 2/7/20   Ambulation with RW > or = 350 feet for safe household and community distance ambulation   MOD I Ambulated with a RW for 350 feet with Supervision 2/7/20   Ascend/descend 7 steps with right handrail up, with folded RW ( or most appropriate method ( to allow pt to safely enter/exit home )   MOD I Not performed   Not performed   Ascend/descend a curb step, with RW, ( to allow for safe community re-entry) MOD I 1 + 1 curb steps with RW and CG A 2/7/20   Improve standing functional balance to allow for pt to  object from floor    MOD I Not performed Not performed                   Vitals  Seated at rest: /80, HR 93, SPO2 (RA) 94 %  After ambulating with a RW for 350 feet (seated):  /94, HR 96,  SPO2 (RA) 95 %      BLE MMT Strength  Right hip flexion: 3+/5  Left hip flexion ( tested in stand): 3-/5  Right knee /: 4/5  Left knee /: At least 3/5 ( resistance not applied; due to recent ankle surgery)  Right ankle DF: 4-/5  Left ankle DF: NT ( Ankle in CAM boot)  Right ankle PF: 4-/5  Left ankle PF: NT ( Ankle in CAM boot)           Lois Metzger, PT       ______________________________________________________________       PHYSICAL THERAPY TREATMENT NOTE     Time In:  11:08   Time Out: 11:53  Total Time:  45 minutes            S: " I get confused with how may steps I have and what side the rail is on  Aiyana Console Aiyana Console It feels like I have 20 steps"      O:   Vitals: After negotiating 4 curb steps with a RW (seated):  /79, HR 95,  SPO2 (RA) 92 %      Patient Education:  Educated pt on benefits of mobility, risks of immobility, importance of proper breathing mechanism and not holding breath during activities; differences between PT/OT, functional mobility training with a RW ( during transfers and gait), using appropriate hand placement with sit <->stand transfers and proper RW management with SPT's  During sit <->stand transfers,  pt attempts to pull to stand from RW; then keeps hands on RW when sitting  Discussed importance of controlling decent during stand to sit transfers to decrease risk of spinal compression fractures  Pt occasionally steps outside RW or pushes RW too far forward during transfers and gait and occassionally abandons RW during SPT's  Reviewed use of call bell for assistance, POC, and  various methods to carry folded RW up/down steps  in prep for return to home ( pt will have no one to carry RW up/down outdoor steps for her)  Pt reported being  "very leery" about negotiating steps with a folded RW  Transfers ( EOB, recliner chair): sit <->stand with S, SPT with RW and S    Gait: Ambulated with a RW for 110 feet, 8 feet x 2 with S ( see PT Fabian above for gait deviations)    Elevations: Pt negotiated 4 curb steps with a RW and CG A  Pt very fatigued and declined elevation training with folded RW on steps  Pt agreeable to attempting this during tomorrow's PT tx session  Balance: Please refer to JOSE Peralta for details on sit and stand balance      At end of session pt remained in recliner chair w/o issues  Call bell and phone within reach  A: Pt seen for physical therapy treatment consisting of pt education,  transfer and gait training at bedside  Presents with generalized deconditioning s/p recent hospital stay  Pt with increased SOB with exertional activities ( SPO2 levels stable)  Pt assessed to have elevated BP s/p elevation training on curb steps  Patient receptive to education and is looking forward continued rehab  P: Continue skilled PT tx, as per POC in prep for return to home alone        Gracy Briceno, PT

## 2020-02-08 LAB
INR PPP: 3.1 (ref 0.91–1.09)
PROTHROMBIN TIME: 33.3 SECONDS (ref 9.8–12)

## 2020-02-08 PROCEDURE — 97530 THERAPEUTIC ACTIVITIES: CPT

## 2020-02-08 PROCEDURE — 97535 SELF CARE MNGMENT TRAINING: CPT

## 2020-02-08 PROCEDURE — 85610 PROTHROMBIN TIME: CPT | Performed by: FAMILY MEDICINE

## 2020-02-08 PROCEDURE — 97116 GAIT TRAINING THERAPY: CPT

## 2020-02-08 RX ORDER — WARFARIN SODIUM 10 MG/1
10 TABLET ORAL
Status: DISCONTINUED | OUTPATIENT
Start: 2020-02-08 | End: 2020-02-09

## 2020-02-08 RX ORDER — WARFARIN SODIUM 5 MG/1
15 TABLET ORAL
Status: DISCONTINUED | OUTPATIENT
Start: 2020-02-11 | End: 2020-02-09

## 2020-02-08 RX ADMIN — Medication 1 TABLET: at 08:06

## 2020-02-08 RX ADMIN — METFORMIN HYDROCHLORIDE 500 MG: 500 TABLET ORAL at 08:06

## 2020-02-08 RX ADMIN — OXYCODONE HYDROCHLORIDE AND ACETAMINOPHEN 500 MG: 500 TABLET ORAL at 08:06

## 2020-02-08 RX ADMIN — DOCUSATE SODIUM 100 MG: 100 CAPSULE, LIQUID FILLED ORAL at 17:12

## 2020-02-08 RX ADMIN — CALCIUM 1 TABLET: 500 TABLET ORAL at 08:07

## 2020-02-08 RX ADMIN — QUETIAPINE FUMARATE 25 MG: 25 TABLET ORAL at 21:29

## 2020-02-08 RX ADMIN — HYDROCHLOROTHIAZIDE 50 MG: 25 TABLET ORAL at 08:06

## 2020-02-08 RX ADMIN — DOCUSATE SODIUM 100 MG: 100 CAPSULE, LIQUID FILLED ORAL at 08:06

## 2020-02-08 RX ADMIN — METFORMIN HYDROCHLORIDE 500 MG: 500 TABLET ORAL at 17:12

## 2020-02-08 RX ADMIN — WARFARIN SODIUM 10 MG: 5 TABLET ORAL at 17:12

## 2020-02-08 NOTE — PHYSICAL THERAPY NOTE
Physical Therapy Daily Treatment Note    Patient's Name: Bimal Damian    Admitting Diagnosis  Acute pulmonary embolism ([facility] ) [I26 99]    Problem List  Patient Active Problem List   Diagnosis    Thickened endometrium    PMB (postmenopausal bleeding)    Morbid obesity with BMI of 50 0-59 9, adult ([facility] )    Cervical stenosis (uterine cervix)    Postoperative state    Acute pulmonary embolism with acute cor pulmonale (HCC)    Type 2 diabetes mellitus without complication, without long-term current use of insulin ([facility] )    Essential hypertension    Morbid obesity due to excess calories ([facility] )    Pulmonary hypertension ([facility] )    Acute respiratory failure with hypoxia ([place] [address] )    DVT (deep venous thrombosis) ([place] [address] )    Status post left foot surgery    Subtherapeutic anticoagulation       Past Medical History  Past Medical History:   Diagnosis Date    Colon polyp     Diabetes mellitus ([facility] )     pre    Hypertension     Muscle weakness     elev enzymes    Seasonal allergies        Past Surgical History  Past Surgical History:   Procedure Laterality Date    APPENDECTOMY      AUGMENTATION BREAST      CERVICAL BIOPSY  W/ LOOP ELECTRODE EXCISION      COLONOSCOPY  11/2019    DILATION AND CURETTAGE OF UTERUS      HYSTEROSCOPY N/A 3/20/2018    Procedure: HYSTEROSCOPY;  Surgeon: Eder Salcedo MD;  Location: [facility] MAIN OR;  Service: Gynecology Oncology    IR PULMONARY LYSIS  1/23/2020    ORIF FOOT FRACTURE Left 12/16/2019    Procedure: ORIF LISFRANC;  Surgeon: Dillon Baker DPM;  Location: [address] [place] [address] MAIN OR;  Service: Podiatry    ME DILATION/CURETTAGE,DIAGNOSTIC N/A 3/20/2018    Procedure: DILATATION AND CURETTAGE (D&C),HYSTEROSCOPY;  Surgeon: Eder Salcedo MD;  Location: [facility] MAIN OR;  Service: Gynecology Oncology    TUBAL LIGATION      TUBAL REANASTOMOSIS         Vitals:    02/07/20 2306 02/07/20 2332 02/08/20 0511 02/08/20 0630   BP:  138/76  140/72   BP Location:  Right arm  Right arm   Pulse:  92  85   Resp:  19 16   Temp:  97 6 °F (36 4 °C)  (!) 96 4 °F (35 8 °C)   TempSrc:  Temporal  Temporal   SpO2: 95% 95%  96%   Weight:   (!) 150 kg (331 lb 5 6 oz)    Height:           PT Treatment Time: 39 minutes       02/08/20 1305   Pain Assessment   Pain Assessment 0-10   Pain Score No Pain   Restrictions/Precautions   Weight Bearing Precautions Per Order Yes   LLE Weight Bearing Per Order WBAT   Braces or Orthoses CAM Boot   Other Precautions Fall Risk;Pain;Hard of hearing;Visual impairment   General   Chart Reviewed Yes   Response to Previous Treatment Patient reporting fatigue but able to participate   (soreness in upper thigh groin)   Family/Caregiver Present No   Cognition   Overall Cognitive Status WFL   Arousal/Participation Alert; Cooperative   Attention Within functional limits   Orientation Level Oriented X4   Memory Within functional limits   Following Commands Follows all commands and directions without difficulty   Subjective   Subjective I feel I need the most work on the stairs and getting this boot on   Transfers   Sit to Stand 6  Modified independent   Additional items Increased time required;Armrests   Stand to Sit 6  Modified independent   Additional items Armrests; Increased time required   Stand pivot 6  Modified independent   Additional items Armrests; Increased time required   Additional Comments with RW   Ambulation/Elevation   Gait pattern Improper Weight shift  (slight limp 2* to LLD from CAM Boot)   Gait Assistance 6  Modified independent   Assistive Device Rolling walker   Distance 400ft + multiple shorter distances   Stair Management Assistance 4  Minimal assist   Additional items Assist x 1;Verbal cues; Tactile cues; Increased time required   Stair Management Technique One rail R;Step to pattern; Foreward; With walker  (folded RW; will only need to fold for last 2 steps )   Number of Stairs 2   Curbs Supervision with RW unfolded   Balance   Static Sitting Good   Dynamic Sitting Fair +   Static Standing Fair +   Dynamic Standing Fair   Ambulatory Fair   Endurance Deficit   Endurance Deficit No   Activity Tolerance   Activity Tolerance Patient tolerated treatment well   Nurse Made Aware RN made aware pt now ind in room for ambulation and bathroom privalges   Assessment   Prognosis Good   Problem List Decreased strength;Decreased range of motion;Decreased endurance; Impaired balance;Decreased mobility;Pain;Decreased skin integrity;Orthopedic restrictions   Assessment Pt was seen today for a PT treatment session with focus on stair and gait training  After seeing picture of stairs to pts front door this PT feels that for first 5 she will be able to fit walker onto, for final 2 stairs she will be able to fold walker  Pt did well with negotiating 2 stairs with folded walker with min A  Pt is able to fold and unfold walker in standing without LOB or problems  Practiced donning CAM boot today, pt is able to complete this easiest in standing by opening boot standing and placing foot into boot then sitting to fasten it  She did well with all ambulation completing without LOB, SOB, dizziness or increased pain  Pt is cleared for ambulation in room and bathroom privlages independently  At end of session pt left in recliner with all needs in reach and all questions answered  Barriers to Discharge Inaccessible home environment;Decreased caregiver support   Goals   Patient Goals to get better at stairs   STG Expiration Date 02/19/20   Short Term Goal #1 see grid   PT Treatment Day 2   Plan   Treatment/Interventions Functional transfer training;LE strengthening/ROM; Elevations; Endurance training; Therapeutic exercise;Patient/family training;Equipment eval/education; Bed mobility;Gait training;Spoke to nursing;OT   Progress Progressing toward goals   PT Frequency   (5-7x/wk)   Recommendation   Recommendation Home PT; Home independently   Equipment Recommended Walker   PT - OK to Discharge No     Goals    STG achieved within 1 to 1 5 weeks Performance at Initial Evaluation (2/7/20) Current Performance (last date completed)   Supine to sit transitions to increase ease of transfer and allow pt to get out of bed,    MOD I Supervision  HOB flat, no rails 2/7/20   Sit to supine transitions to increase ease of transfer and allow pt to get back into bed MOD I Supervision  HOB flat, no rails 2/7/20   Functional transfers to facilitate safe return to previous living environment     MOD I Supervision  Sit <->stand transfers and SPT's with RW 2/8/20   Ambulation with RW > or = 350 feet for safe household and community distance ambulation    MOD I Ambulated with a RW for 350 feet with Supervision 2/8/20   Ascend/descend 7 steps with right handrail up, with folded RW ( or most appropriate method ( to allow pt to safely enter/exit home )    MOD I Not performed    2/8/20   Ascend/descend a curb step, with RW, ( to allow for safe community re-entry) MOD I 1 + 1 curb steps with RW and CG A 2/8/20   Improve standing functional balance to allow for pt to  object from floor   MOD I Not performed Not performed       Adriel Delarosa PT, DPT

## 2020-02-08 NOTE — OCCUPATIONAL THERAPY NOTE
Occupational Therapy Treatment Note     Name:  Dhara Camarena   MRN:   [id number]  Age:     62 y o  Patient Active Problem List   Diagnosis    Thickened endometrium    PMB (postmenopausal bleeding)    Morbid obesity with BMI of 50 0-59 9, adult ([place] [address] )    Cervical stenosis (uterine cervix)    Postoperative state    Acute pulmonary embolism with acute cor pulmonale (HCC)    Type 2 diabetes mellitus without complication, without long-term current use of insulin ([place] [address] )    Essential hypertension    Morbid obesity due to excess calories ([place] [address] )    Pulmonary hypertension ([place] [address] )    Acute respiratory failure with hypoxia (HCC)    DVT (deep venous thrombosis) ([place] [address] )    Status post left foot surgery    Subtherapeutic anticoagulation      Acute pulmonary embolism ([place] [address] ) [I26 [id number]]        Subjective/Goals: "I was very tired after yesterday"-- Patient reports that she realized how weak she had gotten over time and states she slept well over evening as a result     Vitals: stable throughout session      Pain: no reports of pain at rest and with mobility however when elevating left LE patient expressed pain to hip/thigh/groin region of 5/10        Treatment Time: (1161-3928) 53 minutes     Cognition: WFL     Precautions: WBAT LLE + CAM boot; O2, fall risk     EVALUATION information:  · OT Goal expiration date: 2/20/2020  · Treatment day: 3  · Discharge recommendation: home therapy  · HOME SET UP:  ? Patient lives in a [address] with 1 step to enter doorway  ? Multiple steps from parking lot to front door of condo (approx 15+)  ? Walk in shower with 4-5" threshold +GB in shower and around toilet  ? DME:  Kneeling scooter, walker, cane and GB  ? NO home support; lives alone  ?  Full time employment working from home as      Patient education: DEEP BREATHING TECHNIQUES T/O ACTIVITY, SLOWING OF PACE, ENERGY CONSERVATION TECHNIQUES FOR CARRY OVER UPON D/C, CONTINUE PARTICIPATION IN SELF-CARE/MOBILITY WITH STAFF WHILE Sofia , OVERALL SAFETY AWARENESS, FALL PREVENTION, ENERGY CONSERVATION  and PAIN MANAGEMENT WITH FUNCTIONAL ACTIVITIES     Additional comments: patient ed on DME for walker sivaning walker tray with plans to order from [facility] for home  Patient states that the walker she received is a walker from [address] [place] [address] therefore feels it to be a drive as we have on unit therefore STEPHENS talked about fit and recommendations on which would be best to order        Assessment/Additional session details: Patient seen this date for OT with focus on goals as set by OTR  Patient agreeable to skilled OT session with focus on ADL's (bathing, dressing, toileting), Transfers (STS, SPT), Standing tolerance/balance, Strength/ROM/HEP, Tub/shower transfers, Home management, Activity tolerance, Education on safety, fall prevention and energy conservation techniques, Item retrieval/safe transport with DME ed, Bathroom/kitchen/home mobility and Fine motor coordination/hand strength  Barriers to treatment include shortess of breath and social/family support  Patient educated on safe functional transfers techniques, the appropriate use of AE/DME to improve functional performance, and activity modification techniques for energy conservation  Plans for d/c are home alone with home therapy  Patient is making gains toward OT goals with continued OT recommended at this time to max tolerance, safety and function for appropriate d/c planning  At end of session patient remains in room seated at recliner with all needs within reach      With functional mobility and ADL function patient doing very well at this time needing assist mainly with management of left CAM boot  Patient is however aware of how to don boot but gets fatigued and presents with some challenge in reaching    STEPHENS discussed some options including sitting on bed with left LE elevated however this would increased pain and cramping to LE therefore attempted to prop leg onto chair which again posed some difficulty needing some min assist   Plans to try a small stool to slightly elevate LE to increase reach and management  Ed on walker positioning and placement recommendations for kitchen and during HM tasks demonstrating good overall understanding and carryover-- would benefit from additional ed/trails and discussion on energy conservation  Patient encouraged to think about her day at home and any challenges she may have so that therapy can aide in any problem solving while she in on unit to max her overall safety and function given limited local family support  Patient states that at this time she feels her only real challenge will be the steps from parking area to her condo  Goals STG achieved within 2 weeks Performance at Initial Evaluation 2/6/2020  Current Performance (last date completed)   Grooming while standing at sink MI  (2/8) S 2/8 MI  2/7 S/MI with good tolerance and no LOB  2/6 S   ADL transfers  MI  (2/8) c/S 2/8 MI STS and SPT  2/7 S/MI + CAM boot and WBAT  Good safety noted and no LOB + RW  2/6 c/S   Bathroom mobility with appropriate AD MI  (2/8) c/S 2/8 MI + RW  2/7 S/MI + RW and Boot  2/6 c/S   UB ADL  MI  (2/7) (2/8) S 2/8 reports completion on own this date with nursing aide confirming   2/7 Independent bathing/dressing  2/6 S   LB ADL, AE PRN + CAM BOOT  MI  (2/7) (2/8) Mod A- No cam boot 2/8 reports completion on own this date except for CAM boot (nursing confirms)-- CAM boot management min assist (see assessment for further details)  2/7 MI, increased time and SOB noted  Ed on energy conservation-- sat on toilet as she would at home      2/6 Mod A - no cam boot    Toileting/clothing management and hygiene MI  (2/7) (2/8) Min A 2/8 MI CM and hygiene  2/7 MI CM and hygiene  2/6 Min A   Dynamic standing balance for increased safety when completing purposeful tasks F+  (2/7) (2/8) F-  2/8 Fair+  2/6 F-   Increase standing tolerance for inc'd safety with standing purposeful tasks 5-8 min   (2/7) (2/8) 3 min  2/8 12 min with activity and mobility   2/7 4-5 min   2/6 3 min    Participate in therex 1-3x/week for inc'd overall stamina/activity tolerance for purposeful tasks To be completed  (2/7)    2/7 with AM ADL    shower stall transfer 4-5 inch threshold (+) GB's (+) chair  MI CGA  2/7 Supervision + GB  2/6 CGA (+) GBs    Kitchen mobility for inc'd independence and safety negotiating kitchen environment MI  (2/8) Unable to assess 2/8 MI + RW-- DME ed along with additional recommendations to increase overall safety with RW positioning   2/6 Unable to assess   Household management (laundry/cleaning) MI  (2/8) Unable to assess 2/8 MI laundry  Discussed recommendations to place basket on cedar chest for increased reach and energy conservation but did demo ability to  from floor and manage    Load/unload washer/dryer and hang and transport with tray  2/6 Unable to assess      Alyson Donovan  2/8/2020

## 2020-02-08 NOTE — PLAN OF CARE
Problem: PHYSICAL THERAPY ADULT  Goal: Performs mobility at highest level of function for planned discharge setting  See evaluation for individualized goals  Description  Treatment/Interventions: ADL retraining, Functional transfer training, LE strengthening/ROM, Elevations, Therapeutic exercise, Endurance training, Cognitive reorientation, Patient/family training, Equipment eval/education, Bed mobility, Gait training, Compensatory technique education, Continued evaluation, Spoke to nursing, Spoke to MD, Spoke to advanced practitioner, Spoke to case management, OT, Family  Equipment Recommended: (Appears to have all necessary DME)       See flowsheet documentation for full assessment, interventions and recommendations  Outcome: Progressing  Note:   Prognosis: Good  Problem List: Decreased strength, Decreased range of motion, Decreased endurance, Impaired balance, Decreased mobility, Pain, Decreased skin integrity, Orthopedic restrictions  Assessment: Pt was seen today for a PT treatment session with focus on stair and gait training  After seeing picture of stairs to pts front door this PT feels that for first 5 she will be able to fit walker onto, for final 2 stairs she will be able to fold walker  Pt did well with negotiating 2 stairs with folded walker with min A  Pt is able to fold and unfold walker in standing without LOB or problems  Practiced donning CAM boot today, pt is able to complete this easiest in standing by opening boot standing and placing foot into boot then sitting to fasten it  She did well with all ambulation completing without LOB, SOB, dizziness or increased pain  Pt is cleared for ambulation in room and bathroom privlages independently  At end of session pt left in recliner with all needs in reach and all questions answered    Barriers to Discharge: Inaccessible home environment, Decreased caregiver support  Barriers to Discharge Comments: Below maged baseline  Recommendation: Home PT, Home independently     PT - OK to Discharge: No    See flowsheet documentation for full assessment

## 2020-02-08 NOTE — PLAN OF CARE
Problem: PHYSICAL THERAPY ADULT  Goal: Performs mobility at highest level of function for planned discharge setting  See evaluation for individualized goals  Description  Treatment/Interventions: ADL retraining, Functional transfer training, LE strengthening/ROM, Elevations, Therapeutic exercise, Endurance training, Cognitive reorientation, Patient/family training, Equipment eval/education, Bed mobility, Gait training, Compensatory technique education, Continued evaluation, Spoke to nursing, Spoke to MD, Spoke to advanced practitioner, Spoke to case management, OT, Family  Equipment Recommended: (Appears to have all necessary DME)       See flowsheet documentation for full assessment, interventions and recommendations  Outcome: Progressing  Note:   Prognosis: Good  Problem List: Decreased strength, Decreased range of motion, Decreased endurance, Impaired balance, Decreased mobility, Decreased cognition, Impaired judgement, Decreased safety awareness, Impaired vision, Impaired hearing, Obesity, Decreased skin integrity, Orthopedic restrictions(Reports Left ankle discomfort )  Assessment: Pt is a 62 y o  female s/p admit to [facility] on 2/6/2020 w/ Acute pulmonary embolism with acute cor pulmonale (HCC) -- s/p PE lysis  Recent fall causing dislocation of tarsometatarsal join of L foot -> underwent Lisfrac ORIF on 12/16/19; WBAT LLE as per ortho orders-- CAM Boot  Pt was transferred to [facility] ([facility] Unit) for rehab on 2/6/20  PT completed an extensive review of pt's medical and social history  As per chart review, result of  1/22/20 LLE doppler shows "Acute deep vein thrombosis is noted in the distal femoral vein, popliteal vein, and in the visualized portions of the posterior tibial and peroneal veins   No evidence of superficial thrombophlebitis noted "  Comorbidities affecting pt's functional performance at time of assessment include: DM2 ( pt reports that she does not have this condition, states having " pre-diabetes"), HTN, Morbid obesity, s/p L foot surgery, Cervical stenosis, Acute respiratory failure with hypoxia, pulmonary hypertension, COPD, DVT, PE caused from recent surgery 12/16/2019 for left ankle, and immobility  In summary, guiding factors including patient history, examination of body sytem(s), clinical presentation and clinical decision making were considered  Pt presents with comorbid conditions that impact function, comorbid conditions that may limit ability to progress, context of current functional limitations as compared to the prior level of function, impaired prior level of function, limited physical/social support, participation restrictions, recent hospitalization and ER visits, and with living environment deficits  Pt also presents with impaired: cognition,  safety, skin condition, vision, hearing (wants to make an appointment with an audiologist), vitals, BLE MMT strength, functional strength, endurance for activity, sit and stand balance, bed mobility, transfers, and gait abilities  Pt reported no pain during PT Eval; but stated that she had 5/10 " discomfort"  in her left foot s/p elevation training on curb steps  + SOB and increased fatigue with exertional activities  Clinical presentation is with unstable and unpredictable characteristics  The assigned level of complexity is: High  Pt will benefit from skilled PT tx intervention to maximize safe mobility in prep for discharge  Barriers to Discharge: Decreased caregiver support, Inaccessible home environment(LLE WBAT with CAM boot )  Barriers to Discharge Comments: Below maged baseline             See flowsheet documentation for full assessment

## 2020-02-08 NOTE — PROGRESS NOTES
Medication Regimen Review (MRR)    To promote positive health outcomes and reduce adverse consequences the patient's medication therapy has been reviewed by a pharmacist for the following potential problems:   1   documented indication and therapeutic benefits  2   appropriate dose, frequency, route, and duration of therapy  3   medication interactions, side effects, and allergies  4   medication or transcription errors  Medications are also reviewed for appropriate monitoring, duplicate therapy, and dose reduction  Based on the review please see the following recommendations  Patient information:    The patient is 62 y o  admitted for    Wt Readings from Last 1 Encounters:   02/08/20 (!) 150 kg (331 lb 5 6 oz)       Lab Results   Component Value Date    CALCIUM 9 3 02/02/2020    K 4 2 02/02/2020    CO2 29 02/02/2020     02/02/2020    BUN 15 02/02/2020    CREATININE 0 70 02/02/2020       Patient is taking the following medications that need review:        Recommendations:    1    Need a documented diagnosis or behavior for the medication quetiapine

## 2020-02-09 PROBLEM — F33.40 RECURRENT MAJOR DEPRESSIVE DISORDER, IN REMISSION (HCC): Status: ACTIVE | Noted: 2020-02-09

## 2020-02-09 PROBLEM — I11.9 HYPERTENSIVE HEART DISEASE WITHOUT HEART FAILURE: Status: ACTIVE | Noted: 2020-01-21

## 2020-02-09 PROBLEM — S93.325S: Status: ACTIVE | Noted: 2020-01-27

## 2020-02-09 LAB
INR PPP: 3.07 (ref 0.91–1.09)
PROTHROMBIN TIME: 33.3 SECONDS (ref 9.8–12)

## 2020-02-09 PROCEDURE — 99309 SBSQ NF CARE MODERATE MDM 30: CPT | Performed by: FAMILY MEDICINE

## 2020-02-09 PROCEDURE — 85610 PROTHROMBIN TIME: CPT | Performed by: FAMILY MEDICINE

## 2020-02-09 RX ADMIN — HYDROCHLOROTHIAZIDE 50 MG: 25 TABLET ORAL at 09:26

## 2020-02-09 RX ADMIN — METFORMIN HYDROCHLORIDE 500 MG: 500 TABLET ORAL at 09:26

## 2020-02-09 RX ADMIN — APIXABAN 5 MG: 5 TABLET, FILM COATED ORAL at 17:06

## 2020-02-09 RX ADMIN — CALCIUM 1 TABLET: 500 TABLET ORAL at 09:26

## 2020-02-09 RX ADMIN — Medication 1 TABLET: at 09:26

## 2020-02-09 RX ADMIN — OXYCODONE HYDROCHLORIDE AND ACETAMINOPHEN 500 MG: 500 TABLET ORAL at 09:26

## 2020-02-09 RX ADMIN — METFORMIN HYDROCHLORIDE 500 MG: 500 TABLET ORAL at 17:06

## 2020-02-09 RX ADMIN — QUETIAPINE FUMARATE 25 MG: 25 TABLET ORAL at 21:57

## 2020-02-09 NOTE — ASSESSMENT & PLAN NOTE
Lab Results   Component Value Date    HGBA1C 6 7 (H) 01/21/2020   cont  metformin  Blood sugars well controlled

## 2020-02-09 NOTE — ASSESSMENT & PLAN NOTE
Patient was discharged from [facility] after diagnosis of acute pulmonary embolism, status post PE lysis  And acute DVT on left lower extremity starting from the distal femoral vein  Acute PE secondary to recent surgery on the left leg ORIF Jory  Patient was started on heparin drip, transitioned to Coumadin  Discussed with the patient at length  She is requires strong dietary compliance and high dose of Coumadin in order to maintain her INR  She requires to be on anticoagulation for at least 6 months  Will require frequent blood work monitoring and concern for her getting subtherapeutic on Coumadin  Will switch her to Eliquis and see how she does  Patient is agreeable  She understands that this is a gray zone for using Eliquis in somebody her weight but is ready to try it out      Discussed with the patient that if she has a failure on Eliquis then she will have no option but to be on Coumadin

## 2020-02-09 NOTE — ASSESSMENT & PLAN NOTE
Dislocation of tarsometatarsal joint of left foot s/p ORIF on 12/16/19  Continue PT/OT  Pain controlled  Continue tramadol for pain control

## 2020-02-09 NOTE — ASSESSMENT & PLAN NOTE
Blood pressure is well controlled  Continue home medication hydrochlorothiazide 50 mg daily    Check BMP on Thursday

## 2020-02-09 NOTE — PROGRESS NOTES
Progress Note - Verlene Coma 1960, 62 y o  female MRN: [id number]    Unit/Bed#: -01 Encounter: [id number]    Primary Care Provider: Kathy Acuna MD   Date and time admitted to hospital: 2/6/2020 12:00 PM        * Acute pulmonary embolism with acute cor pulmonale [facility])  Assessment & Plan  [facility] was [facility] after diagnosis of acute pulmonary embolism, status post PE lysis  And acute DVT on left lower extremity starting from the distal femoral vein  Acute PE secondary to recent surgery on the left leg ORIF Lisfranc  Patient was started on heparin drip, transitioned to Coumadin  Discussed with the patient at length  She is requires strong dietary compliance and high dose of Coumadin in order to maintain her INR  She requires to be on anticoagulation for at least 6 months  Will require frequent blood work monitoring and concern for her getting subtherapeutic on Coumadin  Will switch her to Eliquis and see how she does  Patient is agreeable  She understands that this is a gray zone for using Eliquis in somebody her weight but is ready to try it out  Discussed with the patient that if she has a failure on Eliquis then she will have no option but to be on Coumadin      Recurrent major depressive disorder, in remission [facility])  Assessment & Plan  Stable on Seroquel  Dislocation of tarsometatarsal joint of foot, left, sequela  Assessment & Plan  Dislocation of tarsometatarsal joint of left foot s/p ORIF on 12/16/19  Continue PT/OT  Pain controlled  Continue tramadol for pain control    Morbid obesity due to excess calories ([facility] [address] )  Assessment & Plan  Lifestyle modification encouraged including diet exercise and lifestyle modification  BMI is 50 38  Encourage weight loss    Hypertensive heart disease without heart failure  Assessment & Plan  Blood pressure is well controlled  Continue home medication hydrochlorothiazide 50 mg daily    Check BMP on Thursday    Type 2 diabetes mellitus without complication, without long-term current use of insulin ([facility] [address] )  Assessment & Plan    Lab Results   Component Value Date    HGBA1C 6 7 (H) 2020   cont  metformin  Blood sugars well controlled        VTE Pharmacologic Prophylaxis:   Pharmacologic: Apixaban (Eliquis)  Mechanical VTE Prophylaxis in Place: No    Patient Centered Rounds: I have performed bedside rounds with nursing staff today  Discussions with Specialists or Other Care Team Provider:  None    Education and Discussions with Family / Patient:  Discussed with patient at bedside    Time Spent for Care: 30 minutes  More than 50% of total time spent on counseling and coordination of care as described above  Current Length of Stay: 3 day(s)    Current Patient Status: SNF Short Term Inpatient     Discharge Plan:  Pending progress    Code Status: Level 1 - Full Code      Subjective:   Patient denies any chest pain or shortness of breath or abdominal pain  She complains of some swelling in her left leg otherwise is feeling okay  She is agreeable to doing a trial of Eliquis    Objective:     Vitals:   Temp (24hrs), Av °F (36 7 °C), Min:97 9 °F (36 6 °C), Max:98 °F (36 7 °C)    Temp:  [97 9 °F (36 6 °C)-98 °F (36 7 °C)] 97 9 °F (36 6 °C)  HR:  [89-90] 90  Resp:  [20] 20  BP: (107-133)/(70-75) 133/70  SpO2:  [90 %-97 %] 90 %  Body mass index is 50 38 kg/m²  Input and Output Summary (last 24 hours): Intake/Output Summary (Last 24 hours) at 2020 1102  Last data filed at 2020 0700  Gross per 24 hour   Intake 300 ml   Output --   Net 300 ml       Physical Exam:     Physical Exam   Constitutional: She is oriented to person, place, and time  She appears well-developed and well-nourished  HENT:   Head: Normocephalic and atraumatic     Right Ear: External ear normal    Left Ear: External ear normal    Mouth/Throat: Oropharynx is clear and moist    Eyes: Conjunctivae and EOM are normal    Neck: Normal range of motion  Neck supple  Cardiovascular: Normal rate, regular rhythm and normal heart sounds  Pulmonary/Chest: Effort normal and breath sounds normal    Abdominal: Soft  Bowel sounds are normal  She exhibits no mass  There is no tenderness  There is no rebound and no guarding  Genitourinary:   Genitourinary Comments: deferred   Musculoskeletal: Normal range of motion  Edema of the left leg 2+ nonpitting with dressings on the left foot   Neurological: She is alert and oriented to person, place, and time  She has normal reflexes  Cranial nerves 2-12 are normal   Normal neurological exam   Skin: Skin is warm and dry  No rash noted  Psychiatric: She has a normal mood and affect  Nursing note and vitals reviewed  Additional Data:     Labs:            Results from last 7 days   Lab Units 02/09/20  0507   INR  3 07*     Results from last 7 days   Lab Units 02/06/20  1046 02/06/20  0620 02/05/20  2049 02/05/20  1626 02/05/20  1052 02/05/20  0645 02/04/20  2114 02/04/20  1608 02/04/20  1030 02/04/20  0654 02/03/20  2056 02/03/20  1610   POC GLUCOSE mg/dl 94 115 161* 98 141* 122 155* 108 128 126 119 114                   * I Have Reviewed All Lab Data Listed Above  * Additional Pertinent Lab Tests Reviewed:  [facility]    Imaging:    Imaging Reports Reviewed Today Include:  CT chest  Imaging Personally Reviewed by Myself Includes:  CTA chest and venous Doppler bilateral lower extremity    Recent Cultures (last 7 days):           Last 24 Hours Medication List:     Current Facility-Administered Medications:  acetaminophen 650 mg Oral Q6H PRN Jose Luis Rodríguez MD   apixaban 5 mg Oral BID Cherelle Valle MD   calcium carbonate 1 tablet Oral Daily With Chantel Thomas MD   docusate sodium 100 mg Oral BID Jose Luis Rodríguez MD   hydrochlorothiazide 50 mg Oral Daily Jose Luis Rodríguez MD   metFORMIN 500 mg Oral BID With Meals Cherelle Valle MD   multivitamin-minerals 1 tablet Oral Daily lAvina Casey MD   polyethylene glycol 17 g Oral Daily PRN Alvina Casey MD   QUEtiapine 25 mg Oral HS Alvina Casey MD   sodium chloride 2 spray Each Nare Q1H PRN Alvina Casey MD   traMADol 50 mg Oral Q6H PRN Alvina Caesy MD   tuberculin 5 Units Intradermal PRN Shahrzad Isidro MD        Today, Patient Was Seen By: Shahrzad Isidro MD    ** Please Note: Dictation voice to text software may have been used in the creation of this document   **

## 2020-02-09 NOTE — PLAN OF CARE
Problem: Potential for Falls  Goal: Patient will remain free of falls  Description  INTERVENTIONS:  - Assess patient frequently for physical needs  -  Identify cognitive and physical deficits and behaviors that affect risk of falls    -  [place] fall precautions as indicated by assessment   - Educate patient/family on patient safety including physical limitations  - Instruct patient to call for assistance with activity based on assessment  - Modify environment to reduce risk of injury  - Consider OT/PT consult to assist with strengthening/mobility  Outcome: Progressing     Problem: Prexisting or High Potential for Compromised Skin Integrity  Goal: Skin integrity is maintained or improved  Description  INTERVENTIONS:  - Identify patients at risk for skin breakdown  - Assess and monitor skin integrity  - Assess and monitor nutrition and hydration status  - Monitor labs   - Assess for incontinence   - Turn and reposition patient  - Assist with mobility/ambulation  - Relieve pressure over bony prominences  - Avoid friction and shearing  - Provide appropriate hygiene as needed including keeping skin clean and dry  - Evaluate need for skin moisturizer/barrier cream  - Collaborate with interdisciplinary team   - Patient/family teaching  - Consider wound care consult   Outcome: Progressing     Problem: PAIN - ADULT  Goal: Verbalizes/displays adequate comfort level or baseline comfort level  Description  Interventions:  - Encourage patient to monitor pain and request assistance  - Assess pain using appropriate pain scale  - Administer analgesics based on type and severity of pain and evaluate response  - Implement non-pharmacological measures as appropriate and evaluate response  - Consider cultural and social influences on pain and pain management  - Notify physician/advanced practitioner if interventions unsuccessful or patient reports new pain  Outcome: Progressing     Problem: INFECTION - ADULT  Goal: Absence or prevention of progression during hospitalization  Description  INTERVENTIONS:  - Assess and monitor for signs and symptoms of infection  - Monitor lab/diagnostic results  - Monitor all insertion sites, i e  indwelling lines, tubes, and drains  - Monitor endotracheal if appropriate and nasal secretions for changes in amount and color  - [place] appropriate cooling/warming therapies per order  - Administer medications as ordered  - Instruct and encourage patient and family to use good hand hygiene technique  - Identify and instruct in appropriate isolation precautions for identified infection/condition  Outcome: Progressing  Goal: Absence of fever/infection during neutropenic period  Description  INTERVENTIONS:  - Monitor WBC    Outcome: Progressing     Problem: SAFETY ADULT  Goal: Maintain or return to baseline ADL function  Description  INTERVENTIONS:  -  Assess patient's ability to carry out ADLs; assess patient's baseline for ADL function and identify physical deficits which impact ability to perform ADLs (bathing, care of mouth/teeth, toileting, grooming, dressing, etc )  - Assess/evaluate cause of self-care deficits   - Assess range of motion  - Assess patient's mobility; develop plan if impaired  - Assess patient's need for assistive devices and provide as appropriate  - Encourage maximum independence but intervene and supervise when necessary  - Involve family in performance of ADLs  - Assess for home care needs following discharge   - Consider OT consult to assist with ADL evaluation and planning for discharge  - Provide patient education as appropriate  Outcome: Progressing  Goal: Maintain or return mobility status to optimal level  Description  INTERVENTIONS:  - Assess patient's baseline mobility status (ambulation, transfers, stairs, etc )    - Identify cognitive and physical deficits and behaviors that affect mobility  - Identify mobility aids required to assist with transfers and/or ambulation (gait belt, sit-to-stand, lift, walker, cane, etc )  - Normal fall precautions as indicated by assessment  - Record patient progress and toleration of activity level on Mobility SBAR; progress patient to next Phase/Stage  - Instruct patient to call for assistance with activity based on assessment  - Consider rehabilitation consult to assist with strengthening/weightbearing, etc   Outcome: Progressing     Problem: DISCHARGE PLANNING  Goal: Discharge to home or other facility with appropriate resources  Description  INTERVENTIONS:  - Identify barriers to discharge w/patient and caregiver  - Arrange for needed discharge resources and transportation as appropriate  - Identify discharge learning needs (meds, wound care, etc )  - Arrange for interpretive services to assist at discharge as needed  - Refer to Case Management Department for coordinating discharge planning if the patient needs post-hospital services based on physician/advanced practitioner order or complex needs related to functional status, cognitive ability, or social support system  Outcome: Progressing

## 2020-02-09 NOTE — ASSESSMENT & PLAN NOTE
Lifestyle modification encouraged including diet exercise and lifestyle modification    BMI is 50 38  Encourage weight loss

## 2020-02-10 PROCEDURE — 97535 SELF CARE MNGMENT TRAINING: CPT

## 2020-02-10 PROCEDURE — 97116 GAIT TRAINING THERAPY: CPT

## 2020-02-10 PROCEDURE — 97530 THERAPEUTIC ACTIVITIES: CPT

## 2020-02-10 PROCEDURE — 97110 THERAPEUTIC EXERCISES: CPT

## 2020-02-10 RX ADMIN — METFORMIN HYDROCHLORIDE 500 MG: 500 TABLET ORAL at 09:35

## 2020-02-10 RX ADMIN — HYDROCHLOROTHIAZIDE 50 MG: 25 TABLET ORAL at 09:35

## 2020-02-10 RX ADMIN — DOCUSATE SODIUM 100 MG: 100 CAPSULE, LIQUID FILLED ORAL at 09:35

## 2020-02-10 RX ADMIN — APIXABAN 5 MG: 5 TABLET, FILM COATED ORAL at 09:35

## 2020-02-10 RX ADMIN — QUETIAPINE FUMARATE 25 MG: 25 TABLET ORAL at 21:53

## 2020-02-10 RX ADMIN — APIXABAN 5 MG: 5 TABLET, FILM COATED ORAL at 17:12

## 2020-02-10 RX ADMIN — DOCUSATE SODIUM 100 MG: 100 CAPSULE, LIQUID FILLED ORAL at 17:12

## 2020-02-10 RX ADMIN — CALCIUM 1 TABLET: 500 TABLET ORAL at 09:35

## 2020-02-10 RX ADMIN — METFORMIN HYDROCHLORIDE 500 MG: 500 TABLET ORAL at 17:12

## 2020-02-10 RX ADMIN — Medication 1 TABLET: at 09:35

## 2020-02-10 NOTE — PLAN OF CARE
Problem: PHYSICAL THERAPY ADULT  Goal: Performs mobility at highest level of function for planned discharge setting  See evaluation for individualized goals  Description  Treatment/Interventions: ADL retraining, Functional transfer training, LE strengthening/ROM, Elevations, Therapeutic exercise, Endurance training, Cognitive reorientation, Patient/family training, Equipment eval/education, Bed mobility, Gait training, Compensatory technique education, Continued evaluation, Spoke to nursing, Spoke to MD, Spoke to advanced practitioner, Spoke to case management, OT, Family  Equipment Recommended: (Appears to have all necessary DME)       See flowsheet documentation for full assessment, interventions and recommendations  Outcome: Progressing  Note:   Prognosis: Good  Problem List: Decreased strength, Decreased range of motion, Decreased endurance, Impaired balance, Decreased mobility, Impaired hearing, Impaired vision, Decreased skin integrity, Orthopedic restrictions(Pt reports planning on seeing an audiologist)  Assessment: Pt with excellent progress in skilled PT, rosaince PT Eval  Improvement assessed with: functional strength, activity tolerance, sit and stand balance, bed mobility , transfers, and gait  Pt participated in community reintegration activities: ambulated on carpet, wooden floor, vinyl, on/off elevator, up /down a ramp, and in/out of a simulated car with MOD I  All STG's achieved  A Care Conference is being held this afternoon at 1:30 PM to discuss discharge planning and needs  Pt reported burning discomfort in her left foot at the end of the PT tx session and declined BLE threrex  Anticipate discharge from skilled PT tomorrow    Barriers to Discharge: Inaccessible home environment, Decreased caregiver support(No social support systems, children live far away)  Barriers to Discharge Comments: LLE WBAT in CAM boot  Recommendation: Home PT, Home independently     PT - OK to Discharge: No    See flowsheet documentation for full assessment

## 2020-02-10 NOTE — OCCUPATIONAL THERAPY NOTE
OccupationalTherapy Treatment Note     Patient Name: Patrica Lopez  Today's Date: 2/10/2020  Problem List  Principal Problem:    Acute pulmonary embolism with acute cor pulmonale ([facility] [address] )  Active Problems:    Type 2 diabetes mellitus without complication, without long-term current use of insulin ([facility] [address] )    Hypertensive heart disease without heart failure    Morbid obesity due to excess calories ([facility]CC)    Dislocation of tarsometatarsal joint of foot, left, sequela    Recurrent major depressive disorder, in remission ([facility] [address] )    TIME: 7619-7691 (43 mins)  VITALS: stable  PAIN: none reported  COGNITION: WFL  PRECAUTIONS: WBAT LLE + CAM boot; fall risk      EXPIRATION DATE: 2/20/2020  TREATMENT DAY: 4  DISCHARGE RECOMMENDATION: home OT   ADDITIONAL COMMENTS: RN cleared pt for OT tx  Remains seated in bedside chair w/ all needs at end of session  ASSESSMENT: Patient participated in Skilled OT session this date with interventions consisting of ADL re training with the use of correct body mechnaics, Energy Conservation techniques, deep breathing technique, safety awareness and fall prevention techniques, therapeutic exercise to: increase functional use of BUEs, increase BUE muscle strength ,  therapeutic activities to: increase activity tolerance, increase standing tolerance time with unilateral UE support to complete sink level ADLs, increase cardiovascular endurance , increase dynamic sit/ stand balance during functional activity  and increase OOB/ sitting tolerance   Patient agreeable to OT treatment session, upon arrival patient was found seated OOB to Chair  In comparison to previous session, patient with improvements in activity tolerance, strengthening, balance, IADL and ADL status  Patient continues to be functioning below baseline level, occupational performance remains limited secondary to factors listed above and increased risk for falls and injury     From OT standpoint, recommendation at time of d/c would be Home OT  Patient to benefit from continued Occupational Therapy treatment while in the hospital to address deficits as defined above and maximize level of functional independence with ADLs and functional mobility  Care conference notes (20 mins): TIM COCHRAN, OT and pt present  OT reviewed patient's progress in therapy - meeting all goals for both PT and OT at MI to I level  Good safety awareness noted  Patient has a RW (purchased RW tray and walker basket for phone, keys, etc), kneeling scooter, crutches, SPC, GBs (around toilet and in shower), and shower chair at home  PT states that they are planning to d/c here tomorrow, OT to do same (or Wednesday this week)  Patient has two sons and 11 young grandkids (oldest is 10years old) who live approx an hour away [place], [place])  Patient works full time from home as a   Patient would like to continue with home OT/PT at discharge, home alone  Patient also states she will be switched to Eliquis (paying $10 w/ coupon code)  Attending would like to monitor patient another 3 days on Eliquis, anticipated d/c home independently w/ home therapy services this week when medically cleared  Patient reports only concern w/ going home would be managing steps, however completed MI/I today w/ good RW management  Patient also discusses she plans on making lifestyle changes when she gets home re: diet, exercise to lose weight      GOALS:    Goals STG achieved within 2 weeks Performance at Initial Evaluation 2/6/2020  Current Performance (last date completed)   Grooming while standing at sink MI  (2/8, 2/10) S 2/10, 2/8 MI  2/7 S/MI with good tolerance and no LOB  2/6 S   ADL transfers  MI  (2/8, 2/10) c/S 2/10, 2/8 MI STS and SPT  2/7 S/MI + CAM boot and WBAT   Good safety noted and no LOB + RW  2/6 c/S   Bathroom mobility with appropriate AD MI  (2/8, 2/10) c/S 2/10, 2/8 MI + RW  2/7 S/MI + RW and Boot  2/6 c/S   UB ADL  MI  (2/7, 2/8, 2/10) S 2/10 reports MI w/ ADL this morning  2/8 reports completion on own this date with nursing aide confirming   2/7 Independent bathing/dressing  2/6 S   LB ADL, AE PRN + CAM BOOT  MI  (2/7, 2/8, 2/10) Mod A- No cam boot 2/10 MI w/ increased time including CAM boot  2/8 reports completion on own this date except for CAM boot (nursing confirms)-- CAM boot management min assist (see assessment for further details)  2/7 MI, increased time and SOB noted   Ed on energy conservation-- sat on toilet as she would at home     2/6 Mod A - no cam boot    Toileting/clothing management and hygiene MI  (2/7, 2/8) Min A 2/8 MI CM and hygiene  2/7 MI CM and hygiene  2/6 Min A   Dynamic standing balance for increased safety when completing purposeful tasks F+  (2/7, 2/8, 2/10) F-  2/10 F+/G-  2/8 Fair+  2/6 F-   Increase standing tolerance for inc'd safety with standing purposeful tasks 5-8 min   (2/7, 2/8, 2/10) 3 min  2/10, 2/8 12 min with activity and mobility   2/7 4-5 min   2/6 3 min    Participate in therex 1-3x/week for inc'd overall stamina/activity tolerance for purposeful tasks To be completed  (2/7, 2/10)   2/10 Red theraband HEP - in room  2/7 with AM ADL    shower stall transfer 4-5 inch threshold (+) GB's (+) chair  MI  (2/10) CGA  2/10 MI  2/7 Supervision + GB  2/6 CGA (+) GBs    Kitchen mobility for inc'd independence and safety negotiating kitchen environment MI  (2/8, 2/10) Unable to assess 2/10 MI w/ RW - ordered tray today for home  2/8 MI + RW-- DME ed along with additional recommendations to increase overall safety with RW positioning   2/6 Unable to assess   Household management (laundry/cleaning) MI  (2/8, 2/10) Unable to assess 2/10 MI w/ RW and tray  2/8 MI laundry  Discussed recommendations to place basket on cedar chest for increased reach and energy conservation but did demo ability to  from floor and manage    Load/unload washer/dryer and hang and transport with tray  2/6 Unable to assess         Mookie Stern MS, OTR/L

## 2020-02-10 NOTE — SOCIAL WORK
[facility] held with patient only  OT, SW and DON present, baseline care plan reviewed and pt is agreeable to care plan  Pt has been very good progress in therapy and is being discharged this week  Pt ambulating with a RW (owns at home) and already purchased a walker tray online  No other equipment needs  Pt would like to use home care for discharge  SW to send referral  Pt was switched to Eliquis today (she already checked cost and this is $10 with discount coupon)  Attending would like to monitor pt another 3 days on Eliquis  SW will continue to follow at this time and coordinate discharge when pt is medically cleared

## 2020-02-10 NOTE — PHYSICAL THERAPY NOTE
Physical Therapy Daily Treatment Note        02/10/20: 44 minutes (10:06 to10:50)   Pain Assessment   Pain Assessment 0-10   Pain Score No Pain   Restrictions/Precautions   Weight Bearing Precautions Per Order Yes   LLE Weight Bearing Per Order WBAT   Braces or Orthoses CAM Boot   Other Precautions Pain; Fall Risk;Visual impairment;Hard of hearing  (Wears reading glasses)   General   Chart Reviewed Yes   Response to Previous Treatment Patient with no complaints from previous session  Family/Caregiver Present No   Cognition   Overall Cognitive Status WFL   Arousal/Participation Cooperative   Attention Within functional limits   Memory Within functional limits   Following Commands Follows all commands and directions without difficulty   Bed Mobility   Rolling R 7  Independent   Rolling L 7  Independent   Supine to Sit 7  Independent   Sit to Supine 7  Independent   Additional Comments   (Bed Mob: HOB flat, no rail, decreased time and effort)   Transfers   Sit to Stand 6  Modified independent   Additional items Increased time required; Other  (Good hand placement)   Stand to Sit 6  Modified independent   Additional items Increased time required; Other  (Good hand placement; + controlled descent)   Stand pivot 6  Modified independent  (SPT with RW)   Additional items Increased time required; Other  (Good RW management)   Toilet transfer 6  Modified independent  (Sit <-stand and SPT with RW)   Additional items Increased time required;Standard toilet  (Good RW management)   Car transfer 6  Modified independent  (Used shower bench to simualate car transfer)   Additional items   (Used hands to lift LLE over simulated car wall)   Additional Comments   (Transfers: EOB, recliner, toilet)   Ambulation/Elevation   Gait pattern Improper Weight shift  (Slight limping due to + LLD from CAM boot)   Gait Assistance 6  Modified independent   Additional items   (+ SOB on exertion)   Assistive Device Rolling walker; Other (Comment)  (Folded RW)   Distance 430 feet, 73 feet x 2, 25 feet x 2, 73 feet x 2  (Negotiated multiple turns and obstacles during all gt trials)   Stair Management Assistance 6  Modified independent   Additional items   (Pt able to fold her RW Independently without cues)   Stair Management Technique One rail R;Foreward; Step to pattern; Other (Comment)  (Folded RW ( parallel to steps))   Number of Stairs 8  (Appears safe/steady and steps with folded RW)   Ramp Technique Forward   Ramp Assistance 6  Modified independent  (Appears safe and steady on ramp)   Additional items   (Ascended and descended ramp with RW and MOD I)   Curbs 1 + 1 curb steps with RW and MOD I  (Good RW management, appears safe/steady)   Balance   Static Sitting   (Good +)   Dynamic Sitting Fair +   Static Standing   (Good (-) with RW)   Dynamic Standing   (Fair + with RW -> picked pen off floor with RW and  MOD I   Ambulatory   (Fair + with RW)   Higher level balance   (F+ with RW: I toileting activities)   Endurance Deficit   Endurance Deficit Yes  (Provided with monitored rest breaks prn, between activities)   Activity Tolerance   Activity Tolerance Patient tolerated treatment well;Patient limited by fatigue  (Limited by left foot burning discomfort by end of session)   Assessment   Prognosis Good   Problem List Decreased strength;Decreased range of motion;Decreased endurance; Impaired balance;Decreased mobility; Impaired hearing; Impaired vision;Decreased skin integrity;Orthopedic restrictions  (Pt reports planning on seeing an audiologist)   Assessment Pt with excellent progress in skilled PT, csince PT Eval  Improvement assessed with: functional strength, activity tolerance, sit and stand balance, bed mobility , transfers, and gait  Pt participated in community reintegration activities: ambulated on carpet, wooden floor, vinyl, on/off elevator, up /down a ramp, and in/out of a simulated car with MOD I  All STG's achieved   A Care Conference is being held this afternoon at 1:30 PM to discuss discharge planning and needs  Pt reported burning discomfort in her left foot at the end of the PT tx session and declined BLE threrex  Anticipate discharge from skilled PT tomorrow  Barriers to Discharge Inaccessible home environment;Decreased caregiver support  (No social support systems, children live far away)   Barriers to Discharge Comments LLE WBAT in CAM boot   Goals   Patient Goals   (" To possibly go home by Wednesday")   STG Expiration Date 02/19/20   Short Term Goal #1 See grid   PT Treatment Day 3   Plan   Treatment/Interventions ADL retraining;Functional transfer training;LE strengthening/ROM; Elevations; Therapeutic exercise; Endurance training;Cognitive reorientation; Bed mobility; Equipment eval/education;Patient/family training;Gait training; Compensatory technique education;Continued evaluation;Spoke to MD;Spoke to nursing;Spoke to case management;Spoke to advanced practitioner;OT;Family   Progress Improving as expected   PT Frequency   (5 to 7x/week)       Goals    STG achieved within 1 to 1 5 weeks Performance at Initial Evaluation (2/7/20) Current Performance (last date completed)   Supine to sit transitions to increase ease of transfer and allow pt to get out of bed,    MOD I Supervision  HOB flat, no rails 2/10/20   Sit to supine transitions to increase ease of transfer and allow pt to get back into bed MOD I Supervision  HOB flat, no rails 2/10/20   Functional transfers to facilitate safe return to previous living environment     MOD I Supervision  Sit <->stand transfers and SPT's with RW 2/10/20   Ambulation with RW > or = 350 feet for safe household and community distance ambulation    MOD I Ambulated with a RW for 350 feet with Supervision 2/10/20   Ascend/descend 7 steps with right handrail up, with folded RW ( or most appropriate method ( to allow pt to safely enter/exit home )      MOD I Not performed    2/10/20   Ascend/descend a curb step, with RW, ( to allow for safe community re-entry)   MOD I 1 + 1 curb steps with RW and CG A 2/10/20   Improve standing functional balance to allow for pt to  object from floor     MOD I Not performed 2/10/20  Fair + dynamic stand balance: picked pen off floor with Supervision         Vitals  Seated at rest: 150/91, HR 94, SPO2 (RA) 95%  After ambulating with a RW poor 430 feet (seated) , SPO2 (RA) 93%  After negotiating 8 steps with RHR up and folded RW ( seated): /98, , SPO2 (RA) 93%

## 2020-02-10 NOTE — NURSING NOTE
Pt received this am, oob in chair a+o, denies pain, no distress, cam boot on left foot, no distress, will monitor

## 2020-02-10 NOTE — PLAN OF CARE
Problem: Potential for Falls  Goal: Patient will remain free of falls  Description  INTERVENTIONS:  - Assess patient frequently for physical needs  -  Identify cognitive and physical deficits and behaviors that affect risk of falls    -  [place] fall precautions as indicated by assessment   - Educate patient/family on patient safety including physical limitations  - Instruct patient to call for assistance with activity based on assessment  - Modify environment to reduce risk of injury  - Consider OT/PT consult to assist with strengthening/mobility  Outcome: Progressing     Problem: Prexisting or High Potential for Compromised Skin Integrity  Goal: Skin integrity is maintained or improved  Description  INTERVENTIONS:  - Identify patients at risk for skin breakdown  - Assess and monitor skin integrity  - Assess and monitor nutrition and hydration status  - Monitor labs   - Assess for incontinence   - Turn and reposition patient  - Assist with mobility/ambulation  - Relieve pressure over bony prominences  - Avoid friction and shearing  - Provide appropriate hygiene as needed including keeping skin clean and dry  - Evaluate need for skin moisturizer/barrier cream  - Collaborate with interdisciplinary team   - Patient/family teaching  - Consider wound care consult   Outcome: Progressing     Problem: PAIN - ADULT  Goal: Verbalizes/displays adequate comfort level or baseline comfort level  Description  Interventions:  - Encourage patient to monitor pain and request assistance  - Assess pain using appropriate pain scale  - Administer analgesics based on type and severity of pain and evaluate response  - Implement non-pharmacological measures as appropriate and evaluate response  - Consider cultural and social influences on pain and pain management  - Notify physician/advanced practitioner if interventions unsuccessful or patient reports new pain  Outcome: Progressing     Problem: INFECTION - ADULT  Goal: Absence or prevention of progression during hospitalization  Description  INTERVENTIONS:  - Assess and monitor for signs and symptoms of infection  - Monitor lab/diagnostic results  - Monitor all insertion sites, i e  indwelling lines, tubes, and drains  - Monitor endotracheal if appropriate and nasal secretions for changes in amount and color  - [place] appropriate cooling/warming therapies per order  - Administer medications as ordered  - Instruct and encourage patient and family to use good hand hygiene technique  - Identify and instruct in appropriate isolation precautions for identified infection/condition  Outcome: Progressing  Goal: Absence of fever/infection during neutropenic period  Description  INTERVENTIONS:  - Monitor WBC    Outcome: Progressing     Problem: SAFETY ADULT  Goal: Maintain or return to baseline ADL function  Description  INTERVENTIONS:  -  Assess patient's ability to carry out ADLs; assess patient's baseline for ADL function and identify physical deficits which impact ability to perform ADLs (bathing, care of mouth/teeth, toileting, grooming, dressing, etc )  - Assess/evaluate cause of self-care deficits   - Assess range of motion  - Assess patient's mobility; develop plan if impaired  - Assess patient's need for assistive devices and provide as appropriate  - Encourage maximum independence but intervene and supervise when necessary  - Involve family in performance of ADLs  - Assess for home care needs following discharge   - Consider OT consult to assist with ADL evaluation and planning for discharge  - Provide patient education as appropriate  Outcome: Progressing  Goal: Maintain or return mobility status to optimal level  Description  INTERVENTIONS:  - Assess patient's baseline mobility status (ambulation, transfers, stairs, etc )    - Identify cognitive and physical deficits and behaviors that affect mobility  - Identify mobility aids required to assist with transfers and/or ambulation (gait belt, sit-to-stand, lift, walker, cane, etc )  - [place] fall precautions as indicated by assessment  - Record patient progress and toleration of activity level on Mobility SBAR; progress patient to next Phase/Stage  - Instruct patient to call for assistance with activity based on assessment  - Consider rehabilitation consult to assist with strengthening/weightbearing, etc   Outcome: Progressing     Problem: DISCHARGE PLANNING  Goal: Discharge to home or other facility with appropriate resources  Description  INTERVENTIONS:  - Identify barriers to discharge w/patient and caregiver  - Arrange for needed discharge resources and transportation as appropriate  - Identify discharge learning needs (meds, wound care, etc )  - Arrange for interpretive services to assist at discharge as needed  - Refer to Case Management Department for coordinating discharge planning if the patient needs post-hospital services based on physician/advanced practitioner order or complex needs related to functional status, cognitive ability, or social support system  Outcome: Progressing

## 2020-02-11 PROCEDURE — 97535 SELF CARE MNGMENT TRAINING: CPT

## 2020-02-11 PROCEDURE — 97530 THERAPEUTIC ACTIVITIES: CPT

## 2020-02-11 PROCEDURE — 99316 NF DSCHRG MGMT 30 MIN+: CPT | Performed by: FAMILY MEDICINE

## 2020-02-11 PROCEDURE — 97116 GAIT TRAINING THERAPY: CPT

## 2020-02-11 RX ORDER — QUETIAPINE FUMARATE 25 MG/1
25 TABLET, FILM COATED ORAL
Qty: 3 TABLET | Refills: 0 | Status: ON HOLD | OUTPATIENT
Start: 2020-02-11 | End: 2020-05-19 | Stop reason: ALTCHOICE

## 2020-02-11 RX ADMIN — DOCUSATE SODIUM 100 MG: 100 CAPSULE, LIQUID FILLED ORAL at 17:17

## 2020-02-11 RX ADMIN — DOCUSATE SODIUM 100 MG: 100 CAPSULE, LIQUID FILLED ORAL at 08:09

## 2020-02-11 RX ADMIN — Medication 1 TABLET: at 08:09

## 2020-02-11 RX ADMIN — METFORMIN HYDROCHLORIDE 500 MG: 500 TABLET ORAL at 17:17

## 2020-02-11 RX ADMIN — APIXABAN 5 MG: 5 TABLET, FILM COATED ORAL at 17:17

## 2020-02-11 RX ADMIN — APIXABAN 5 MG: 5 TABLET, FILM COATED ORAL at 08:09

## 2020-02-11 RX ADMIN — QUETIAPINE FUMARATE 25 MG: 25 TABLET ORAL at 21:01

## 2020-02-11 RX ADMIN — METFORMIN HYDROCHLORIDE 500 MG: 500 TABLET ORAL at 08:09

## 2020-02-11 RX ADMIN — CALCIUM 1 TABLET: 500 TABLET ORAL at 08:08

## 2020-02-11 RX ADMIN — HYDROCHLOROTHIAZIDE 50 MG: 25 TABLET ORAL at 08:09

## 2020-02-11 NOTE — OCCUPATIONAL THERAPY NOTE
[address] Treatment Note + Discharge Summary     Patient Name: Shannan Lilly  IOPBW'V Date: 2/11/2020  Problem List  Principal Problem:    Acute pulmonary embolism with acute cor pulmonale ([facility] )  Active Problems:    Type 2 diabetes mellitus without complication, without long-term current use of insulin ([facility] )    Hypertensive heart disease without heart failure    Morbid obesity due to excess calories (HCC)    Dislocation of tarsometatarsal joint of foot, left, sequela    Recurrent major depressive disorder, in remission ([facility] )    TIME: 3169-1404 (54 mins)  VITALS: stable throughout  PAIN: 2/10 L foot/knee, improved w/ activity  COGNITION: WFL  PRECAUTIONS: WBAT LLE + CAM boot; fall risk       EXPIRATION DATE: 2/20/2020  TREATMENT DAY: 5  DISCHARGE RECOMMENDATION: Home OT  ADDITIONAL COMMENTS: RN cleared pt for OT tx  Remains seated in bedside chair w/ all needs at end of session  TREATMENT SESSION: Patient seen this date for OT with focus on goals as set by OTR/L  Emphasis on ADL retraining, stand balance, activity tolerance, and strengthening  Patient complete dressing and bathing tasks - completed all UB and LB ADLs w/ MI and increased time this date  Appearing SOB w/ activity/bending forward to don/doff pants, underwear and CAM boot and able to self initiate rest breaks as need  Patient states "it's because I have a large belly" and appears motivated to return home  Pt verbalized need to use bathroom, and completed toileting w/ MI  Pt completed grooming standing at sinkside with MI  Overall, patient demos improvements in the areas of endurance, stand tolerance, balance, activity tolerance, and improved independence with all ADLs  D/C from OT today  Occupational Therapy Discharge Summary    Disposition: Patient will be discharged from skilled OT interventions effective 2/11/2020    Pt will be d/c home independently in next few days (pending medical status/trial w/ Eliquis) with home OT/PT services  AE/DME:  Pt owns kneeling scooter (does not plan to use), crutches (does not plan to use), RW (plans to use), GBs (around raised toilet seat and walk in shower), shower chair, and SPC  Patient additionally purchased RW tray and side basket which are anticipated to deliver to her home by the end of this week  Discharge Summary: Pt participated in 5/5 OT sessions  Pt achieved all goals  Deficits remain in pain at times, activity tolerance (large body habitus - states plans to make lifestyle changes when home re: exercise and diet)  Emphasis of OT sessions on functional ADLs, therapeutic exercises and therapeutic activities to maximize endurance, balance, activity tolerance, pain tolerance, safety, judgment, attention, sequencing and independence with ADLS, IADLs, functional transfers and mobility  Throughout stay, patient extensively educated on safe functional transfer techniques, appropriate use of DME/AE to improve functional performance, and activity modification techniques for energy conservation for safe participation with ADLs, IADLs and functional transfers/mobility  Pt denies concerns with d/c home  Pt will have VNA services, home OT/PT to ensure safety in home environment  OT met with pt, pt denies questions/concerns for OT at this time  Patient states family will provide transport at D/C, live about 1 hour away  Patient also stated today that she is going to try and sell her apt she currently lives in and move closer to her sons [facility]/[address])  Recommendations: OT recommends pt return home independently with home OT services focusing on noted deficits in order to maximize safety and independence during occupational performance             GOALS:    Goals STG achieved within 2 weeks Performance at Initial Evaluation 2/6/2020  Current Performance (last date completed)   Grooming while standing at sink MI  (2/8, 2/10)  MET S 2/10, 2/8 MI  2/7 S/MI with good tolerance and no LOB  2/6 S   ADL transfers  MI  (2/8, 2/10)  MET c/S 2/10, 2/8 MI STS and SPT  2/7 S/MI + CAM boot and WBAT   Good safety noted and no LOB + RW  2/6 c/S   Bathroom mobility with appropriate AD MI  (2/8, 2/10)  MET c/S 2/10, 2/8 MI + RW  2/7 S/MI + RW and Boot  2/6 c/S   UB ADL  MI  (2/7, 2/8, 2/10)  MET S 2/10 reports MI w/ ADL this morning  2/8 reports completion on own this date with nursing aide confirming   2/7 Independent bathing/dressing  2/6 S   LB ADL, AE PRN + CAM BOOT  MI  (2/7, 2/8, 2/10)  MET Mod A- No cam boot 2/10 MI w/ increased time including CAM boot  2/8 reports completion on own this date except for CAM boot (nursing confirms)-- CAM boot management min assist (see assessment for further details)  2/7 MI, increased time and SOB noted   Ed on energy conservation-- sat on toilet as she would at home     2/6 Mod A - no cam boot    Toileting/clothing management and hygiene MI  (2/7, 2/8)  MET Min A 2/8 MI CM and hygiene  2/7 MI CM and hygiene  2/6 Min A   Dynamic standing balance for increased safety when completing purposeful tasks F+  (2/7, 2/8, 2/10)  MET F-  2/10 F+/G-  2/8 Fair+  2/6 F-   Increase standing tolerance for inc'd safety with standing purposeful tasks 5-8 min   (2/7, 2/8, 2/10)  MET 3 min  2/10, 2/8 12 min with activity and mobility   2/7 4-5 min   2/6 3 min    Participate in therex 1-3x/week for inc'd overall stamina/activity tolerance for purposeful tasks To be completed  (2/7, 2/10)  MET   2/10 Red theraband HEP - in room  2/7 with AM ADL    shower stall transfer 4-5 inch threshold (+) GB's (+) chair  MI  (2/10)  MET CGA  2/10 MI  2/7 Supervision + GB  2/6 CGA (+) GBs    Kitchen mobility for inc'd independence and safety negotiating kitchen environment MI  (2/8, 2/10)  MET Unable to assess 2/10 MI w/ RW - ordered tray today for home  2/8 MI + RW-- DME ed along with additional recommendations to increase overall safety with RW positioning   2/6 Unable to assess   Household management (laundry/cleaning) MI  (2/8, 2/10)  MET Unable to assess 2/10 MI w/ RW and tray  2/8 MI laundry   Discussed recommendations to place basket on cedar chest for increased reach and energy conservation but did demo ability to  from floor and manage   Load/unload washer/dryer and hang and transport with tray  2/6 Unable to assess            Kenny Santos MS, OTR/L

## 2020-02-11 NOTE — PLAN OF CARE
Problem: Potential for Falls  Goal: Patient will remain free of falls  Description  INTERVENTIONS:  - Assess patient frequently for physical needs  -  Identify cognitive and physical deficits and behaviors that affect risk of falls    -  [place] fall precautions as indicated by assessment   - Educate patient/family on patient safety including physical limitations  - Instruct patient to call for assistance with activity based on assessment  - Modify environment to reduce risk of injury  - Consider OT/PT consult to assist with strengthening/mobility  Outcome: Progressing     Problem: Prexisting or High Potential for Compromised Skin Integrity  Goal: Skin integrity is maintained or improved  Description  INTERVENTIONS:  - Identify patients at risk for skin breakdown  - Assess and monitor skin integrity  - Assess and monitor nutrition and hydration status  - Monitor labs   - Assess for incontinence   - Turn and reposition patient  - Assist with mobility/ambulation  - Relieve pressure over bony prominences  - Avoid friction and shearing  - Provide appropriate hygiene as needed including keeping skin clean and dry  - Evaluate need for skin moisturizer/barrier cream  - Collaborate with interdisciplinary team   - Patient/family teaching  - Consider wound care consult   Outcome: Progressing     Problem: PAIN - ADULT  Goal: Verbalizes/displays adequate comfort level or baseline comfort level  Description  Interventions:  - Encourage patient to monitor pain and request assistance  - Assess pain using appropriate pain scale  - Administer analgesics based on type and severity of pain and evaluate response  - Implement non-pharmacological measures as appropriate and evaluate response  - Consider cultural and social influences on pain and pain management  - Notify physician/advanced practitioner if interventions unsuccessful or patient reports new pain  Outcome: Progressing     Problem: INFECTION - ADULT  Goal: Absence or prevention of progression during hospitalization  Description  INTERVENTIONS:  - Assess and monitor for signs and symptoms of infection  - Monitor lab/diagnostic results  - Monitor all insertion sites, i e  indwelling lines, tubes, and drains  - Monitor endotracheal if appropriate and nasal secretions for changes in amount and color  - [place] appropriate cooling/warming therapies per order  - Administer medications as ordered  - Instruct and encourage patient and family to use good hand hygiene technique  - Identify and instruct in appropriate isolation precautions for identified infection/condition  Outcome: Progressing  Goal: Absence of fever/infection during neutropenic period  Description  INTERVENTIONS:  - Monitor WBC    Outcome: Progressing     Problem: SAFETY ADULT  Goal: Maintain or return to baseline ADL function  Description  INTERVENTIONS:  -  Assess patient's ability to carry out ADLs; assess patient's baseline for ADL function and identify physical deficits which impact ability to perform ADLs (bathing, care of mouth/teeth, toileting, grooming, dressing, etc )  - Assess/evaluate cause of self-care deficits   - Assess range of motion  - Assess patient's mobility; develop plan if impaired  - Assess patient's need for assistive devices and provide as appropriate  - Encourage maximum independence but intervene and supervise when necessary  - Involve family in performance of ADLs  - Assess for home care needs following discharge   - Consider OT consult to assist with ADL evaluation and planning for discharge  - Provide patient education as appropriate  Outcome: Progressing  Goal: Maintain or return mobility status to optimal level  Description  INTERVENTIONS:  - Assess patient's baseline mobility status (ambulation, transfers, stairs, etc )    - Identify cognitive and physical deficits and behaviors that affect mobility  - Identify mobility aids required to assist with transfers and/or ambulation (gait belt, sit-to-stand, lift, walker, cane, etc )  - [place] fall precautions as indicated by assessment  - Record patient progress and toleration of activity level on Mobility SBAR; progress patient to next Phase/Stage  - Instruct patient to call for assistance with activity based on assessment  - Consider rehabilitation consult to assist with strengthening/weightbearing, etc   Outcome: Progressing     Problem: DISCHARGE PLANNING  Goal: Discharge to home or other facility with appropriate resources  Description  INTERVENTIONS:  - Identify barriers to discharge w/patient and caregiver  - Arrange for needed discharge resources and transportation as appropriate  - Identify discharge learning needs (meds, wound care, etc )  - Arrange for interpretive services to assist at discharge as needed  - Refer to Case Management Department for coordinating discharge planning if the patient needs post-hospital services based on physician/advanced practitioner order or complex needs related to functional status, cognitive ability, or social support system  Outcome: Progressing

## 2020-02-11 NOTE — PLAN OF CARE
Problem: PHYSICAL THERAPY ADULT  Goal: Performs mobility at highest level of function for planned discharge setting  See evaluation for individualized goals  Description  Treatment/Interventions: ADL retraining, Functional transfer training, LE strengthening/ROM, Elevations, Therapeutic exercise, Endurance training, Cognitive reorientation, Patient/family training, Equipment eval/education, Bed mobility, Gait training, Compensatory technique education, Continued evaluation, Spoke to nursing, Spoke to MD, Spoke to advanced practitioner, Spoke to case management, OT, Family  Equipment Recommended: (Appears to have all necessary DME)       See flowsheet documentation for full assessment, interventions and recommendations  Outcome: Adequate for Discharge  Note:   Prognosis: Good  Problem List: Decreased strength, Decreased range of motion, Decreased endurance, Impaired balance, Decreased mobility, Impaired judgement, Decreased safety awareness, Impaired vision, Decreased skin integrity, Orthopedic restrictions, Obesity(Pt reports wanting to see and audiologist as an Outpt)  Assessment: Pt is discharged from skilled PT effective today, 2/11/20  Pt remains on [facility] Unit with plans to return to home alone tomorrow, 2/12/20  Since 2/7/20 PT Eval,  pt was seen for 4 skilled PT tx sessions for  theract, and gait/elevation training  Pt with excellent progress in skilled PT, this past week  Improvement assessed with: [place] Index Score, functional strength, balance, activity tolerance, safety, bed mobility, transfers, and gait/elevations  All STG's achieved  Please see grid below and flowsheet, for details on pt's functional mobility status at discharge    Barriers to Discharge: Decreased caregiver support, Inaccessible home environment(No social support system; children live far away)  Barriers to Discharge Comments: LLE WBAT in CAM boot  Recommendation: Home PT, D/C PT, Home independently     PT - OK to Discharge: Yes    See flowsheet documentation for full assessment

## 2020-02-11 NOTE — PHYSICAL THERAPY NOTE
Physical Therapy Daily Treatment Note and Discharge Summary       02/11/20: 54 minutes (14:00 to 14:54)   Pain Assessment   Pain Assessment No/denies pain   Pain Score No Pain   Restrictions/Precautions   Weight Bearing Precautions Per Order Yes   LLE Weight Bearing Per Order WBAT   Braces or Orthoses CAM Boot   Other Precautions Pain; Fall Risk;Hard of hearing;Visual impairment  (Wears reading glasses)   General   Family/Caregiver Present No   Cognition   Overall Cognitive Status WFL   Arousal/Participation Cooperative   Attention Within functional limits   Orientation Level Oriented X4   Memory Within functional limits   Following Commands Follows all commands and directions without difficulty   Bed Mobility   Rolling R 7  Independent   Rolling L 7  Independent   Supine to Sit 7  Independent   Sit to Supine 7  Independent   Additional Comments Decreased time and effort  (Bed Mobility: HOB flat, no rail)   Transfers   Sit to Stand 6  Modified independent   Additional items Increased time required  (Occassionally uses inappropropriate hand placement; but still  appears safe/steady)   Stand to Sit 6  Modified independent  (+ controlled descent)   Additional items Increased time required  (Occassionally uses inappropropriate hand placement; but still  appears safe/steady)      Stand pivot 6  Modified independent  (SPT with RW and SPT with no AD)   Additional items Increased time required  (Occassionally abandons RW, but still appears safe and steady)   Toilet transfer 6  Modified independent  (Sit <->stand and SPT with RW)   Additional items Increased time required  (Good RW management)   Car transfer 6  Modified independent  (Removed footboard on bed for simulated car transfer)   Additional items   (Lifted BLE's over simulated car wall, without use of hands)   Additional Comments (continued): low unsecured chair, highback chair  (Transfers: EOB, recliner, toilet, low couch, armchair)   Ambulation/Elevation   Gait pattern   (Slight limping due to +LLD from CAM boot), forward flexion ( cues to stay closer to RW)   Gait Assistance 6  Modified independent   Additional items   (+ SOB on exertion)   Assistive Device Rolling walker   Distance 550 feet, 377 feet, 25 feet x 2, 18 feet x 2  (Negotiated multiple turns/obstacles in pathway)   Stair Management Assistance 6  Modified independent   Additional items Increased time required; Other (Comment)  (Pt able to fold and unfold RW Independently )   Stair Management Technique Step to pattern; Foreward; One rail R  (Folded RW parallel to steps; with RHR up)   Number of Stairs 8  (Safe and steady on steps; good sequencing)   Ramp Technique Forward; No rails   Ramp Assistance 6  Modified independent  (Ascended/descended ramp with RW and MOD I)   Additional items Increased time required  (Safe and steady on ramp)   Curbs 1+ 1 curb steps with RW and MOD I  (Good RW management)   Balance   Static Sitting Normal   Dynamic Sitting   (Good (-))   Static Standing   (Good with RW)   Dynamic Standing   (Good (-) with RW) -> picked pen off floor with MOD I   Ambulatory   (Good (-) with RW)   Higher level balance   (G (-) with RW: I toileting act ( clothing and hygiene))   Activity Tolerance   Activity Tolerance Patient limited by fatigue   Assessment   Prognosis Good   Problem List Decreased strength;Decreased range of motion;Decreased endurance; Impaired balance;Decreased mobility; Impaired judgement;Decreased safety awareness; Impaired vision;Decreased skin integrity;Orthopedic restrictions; Obesity  (Pt reports wanting to see and audiologist as an Outpt)   Assessment Pt is discharged from skilled PT effective today, 2/11/20  Pt remains on [facility] Unit with plans to return to home alone tomorrow, 2/12/20  Since 2/7/20 PT Fabian,  pt was seen for 4 skilled PT tx sessions for  theract, and gait/elevation training  Pt with excellent progress in skilled PT, this past week   Improvement assessed with: Barthel Index Score, functional strength, balance, activity tolerance, safety, bed mobility, transfers, and gait/elevations  All STG's achieved  Please see grid below and flowsheet, for details on pt's functional mobility status at discharge  Barriers to Discharge Decreased caregiver support; Inaccessible home environment  (No social support system; children live far away)   Goals   Patient Goals   ("Get rid of this boot and be completely Independent")   STG Expiration Date 02/19/20   Short Term Goal #1 See grid   PT Treatment Day 4   Plan   Treatment/Interventions ADL retraining;Functional transfer training;LE strengthening/ROM; Elevations; Therapeutic exercise; Endurance training;Cognitive reorientation;Patient/family training;Equipment eval/education; Bed mobility;Gait training; Compensatory technique education;Continued evaluation;Spoke to MD;Spoke to nursing;Spoke to case management;Spoke to advanced practitioner;OT;Family   PT Frequency   (5 to 7x/week)   Recommendation   Recommendation Home PT;D/C PT;Home independently   Equipment Recommended   (Pt has all necessary DME)   PT - OK to Discharge Yes   Additional Comments Pt with plans to return to home tomorrow   Barthel Index   Feeding 10   Bathing 5  (Per pt report)   Grooming Score 5   Dressing Score 10   Bladder Score 10   Bowels Score 10   Toilet Use Score 10   Transfers (Bed/Chair) Score 15   Mobility (Level Surface) Score 15   Stairs Score 10   Barthel Index Score 100       Goals    STG achieved within 1 to 1 5 weeks Performance at Initial Evaluation (2/7/20) Current Performance (last date completed)   Supine to sit transitions to increase ease of transfer and allow pt to get out of bed,    MOD I     ACHIEVED Supervision  HOB flat, no rails 2/11/20   Sit to supine transitions to increase ease of transfer and allow pt to get back into bed MOD I     ACHIEVED       Supervision  HOB flat, no rails 2/11/20   Functional transfers to facilitate safe return to previous living environment     MOD I     ACHIEVED Supervision  Sit <->stand transfers and SPT's with RW 2/11/20   Ambulation with RW > or = 350 feet for safe household and community distance ambulation    MOD I     ACHIEVED Ambulated with a RW for 350 feet with Supervision 2/11/20   Ascend/descend 7 steps with right handrail up, with folded RW ( or most appropriate method ( to allow pt to safely enter/exit home )       MOD I     ACHIEVED Not performed    2/11/20   Ascend/descend a curb step, with RW, ( to allow for safe community re-entry)    MOD I     ACHIEVED 1 + 1 curb steps with RW and CG A 2/11/20   Improve standing functional balance to allow for pt to  object from floor      MOD I     ACHIEVED Not performed 2/11/20  Good (-) dynamic stand balance: picked pen off floor with MOD I            Vitals  Seated at rest: 129/79, HR 92, SPO2 (RA) 94%     After ambulating with a RW for 550 feet (seated) , SPO2 (RA) 92%     After negotiating 8 steps with RHR up and folded RW ( seated): , SPO2 (RA) 94%     After ambulating with a RW for 377 feet (seated): /90, , SPO2 (RA) 95%

## 2020-02-11 NOTE — DISCHARGE SUMMARY
Discharge- David Bach 1960, 62 y o  female MRN: [id number]    Unit/Bed#: -01 Encounter: [id number]    Primary Care Provider: Glory Arambula MD   Date and time admitted to hospital: 2/6/2020 12:00 PM        * Acute pulmonary embolism with acute cor pulmonale [facility])  Assessment & Plan  [facility] [address] [facility] after diagnosis of acute pulmonary embolism, status post PE lysis  And acute DVT on left lower extremity starting from the distal femoral vein  Acute PE secondary to recent surgery on the left leg ORIF Lisfranc  Patient was started on heparin drip, transitioned to Coumadin  Discussed with the patient at length  She is requires strong dietary compliance and high dose of Coumadin in order to maintain her INR  She requires to be on anticoagulation for at least 6 months  Will require frequent blood work monitoring and concern for her getting subtherapeutic on Coumadin  Will switch her to Eliquis and see how she does  Patient is agreeable  She understands that this is a gray zone for using Eliquis in somebody her weight but is ready to try it out  Discussed with the patient that if she has a failure on Eliquis then she will have no option but to be on Coumadin  So far she had handled Eliquis well with no complications  Recurrent major depressive disorder, in remission [facility])  Assessment & Plan  Stable on Seroquel  Dislocation of tarsometatarsal joint of foot, left, sequela  Assessment & Plan  Dislocation of tarsometatarsal joint of left foot s/p ORIF on 12/16/19  Continue PT/OT  Pain controlled  Continue Tylenol for pain control    Morbid obesity due to excess calories ([address] )  Assessment & Plan  Lifestyle modification encouraged including diet exercise and lifestyle modification  BMI is 50 38  Encourage weight loss    Hypertensive heart disease without heart failure  Assessment & Plan  Blood pressure is well controlled    Continue home medication hydrochlorothiazide 50 mg daily  Type 2 diabetes mellitus without complication, without long-term current use of insulin [facility])  Assessment & Plan    Lab Results   Component Value Date    HGBA1C 6 7 (H) 01/21/2020   cont  metformin  Blood sugars well controlled        Discharging Physician / Practitioner: Shane Grewal MD  PCP: Misael Mcgarry MD  Admission Date:   Admission Orders (From admission, onward)     Ordered        02/06/20 1429  Admit Patient to  Once                   Discharge Date: 02/11/20    Resolved Problems  Date Reviewed: 2/11/2020    None          Consultations During Hospital Stay:  · Physical therapy and occupational therapy    Procedures Performed:   · None    Significant Findings / Test Results:   · None    Incidental Findings:   · None     Test Results Pending at Discharge (will require follow up): None     Outpatient Tests Requested:  · CBC and CMP in 2 weeks    Complications:  None    Reason for Admission:  Difficulty walking    Hospital Course:     Patrica Lopez is a 62 y o  female patient who originally presented to the hospital on 2/6/2020 due to difficulty walking  Patient recently was found have a provoked pulmonary embolism and DVT of the left leg after she had ankle surgery done  She also had catheter embolization done  Initially she was placed on heparin drip and Coumadin and transition to rehab  Patient requires very large dose of Coumadin and her INRs are very labile  I discussed with the patient and I transition her to Eliqu which she has been handling well  She is now being discharged home in stable condition advised to take anticoagulation for at least 6 months and only discontinued under the supervision of her pulmonologist     Please see above list of diagnoses and related plan for additional information       Condition at Discharge: good     Discharge Day Visit / Exam:     Subjective:  Patient denies any chest pain or shortness of breath or abdominal pain   She has some swelling in her left leg which is residual from DVT but is getting slowly better  Vitals: Blood Pressure: 114/80 (02/11/20 1459)  Pulse: 94 (02/11/20 1459)  Temperature: 97 5 °F (36 4 °C) (02/11/20 1459)  Temp Source: Temporal (02/11/20 1459)  Respirations: 18 (02/11/20 1459)  Height: 5' 8" (172 7 cm) (02/06/20 1228)  Weight - Scale: (!) 150 kg (331 lb 5 6 oz) (02/08/20 0511)  SpO2: 94 % (02/11/20 1459)  Exam:   Physical Exam   Constitutional: She is oriented to person, place, and time  She appears well-developed and well-nourished  HENT:   Head: Normocephalic and atraumatic  Right Ear: External ear normal    Left Ear: External ear normal    Mouth/Throat: Oropharynx is clear and moist    Eyes: Pupils are equal, round, and reactive to light  Conjunctivae and EOM are normal    Neck: Normal range of motion  Neck supple  Cardiovascular: Normal rate, regular rhythm and normal heart sounds  Pulmonary/Chest: Effort normal and breath sounds normal    Abdominal: Soft  Bowel sounds are normal  She exhibits no mass  There is no tenderness  There is no rebound and no guarding  Genitourinary:   Genitourinary Comments: deferred   Musculoskeletal: She exhibits edema  Neurological: She is alert and oriented to person, place, and time  She has normal reflexes  Cranial nerves 2-12 are normal   Normal neurological exam   Skin: Skin is warm and dry  No rash noted  Psychiatric: She has a normal mood and affect  Nursing note and vitals reviewed  Discussion with Family:  None    Discharge instructions/Information to patient and family:   See after visit summary for information provided to patient and family  Provisions for Follow-Up Care:  See after visit summary for information related to follow-up care and any pertinent home health orders        Disposition:     Home with [facility]NA Services (Reminder: Complete face to face encounter)    For Discharges to [facility]:   · Not Applicable to this Patient - Not Applicable to this Patient    Planned Readmission:  None     Discharge Statement:  I spent 35 minutes discharging the patient  This time was spent on the day of discharge  I had direct contact with the patient on the day of discharge  Greater than 50% of the total time was spent examining patient, answering all patient questions, arranging and discussing plan of care with patient as well as directly providing post-discharge instructions  Additional time then spent on discharge activities  Discharge Medications:  See after visit summary for reconciled discharge medications provided to patient and family        ** Please Note: This note has been constructed using a voice recognition system **

## 2020-02-11 NOTE — DISCHARGE INSTR - AVS FIRST PAGE
Important instructions:    Please take Eliquis total of 10 mg (2 pills) twice daily for 4 more days  And then start Eliquis 5 mg (1 pill) twice daily for 6 months

## 2020-02-11 NOTE — ASSESSMENT & PLAN NOTE
Dislocation of tarsometatarsal joint of left foot s/p ORIF on 12/16/19  Continue PT/OT  Pain controlled  Continue Tylenol for pain control

## 2020-02-11 NOTE — ASSESSMENT & PLAN NOTE
Patient was discharged from [facility] after diagnosis of acute pulmonary embolism, status post PE lysis  And acute DVT on left lower extremity starting from the distal femoral vein  Acute PE secondary to recent surgery on the left leg ORIF Jory  Patient was started on heparin drip, transitioned to Coumadin  Discussed with the patient at length  She is requires strong dietary compliance and high dose of Coumadin in order to maintain her INR  She requires to be on anticoagulation for at least 6 months  Will require frequent blood work monitoring and concern for her getting subtherapeutic on Coumadin  Will switch her to Eliquis and see how she does  Patient is agreeable  She understands that this is a gray zone for using Eliquis in somebody her weight but is ready to try it out  Discussed with the patient that if she has a failure on Eliquis then she will have no option but to be on Coumadin  So far she had handled Eliquis well with no complications

## 2020-02-11 NOTE — PLAN OF CARE
Problem: OCCUPATIONAL THERAPY ADULT  Goal: Performs self-care activities at highest level of function for planned discharge setting  See evaluation for individualized goals  Description  Treatment Interventions: ADL retraining, Functional transfer training, UE strengthening/ROM, Endurance training, Patient/family training, Equipment evaluation/education, Neuromuscular reeducation, Compensatory technique education, Energy conservation, Activityengagement          See flowsheet documentation for full assessment, interventions and recommendations      Outcome: Completed  Note:   Limitation: Decreased ADL status, Decreased UE ROM, Decreased UE strength, Decreased Safe judgement during ADL, Decreased endurance, Decreased self-care trans, Decreased high-level ADLs  Prognosis: Good  Assessment: see note/dc summary     OT Discharge Recommendation: Home OT  OT - OK to Discharge: No

## 2020-02-12 VITALS
HEIGHT: 68 IN | RESPIRATION RATE: 20 BRPM | BODY MASS INDEX: 44.41 KG/M2 | OXYGEN SATURATION: 93 % | TEMPERATURE: 98.1 F | SYSTOLIC BLOOD PRESSURE: 134 MMHG | HEART RATE: 91 BPM | WEIGHT: 293 LBS | DIASTOLIC BLOOD PRESSURE: 81 MMHG

## 2020-02-12 RX ADMIN — APIXABAN 5 MG: 5 TABLET, FILM COATED ORAL at 08:00

## 2020-02-12 RX ADMIN — METFORMIN HYDROCHLORIDE 500 MG: 500 TABLET ORAL at 07:55

## 2020-02-12 RX ADMIN — METFORMIN HYDROCHLORIDE 500 MG: 500 TABLET ORAL at 16:04

## 2020-02-12 RX ADMIN — DOCUSATE SODIUM 100 MG: 100 CAPSULE, LIQUID FILLED ORAL at 08:00

## 2020-02-12 RX ADMIN — DOCUSATE SODIUM 100 MG: 100 CAPSULE, LIQUID FILLED ORAL at 17:12

## 2020-02-12 RX ADMIN — CALCIUM 1 TABLET: 500 TABLET ORAL at 07:55

## 2020-02-12 RX ADMIN — Medication 1 TABLET: at 08:00

## 2020-02-12 RX ADMIN — APIXABAN 5 MG: 5 TABLET, FILM COATED ORAL at 17:12

## 2020-02-12 NOTE — DISCHARGE INSTRUCTIONS
Pulmonary Embolism   WHAT YOU NEED TO KNOW:   A pulmonary embolism (PE) is the sudden blockage of a blood vessel in the lungs by an embolus  An embolus is a small piece of blood clot, fat, air, or tumor cells  The embolus cuts off the blood supply to your lungs  A pulmonary embolism can become life-threatening  DISCHARGE INSTRUCTIONS:   Call 911 for any of the following:   · You feel lightheaded, short of breath, and have chest pain  · You cough up blood  · You have a seizure  · You have slurred speech, increased sleepiness, or problems seeing, talking, or thinking  · You have weakness or cannot move your arm or leg on one side of your body  Seek care immediately if:   · You feel faint  · You have a severe headache  · Your heart is beating faster than normal   Contact your healthcare provider if:   · The skin on any part of your legs or hips turns purple  · Your gums or nose bleed  · You see blood in your urine or bowel movements  · Your bowel movements are black or darker than normal     · You have questions or concerns about your condition or care  Medicines:   · Blood thinners  help treat the PE and prevent new clots from forming  Examples of blood thinners include heparin, rivaroxaban, apixiban, and warfarin  The following are general safety guidelines to follow while you are taking a blood thinner:     ¨ Watch for bleeding and bruising  Watch for bleeding from your gums or nose  Watch for blood in your urine and bowel movements  Use a soft washcloth on your skin, and a soft toothbrush to brush your teeth  This can keep your skin and gums from bleeding  If you shave, use an electric shaver  Do not play contact sports  ¨ Tell your dentist and other healthcare providers that you take a blood thinner  Wear a bracelet or necklace that says you take this medicine  ¨ Do not start or stop any medicines unless your healthcare provider tells you to   Many medicines cannot be used with blood thinners  ¨ Tell your healthcare provider right away if you forget to take the blood thinner , or if you take too much  ¨ Warfarin  is a blood thinner that you may need to take  The following are additional things you should be aware of if you take warfarin:    § Foods and medicines can affect the amount of warfarin in your blood  Do not make major changes to your diet  Warfarin works best when you eat about the same amount of vitamin K every day  Vitamin K is found in green leafy vegetables and certain other foods  Ask for more information about what to eat or not to eat  § You will need to see your healthcare provider for follow-up visits  You will need regular blood tests to decide how much warfarin you need  · Take your medicine as directed  Contact your healthcare provider if you think your medicine is not helping or if you have side effects  Tell him or her if you are allergic to any medicine  Keep a list of the medicines, vitamins, and herbs you take  Include the amounts, and when and why you take them  Bring the list or the pill bottles to follow-up visits  Carry your medicine list with you in case of an emergency  Prevent another PE:   · Wear pressure stockings  The stockings are tight and put pressure on your legs  This improves blood flow and helps prevent clots  Wear the stockings during the day  Do not wear them when you sleep  · Exercise regularly  Ask about the best exercise plan for you  When you travel by car or work at a desk, take breaks to stand up and move around as much as possible  Rotate your feet in circles often if you sit for a long period of time  · Maintain a healthy weight  Ask your healthcare provider how much you should weigh  Ask him to help you create a weight loss plan if you are overweight  · Do not smoke  Nicotine and other chemicals in cigarettes and cigars can damage blood vessels and increase your risk for another PE   Ask your healthcare provider for information if you currently smoke and need help to quit  E-cigarettes or smokeless tobacco still contain nicotine  Talk to your healthcare provider before you use these products  Follow up with your healthcare provider as directed:  Write down your questions so you remember to ask them during your visits  © 2017 [address] Information is for End User's use only and may not be sold, redistributed or otherwise used for commercial purposes  All illustrations and images included in CareNotes® are the copyrighted property of [employer]  or Christopher Arreola  The above information is an  only  It is not intended as medical advice for individual conditions or treatments  Talk to your doctor, nurse or pharmacist before following any medical regimen to see if it is safe and effective for you

## 2020-02-12 NOTE — SOCIAL WORK
Pt medically cleared for discharge home today and pt is agreeable to discharge home today  No [address] needed as pt does not have medicare  Transfer letter placed in discharge folder, copy placed in scan bin  Pt was accepted with [facility] for RN/PT/OT services

## 2020-02-12 NOTE — UTILIZATION REVIEW
AUTH# [id number]      Pt  Being discharged to day 2/12/20 to home with FEDERICO Luz MD   Physician   Hospitalist   Discharge Summary   Signed   Date of Service:  2/11/2020  4:27 PM               Signed                   Discharge- Corazon Velasquez 1960, 62 y o  female MRN: [id number]     Unit/Bed#: -01 Encounter: [id number]     Primary Care Provider: Remington Westfall MD   Date and time admitted to hospital: 2/6/2020 12:00 PM           * Acute pulmonary embolism with acute cor pulmonale [facility])  Assessment & Plan  Patient was discharged from [address] after diagnosis of acute pulmonary embolism, status post PE lysis  And acute DVT on left lower extremity starting from the distal femoral vein  Acute PE secondary to recent surgery on the left leg ORIF Lisfranc  Patient was started on heparin drip, transitioned to Coumadin      Discussed with the patient at length  She is requires strong dietary compliance and high dose of Coumadin in order to maintain her INR  She requires to be on anticoagulation for at least 6 months  Will require frequent blood work monitoring and concern for her getting subtherapeutic on Coumadin  Will switch her to Eliquis and see how she does  Patient is agreeable  She understands that this is a gray zone for using Eliquis in somebody her weight but is ready to try it out      Discussed with the patient that if she has a failure on Eliquis then she will have no option but to be on Coumadin    So far she had handled Eliquis well with no complications         Recurrent major depressive disorder, in remission [facility])  Assessment & Plan  Stable on Seroquel      Dislocation of tarsometatarsal joint of foot, left, sequela  Assessment & Plan  Dislocation of tarsometatarsal joint of left foot s/p ORIF on 12/16/19  Continue PT/OT  Pain controlled  Continue Tylenol for pain control     Morbid obesity due to excess calories [facility])  Assessment & Plan  Lifestyle modification encouraged including diet exercise and lifestyle modification  BMI is 50 38  Encourage weight loss     Hypertensive heart disease without heart failure  Assessment & Plan  Blood pressure is well controlled  Continue home medication hydrochlorothiazide 50 mg daily       Type 2 diabetes mellitus without complication, without long-term current use of insulin [facility])  Assessment & Plan           Lab Results   Component Value Date     HGBA1C 6 7 (H) 01/21/2020   cont  metformin  Blood sugars well controlled           Discharging Physician / Practitioner: Madeline Rodriguez MD  PCP: Wyatt Andrews MD  Admission Date:       Admission Orders (From admission, onward)              Ordered          02/06/20 1429   Admit Patient to  Once                       Discharge Date: 02/11/20          Resolved Problems  Date Reviewed: 2/11/2020     None             Consultations During Hospital Stay:  · Physical therapy and occupational therapy     Procedures Performed:   · None     Significant Findings / Test Results:   · None     Incidental Findings:   · None      Test Results Pending at Discharge (will require follow up): None     Outpatient Tests Requested:  · CBC and CMP in 2 weeks     Complications:  None     Reason for Admission:  Difficulty walking     Hospital Course:      Scott Yates is a 62 y o  female patient who originally presented to the hospital on 2/6/2020 due to difficulty walking  Patient recently was found have a provoked pulmonary embolism and DVT of the left leg after she had ankle surgery done  She also had catheter embolization done  Initially she was placed on heparin drip and Coumadin and transition to rehab  Patient requires very large dose of Coumadin and her INRs are very labile  I discussed with the patient and I transition her to Eliquis which she has been handling well    She is now being discharged home in stable condition advised to take anticoagulation for at least 6 months and only discontinued under the supervision of her pulmonologist      Please see above list of diagnoses and related plan for additional information       Condition at Discharge: good      Discharge Day Visit / Exam:      Subjective:  Patient denies any chest pain or shortness of breath or abdominal pain  She has some swelling in her left leg which is residual from DVT but is getting slowly better  Vitals: Blood Pressure: 114/80 (02/11/20 1459)  Pulse: 94 (02/11/20 1459)  Temperature: 97 5 °F (36 4 °C) (02/11/20 1459)  Temp Source: Temporal (02/11/20 1459)  Respirations: 18 (02/11/20 1459)  Height: 5' 8" (172 7 cm) (02/06/20 1228)  Weight - Scale: (!) 150 kg (331 lb 5 6 oz) (02/08/20 0511)  SpO2: 94 % (02/11/20 1459)  Exam:   Physical Exam   Constitutional: She is oriented to person, place, and time  She appears well-developed and well-nourished  HENT:   Head: Normocephalic and atraumatic  Right Ear: External ear normal    Left Ear: External ear normal    Mouth/Throat: Oropharynx is clear and moist    Eyes: Pupils are equal, round, and reactive to light  Conjunctivae and EOM are normal    Neck: Normal range of motion  Neck supple  Cardiovascular: Normal rate, regular rhythm and normal heart sounds  Pulmonary/Chest: Effort normal and breath sounds normal    Abdominal: Soft  Bowel sounds are normal  She exhibits no mass  There is no tenderness  There is no rebound and no guarding  Genitourinary:   Genitourinary Comments: deferred   Musculoskeletal: She exhibits edema  Neurological: She is alert and oriented to person, place, and time  She has normal reflexes  Cranial nerves 2-12 are normal   Normal neurological exam   Skin: Skin is warm and dry  No rash noted  Psychiatric: She has a normal mood and affect     Nursing note and vitals reviewed         Discussion with Family:  None     Discharge instructions/Information to patient and family:   See after visit summary for information provided to patient and family        Provisions for Follow-Up Care:  See after visit summary for information related to follow-up care and any pertinent home health orders        Disposition:      Home with VNA Services (Reminder: Complete face to face encounter)     For Discharges to [facility]:   · Not Applicable to this Patient - Not Applicable to this Patient     Planned Readmission:  None     Discharge Statement:  I spent 35 minutes discharging the patient  This time was spent on the day of discharge  I had direct contact with the patient on the day of discharge  Greater than 50% of the total time was spent examining patient, answering all patient questions, arranging and discussing plan of care with patient as well as directly providing post-discharge instructions  Additional time then spent on discharge activities      Discharge Medications:  See after visit summary for reconciled discharge medications provided to patient and family        ** Please Note: This note has been constructed using a voice recognition system **                              Mary Medina PT   Physical Therapist   Physical Therapy   Physical Therapy Note   Signed   Date of Service:  2/11/2020  2:00 PM               Signed             Physical Therapy Daily Treatment Note and Discharge Summary          02/11/20: 54 minutes (14:00 to 14:54)   Pain Assessment   Pain Assessment No/denies pain   Pain Score No Pain   Restrictions/Precautions   Weight Bearing Precautions Per Order Yes   LLE Weight Bearing Per Order WBAT   Braces or Orthoses CAM Boot   Other Precautions Pain; Fall Risk;Hard of hearing;Visual impairment  (Wears reading glasses)   General   Family/Caregiver Present No   Cognition   Overall Cognitive Status WFL   Arousal/Participation Cooperative   Attention Within functional limits   Orientation Level Oriented X4   Memory Within functional limits   Following Commands Follows all commands and directions without difficulty   Bed Mobility   Rolling R 7  Independent   Rolling L 7  Independent   Supine to Sit 7  Independent   Sit to Supine 7  Independent   Additional Comments Decreased time and effort  (Bed Mobility: HOB flat, no rail)   Transfers   Sit to Stand 6  Modified independent   Additional items Increased time required  (Occassionally uses inappropropriate hand placement; but still  appears safe/steady)   Stand to Sit 6  Modified independent  (+ controlled descent)   Additional items Increased time required  (Occassionally uses inappropropriate hand placement; but still  appears safe/steady)      Stand pivot 6  Modified independent  (SPT with RW and SPT with no AD)   Additional items Increased time required  (Occassionally abandons RW, but still appears safe and steady)   Toilet transfer 6  Modified independent  (Sit <->stand and SPT with RW)   Additional items Increased time required  (Good RW management)   Car transfer 6  Modified independent  (Removed footboard on bed for simulated car transfer)   Additional items    (Lifted BLE's over simulated car wall, without use of hands)   Additional Comments (continued): low unsecured chair, highback chair  (Transfers: EOB, recliner, toilet, low couch, armchair)   Ambulation/Elevation   Gait pattern    (Slight limping due to +LLD from CAM boot), forward flexion ( cues to stay closer to RW)   Gait Assistance 6  Modified independent   Additional items    (+ SOB on exertion)   Assistive Device Rolling walker   Distance 550 feet, 377 feet, 25 feet x 2, 18 feet x 2  (Negotiated multiple turns/obstacles in pathway)   Stair Management Assistance 6  Modified independent   Additional items Increased time required; Other (Comment)  (Pt able to fold and unfold RW Independently )   Stair Management Technique Step to pattern; Foreward; One rail R  (Folded RW parallel to steps; with RHR up)   Number of Stairs 8  (Safe and steady on steps; good sequencing)   Ramp Technique Forward; No rails   Ramp Assistance 6 Modified independent  (Ascended/descended ramp with RW and MOD I)   Additional items Increased time required  (Safe and steady on ramp)   Curbs 1+ 1 curb steps with RW and MOD I  (Good RW management)   Balance   Static Sitting Normal   Dynamic Sitting    (Good (-))   Static Standing    (Good with RW)   Dynamic Standing    (Good (-) with RW) -> picked pen off floor with MOD I   Ambulatory    (Good (-) with RW)   Higher level balance    (G (-) with RW: I toileting act ( clothing and hygiene))   Activity Tolerance   Activity Tolerance Patient limited by fatigue   Assessment   Prognosis Good   Problem List Decreased strength;Decreased range of motion;Decreased endurance; Impaired balance;Decreased mobility; Impaired judgement;Decreased safety awareness; Impaired vision;Decreased skin integrity;Orthopedic restrictions; Obesity  (Pt reports wanting to see and audiologist as an Outpt)   Assessment Pt is discharged from skilled PT effective today, 2/11/20  Pt remains on [facility] Unit with plans to return to home alone tomorrow, 2/12/20  Since 2/7/20 PT Fabian,  pt was seen for 4 skilled PT tx sessions for  theract, and gait/elevation training  Pt with excellent progress in skilled PT, this past week  Improvement assessed with: Barthel Index Score, functional strength, balance, activity tolerance, safety, bed mobility, transfers, and gait/elevations  All STG's achieved  Please see grid below and flowsheet, for details on pt's functional mobility status at discharge  Barriers to Discharge Decreased caregiver support; Inaccessible home environment  (No social support system; children live far away)   Goals   Patient Goals    ("Get rid of this boot and be completely Independent")   STG Expiration Date 02/19/20   Short Term Goal #1 See grid   PT Treatment Day 4   Plan   Treatment/Interventions ADL retraining;Functional transfer training;LE strengthening/ROM; Elevations; Therapeutic exercise; Endurance training;Cognitive reorientation;Patient/family training;Equipment eval/education; Bed mobility;Gait training; Compensatory technique education;Continued evaluation;Spoke to MD;Spoke to nursing;Spoke to case management;Spoke to advanced practitioner;OT;Family   PT Frequency    (5 to 7x/week)   Recommendation   Recommendation Home PT;D/C PT;Home independently   Equipment Recommended    (Pt has all necessary DME)   PT - OK to Discharge Yes   Additional Comments Pt with plans to return to home tomorrow   Barthel Index   Feeding 10   Bathing 5  (Per pt report)   Grooming Score 5   Dressing Score 10   Bladder Score 10   Bowels Score 10   Toilet Use Score 10   Transfers (Bed/Chair) Score 15   Mobility (Level Surface) Score 15   Stairs Score 10   Barthel Index Score 100         Goals    STG achieved within 1 to 1 5 weeks Performance at Initial Evaluation (2/7/20) Current Performance (last date completed)   Supine to sit transitions to increase ease of transfer and allow pt to get out of bed,    MOD I     ACHIEVED Supervision  HOB flat, no rails 2/11/20   Sit to supine transitions to increase ease of transfer and allow pt to get back into bed MOD I     ACHIEVED       Supervision  HOB flat, no rails 2/11/20   Functional transfers to facilitate safe return to previous living environment     MOD I     ACHIEVED Supervision  Sit <->stand transfers and SPT's with RW 2/11/20   Ambulation with RW > or = 350 feet for safe household and community distance ambulation    MOD I     ACHIEVED Ambulated with a RW for 350 feet with Supervision 2/11/20   Ascend/descend 7 steps with right handrail up, with folded RW ( or most appropriate method ( to allow pt to safely enter/exit home )       MOD I     ACHIEVED Not performed    2/11/20   Ascend/descend a curb step, with RW, ( to allow for safe community re-entry)    MOD I     ACHIEVED 1 + 1 curb steps with RW and CG A 2/11/20   Improve standing functional balance to allow for pt to  object from floor      MOD I     ACHIEVED Not performed 2/11/20  Good (-) dynamic stand balance: picked pen off floor with MOD I            Vitals  Seated at rest: 129/79, HR 92, SPO2 (RA) 94%     After ambulating with a RW for 550 feet (seated) , SPO2 (RA) 92%     After negotiating 8 steps with RHR up and folded RW ( seated): , SPO2 (RA) 94%     After ambulating with a RW for 377 feet (seated): /90, , SPO2 (RA) 95%                                             Santana Lee, OT   Occupational Therapist   Occupational Therapy   Occupational Therapy Note   Signed   Date of Service:  2/11/2020  8:50 AM               Signed                OccupationalTherapy Treatment Note + Discharge Summary     Patient Name: Reese Saucedo  Today's Date: 2/11/2020  Problem List  Principal Problem:    Acute pulmonary embolism with acute cor pulmonale ([facility] )  Active Problems:    Type 2 diabetes mellitus without complication, without long-term current use of insulin ([facility] )    Hypertensive heart disease without heart failure    Morbid obesity due to excess calories (H[facility])    Dislocation of tarsometatarsal joint of foot, left, sequela    Recurrent major depressive disorder, in remission ([facility] )     TIME: 3499-0779 (54 mins)  VITALS: stable throughout  PAIN: 2/10 L foot/knee, improved w/ activity  COGNITION: WFL  PRECAUTIONS: WBAT LLE + CAM boot; fall risk       EXPIRATION DATE: 2/20/2020  TREATMENT DAY: 5  DISCHARGE RECOMMENDATION: Home OT  ADDITIONAL COMMENTS: RN cleared pt for OT tx  Remains seated in bedside chair w/ all needs at end of session         TREATMENT SESSION: Patient seen this date for OT with focus on goals as set by OTR/L  Emphasis on ADL retraining, stand balance, activity tolerance, and strengthening  Patient complete dressing and bathing tasks - completed all UB and LB ADLs w/ MI and increased time this date   Appearing SOB w/ activity/bending forward to don/doff pants, underwear and CAM boot and able to self initiate rest breaks as need  Patient states "it's because I have a large belly" and appears motivated to return home  Pt verbalized need to use bathroom, and completed toileting w/ MI  Pt completed grooming standing at sinkside with MI  Overall, patient demos improvements in the areas of endurance, stand tolerance, balance, activity tolerance, and improved independence with all ADLs  D/C from OT today            Occupational Therapy Discharge Summary     Disposition: Patient will be discharged from skilled OT interventions effective 2/11/2020  Pt will be d/c home independently in next few days (pending medical status/trial w/ Eliquis) with home OT/PT services      AE/DME:  Pt owns kneeling scooter (does not plan to use), crutches (does not plan to use), RW (plans to use), GBs (around raised toilet seat and walk in shower), shower chair, and SPC  Patient additionally purchased RW tray and side basket which are anticipated to deliver to her home by the end of this week       Discharge Summary: Pt participated in 5/5 OT sessions  Pt achieved all goals  Deficits remain in pain at times, activity tolerance (large body habitus - states plans to make lifestyle changes when home re: exercise and diet)  Emphasis of OT sessions on functional ADLs, therapeutic exercises and therapeutic activities to maximize endurance, balance, activity tolerance, pain tolerance, safety, judgment, attention, sequencing and independence with ADLS, IADLs, functional transfers and mobility  Throughout stay, patient extensively educated on safe functional transfer techniques, appropriate use of DME/AE to improve functional performance, and activity modification techniques for energy conservation for safe participation with ADLs, IADLs and functional transfers/mobility  Pt denies concerns with d/c home  Pt will have VNA services, home OT/PT to ensure safety in home environment  OT met with pt, pt denies questions/concerns for OT at this time   Patient states family will provide transport at D/C, live about 1 hour away   Patient also stated today that she is going to try and sell her apt she currently lives in and move closer to her sons [facility])      Recommendations: OT recommends pt return home independently with home OT services focusing on noted deficits in order to maximize safety and independence during occupational performance                GOALS:     Goals STG achieved within 2 weeks Performance at Initial Evaluation 2/6/2020  Current Performance (last date completed)   Grooming while standing at sink MI  (2/8, 2/10)  MET S 2/10, 2/8 MI  2/7 S/MI with good tolerance and no LOB  2/6 S   ADL transfers  MI  (2/8, 2/10)  MET c/S 2/10, 2/8 MI STS and SPT  2/7 S/MI + CAM boot and WBAT   Good safety noted and no LOB + RW  2/6 c/S   Bathroom mobility with appropriate AD MI  (2/8, 2/10)  MET c/S 2/10, 2/8 MI + RW  2/7 S/MI + RW and Boot  2/6 c/S   UB ADL  MI  (2/7, 2/8, 2/10)  MET S 2/10 reports MI w/ ADL this morning  2/8 reports completion on own this date with nursing aide confirming   2/7 Independent bathing/dressing  2/6 S   LB ADL, AE PRN + CAM BOOT  MI  (2/7, 2/8, 2/10)  MET Mod A- No cam boot 2/10 MI w/ increased time including CAM boot  2/8 reports completion on own this date except for CAM boot (nursing confirms)-- CAM boot management min assist (see assessment for further details)  2/7 MI, increased time and SOB noted   Ed on energy conservation-- sat on toilet as she would at home     2/6 Mod A - no cam boot    Toileting/clothing management and hygiene MI  (2/7, 2/8)  MET Min A 2/8 MI CM and hygiene  2/7 MI CM and hygiene  2/6 Min A   Dynamic standing balance for increased safety when completing purposeful tasks F+  (2/7, 2/8, 2/10)  MET F-  2/10 F+/G-  2/8 Fair+  2/6 F-   Increase standing tolerance for inc'd safety with standing purposeful tasks 5-8 min   (2/7, 2/8, 2/10)  MET 3 min  2/10, 2/8 12 min with activity and mobility   2/7 4-5 min   2/6 3 min    Participate in therex 1-3x/week for inc'd overall stamina/activity tolerance for purposeful tasks To be completed  (2/7, 2/10)  MET   2/10 Red theraband HEP - in room  2/7 with AM ADL    shower stall transfer 4-5 inch threshold (+) GB's (+) chair  MI  (2/10)  MET CGA  2/10 MI  2/7 Supervision + GB  2/6 CGA (+) GBs    Kitchen mobility for inc'd independence and safety negotiating kitchen environment MI  (2/8, 2/10)  MET Unable to assess 2/10 MI w/ RW - ordered tray today for home  2/8 MI + RW-- DME ed along with additional recommendations to increase overall safety with RW positioning   2/6 Unable to assess   Household management (laundry/cleaning) MI  (2/8, 2/10)  MET Unable to assess 2/10 MI w/ RW and tray  2/8 MI laundry   Discussed recommendations to place basket on cedar chest for increased reach and energy conservation but did demo ability to  from floor and manage   Load/unload washer/dryer and hang and transport with tray  2/6 Unable to assess               Miles Jaimes, MS, OTR/L

## 2020-02-25 ENCOUNTER — OFFICE VISIT (OUTPATIENT)
Dept: PULMONOLOGY | Facility: CLINIC | Age: 60
End: 2020-02-25
Payer: COMMERCIAL

## 2020-02-25 VITALS
HEIGHT: 68 IN | BODY MASS INDEX: 44.41 KG/M2 | HEART RATE: 90 BPM | OXYGEN SATURATION: 94 % | SYSTOLIC BLOOD PRESSURE: 140 MMHG | TEMPERATURE: 98 F | WEIGHT: 293 LBS | DIASTOLIC BLOOD PRESSURE: 88 MMHG

## 2020-02-25 DIAGNOSIS — G47.33 OSA (OBSTRUCTIVE SLEEP APNEA): ICD-10-CM

## 2020-02-25 DIAGNOSIS — E66.01 MORBID OBESITY (HCC): ICD-10-CM

## 2020-02-25 DIAGNOSIS — I27.20 PULMONARY HYPERTENSION (HCC): ICD-10-CM

## 2020-02-25 DIAGNOSIS — I26.02 ACUTE SADDLE PULMONARY EMBOLISM WITH ACUTE COR PULMONALE (HCC): Primary | ICD-10-CM

## 2020-02-25 PROCEDURE — 99215 OFFICE O/P EST HI 40 MIN: CPT | Performed by: INTERNAL MEDICINE

## 2020-02-25 RX ORDER — WARFARIN SODIUM 10 MG/1
TABLET ORAL
Qty: 30 TABLET | Refills: 5 | Status: SHIPPED | OUTPATIENT
Start: 2020-02-25

## 2020-02-25 NOTE — PROGRESS NOTES
Office Progress Note - Pulmonary    Dhara Camarena 56 y o  female MRN: [id number]    Encounter: [id number]      Assessment:   Acute pulmonary embolism   Pulmonary hypertension   Morbid obesity   Obstructive sleep apnea  Plan:    Warfarin 10 mg p o  Daily   Hold the Eliquis on February 29th   PT and INR on March 2nd   Diagnostic polysomnogram, home study   Follow-up in 4 weeks  Discussion:   I had a long discussion with the patient that she is not on the appropriate anticoagulant  After explanation and telling her the risk of recurrence of a massive PE the patient agreed to switch back to warfarin  Her INR took a long wild to become therapeutic  She required 15 mg daily of warfarin  For this reason I have started her on warfarin 10 mg a day  She will take it along with the Eliquis for the next 4 days  She will stop the Eliquis after 4 days and have her INR done on the 7th day  She will call my office and we will adjust the warfarin dose based on the INR  The patient has acute PE was provoked after her left leg surgery  She will need anticoagulation for 6 months  Before we discontinue the anticoagulation I will repeat 2D echo which most likely I will order during the next visit  Also we had a long discussion regarding her obstructive sleep apnea  The patient agreed to have a polysomnogram done  I have ordered diagnostic polysomnogram home study  I will see her in 4 weeks  Subjective: The patient is here for post hospital discharge follow-up  She had a massive acute pulmonary embolism  She had catheter directed lytic therapy  She was initially anticoagulated with warfarin because she has morbid obesity and Eliquis was not an appropriate choice  She was transferred to [place] [id number]  She was transition to Eliquis when the patient insisted that she does not want to remain on Coumadin  The patient was transition to Eliquis  Currently she is on Eliquis b i d  She stated her breathing has improved back to her baseline  She is doing physical therapy at home and feels little winded but nothing compared to which she had when she had acute PE  She denies cough, wheezing or sputum production  Denies any chest pain or palpitations  She has significant snoring with witnessed apneas  She feels tired during the day  Review of systems:  A 12 point system review is done and aside from what is stated above the rest of the review of systems is negative  Family history and social history are reviewed  Medications list is reviewed  Vitals: Blood pressure 140/88, pulse 90, temperature 98 °F (36 7 °C), temperature source Tympanic, height 5' 8" (1 727 m), weight (!) 155 kg (342 lb 9 6 oz), SpO2 94 %  ,     Physical Exam  Gen: Awake, alert, oriented x 3, no acute distress  HEENT: Mucous membranes moist, no oral lesions, no thrush  NECK: No accessory muscle use, JVP not elevated  Cardiac: Regular, single S1, single S2, no murmurs, no rubs, no gallops  Lungs:  Decreased breath sounds  Abdomen: normoactive bowel sounds, soft nontender, nondistended, no rebound or rigidity, no guarding  Extremities: no cyanosis, no clubbing  2+ edema  Left leg still in an air cast   Neuro:  Grossly nonfocal   Skin:  No rash  Lab Results   Component Value Date    INR 3 07 (H) 02/09/2020    INR 3 10 (H) 02/08/2020    INR 2 28 (H) 02/07/2020    PROTIME 33 3 (H) 02/09/2020    PROTIME 33 3 (H) 02/08/2020    PROTIME 24 9 (H) 02/07/2020     Lab Results   Component Value Date    WBC 9 44 02/02/2020    HGB 14 8 02/02/2020    HCT 47 3 (H) 02/02/2020     (H) 02/02/2020     02/02/2020     CT of the chest is reviewed on the [facility] system and it showed large bilateral central pulmonary emboli    This study was done on January 21st

## 2020-02-28 ENCOUNTER — TELEPHONE (OUTPATIENT)
Dept: SLEEP CENTER | Facility: CLINIC | Age: 60
End: 2020-02-28

## 2020-02-28 NOTE — TELEPHONE ENCOUNTER
----- Message from Lex Acevedo DO sent at 2/27/2020  7:35 AM EST -----  Chart reviewed  Study approved  Schedule HST  Dr Renata Aguirre to read  ----- Message -----  From: Griselda Coleman MA  Sent: 2/26/2020  11:44 AM EST  [facility]    This sleep study needs approval      If approved please sign and return to clerical pool  If denied please include reasons why  Also provide alternative testing if warranted  Please sign and return to clerical pool

## 2020-03-10 NOTE — ASSESSMENT & PLAN NOTE
----- Message from Maggie Shannon MA sent at 3/10/2020 10:44 AM CDT -----  Contact: Self   ivan  ----- Message -----  From: Sandie Cedeno  Sent: 3/10/2020  10:05 AM CDT  To: Cheryl ZIMMERMAN Staff    Type: Patient Call Back    Who called: Self     What is the request in detail:patient wanted to let the doctor know she cancelled her because she is still admitted in the hosp     Can the clinic reply by [facility]? No     Would the patient rather a call back or a response via [facility]?  Call     Best call back number:[phone]           Lifestyle modification

## 2020-03-19 ENCOUNTER — HOSPITAL ENCOUNTER (OUTPATIENT)
Dept: SLEEP CENTER | Facility: CLINIC | Age: 60
Discharge: HOME | End: 2020-03-19
Payer: COMMERCIAL

## 2020-03-19 ENCOUNTER — TELEPHONE (OUTPATIENT)
Dept: PULMONOLOGY | Facility: CLINIC | Age: 60
End: 2020-03-19

## 2020-03-19 DIAGNOSIS — G47.33 OSA (OBSTRUCTIVE SLEEP APNEA): ICD-10-CM

## 2020-03-19 PROCEDURE — G0399 HOME SLEEP TEST/TYPE 3 PORTA: HCPCS

## 2020-03-19 PROCEDURE — G0399 HOME SLEEP TEST/TYPE 3 PORTA: HCPCS | Performed by: INTERNAL MEDICINE

## 2020-03-19 NOTE — PROGRESS NOTES
Home Sleep Study Documentation    Pre-Sleep Home Study:    Set-up and instructions performed by: CHELO Correa    Technician performed demonstration for Patient: yes    Return demonstration performed by Patient: yes    Written instructions provided to Patient: yes    Patient signed consent form: yes        Post-Sleep Home Study:    Additional comments by Patient: Very hard to sleep  I pretty much stayed in the same position  Didn't want to mess up the study  Home Sleep Study Failed:no:    Failure reason: N/A    Reported or Detected: N/A    Scored by: KESHAWN Sapp

## 2020-03-22 DIAGNOSIS — G47.33 OSA (OBSTRUCTIVE SLEEP APNEA): Primary | ICD-10-CM

## 2020-03-23 ENCOUNTER — TELEPHONE (OUTPATIENT)
Dept: PULMONOLOGY | Facility: CLINIC | Age: 60
End: 2020-03-23

## 2020-03-23 DIAGNOSIS — I26.02 ACUTE SADDLE PULMONARY EMBOLISM WITH ACUTE COR PULMONALE (HCC): Primary | ICD-10-CM

## 2020-03-24 ENCOUNTER — TELEPHONE (OUTPATIENT)
Dept: PULMONOLOGY | Facility: CLINIC | Age: 60
End: 2020-03-24

## 2020-03-24 NOTE — TELEPHONE ENCOUNTER
I called patient and went over the results of her sleep study with her  Patient is agreeable to move forward with Auto CPAP  Script for CPAP and all needed supplies have been faxed to [facility] at [phone]  Patient will call us in 2 weeks if she does not hear from [facility]

## 2020-03-25 ENCOUNTER — APPOINTMENT (OUTPATIENT)
Dept: LAB | Facility: CLINIC | Age: 60
End: 2020-03-25
Payer: COMMERCIAL

## 2020-03-25 DIAGNOSIS — I26.02 ACUTE SADDLE PULMONARY EMBOLISM WITH ACUTE COR PULMONALE (HCC): ICD-10-CM

## 2020-03-25 LAB
INR PPP: 1.68 (ref 0.84–1.19)
PROTHROMBIN TIME: 19.3 SECONDS (ref 11.6–14.5)

## 2020-03-25 PROCEDURE — 85610 PROTHROMBIN TIME: CPT

## 2020-03-25 PROCEDURE — 36415 COLL VENOUS BLD VENIPUNCTURE: CPT

## 2020-03-27 ENCOUNTER — OFFICE VISIT (OUTPATIENT)
Dept: PULMONOLOGY | Facility: CLINIC | Age: 60
End: 2020-03-27
Payer: COMMERCIAL

## 2020-03-27 VITALS
WEIGHT: 293 LBS | HEIGHT: 68 IN | HEART RATE: 91 BPM | SYSTOLIC BLOOD PRESSURE: 130 MMHG | OXYGEN SATURATION: 97 % | BODY MASS INDEX: 44.41 KG/M2 | DIASTOLIC BLOOD PRESSURE: 82 MMHG | RESPIRATION RATE: 18 BRPM | TEMPERATURE: 98.3 F

## 2020-03-27 DIAGNOSIS — I27.20 PULMONARY HYPERTENSION (HCC): ICD-10-CM

## 2020-03-27 DIAGNOSIS — E66.01 MORBID OBESITY (HCC): ICD-10-CM

## 2020-03-27 DIAGNOSIS — G47.33 OSA (OBSTRUCTIVE SLEEP APNEA): Primary | ICD-10-CM

## 2020-03-27 PROCEDURE — 99214 OFFICE O/P EST MOD 30 MIN: CPT | Performed by: INTERNAL MEDICINE

## 2020-03-27 RX ORDER — WARFARIN SODIUM 5 MG/1
TABLET ORAL
Qty: 30 TABLET | Refills: 5 | Status: SHIPPED | OUTPATIENT
Start: 2020-03-27

## 2020-03-27 NOTE — PROGRESS NOTES
Office Progress Note - Pulmonary    [place] 56 y o  female MRN: [id number]    Encounter: [id number]      Assessment:   Pulmonary embolism   Obstructive sleep apnea   Morbid obesity   Pulmonary hypertension  Plan:    Increase warfarin to 15 mg on Mondays, Wednesdays and Fridays and keep them at 10 mg on the rest of the week   Weekly INR until we achieve therapeutic range between 2 and 3 then will switch it to monthly   Auto CPAP 5-20 cm water   Weight loss   Follow-up in 3 months  Discussion:   The patient's dyspnea on exertion is better  Her INR still subtherapeutic  I have increased the warfarin to 15 mg 3 times a week and she will keep 10 mg for the rest of the week  I will repeat the INR weekly and then if she achieves a therapeutic range between 2-3 we will do it monthly  She has severe obstructive sleep apnea based on the results of the polysomnogram   I have ordered auto CPAP 5-20 cm water  I told the patient to start using it while she is awake to get more comfortable and used to it  We talked about her weight  Hopefully with the treatment of the obstructive sleep apnea the patient will have more stamina and be able to exercise more  Her podiatrist is planning on removing the pain from her leg  She needs to have the warfarin  For about 5 days prior to the procedure  The warfarin can be resumed today after the procedures done  I will see her in 3 months in a follow-up visit  Subjective: The patient is here for a follow-up visit  She stated her dyspnea on exertion has markedly improved  She denies cough, wheezing or sputum production  Denies any chest pain or palpitations  She had a polysomnogram done  She has not received the CPAP machine yet  She is taking warfarin 10 mg a day  Review of systems:  A 12 point system review is done and aside from what is stated above the rest of the review of systems is negative        Family history and social history are reviewed  Medications list is reviewed  Vitals: Blood pressure 130/82, pulse 91, temperature 98 3 °F (36 8 °C), resp  rate 18, height 5' 8" (1 727 m), weight (!) 153 kg (338 lb), SpO2 97 %  ,     Physical Exam  Gen: Awake, alert, oriented x 3, no acute distress  HEENT: Mucous membranes moist, no oral lesions, no thrush  NECK: No accessory muscle use, JVP not elevated  Cardiac: Regular, single S1, single S2, no murmurs, no rubs, no gallops  Lungs:  Decreased breath sounds  No wheezing or rhonchi  Abdomen: normoactive bowel sounds, soft nontender, nondistended, no rebound or rigidity, no guarding  Extremities: no cyanosis, no clubbing  3+ edema  Neuro:  Grossly nonfocal   Skin:  No rash      Lab Results   Component Value Date    INR 1 68 (H) 03/25/2020    INR 3 07 (H) 02/09/2020    INR 3 10 (H) 02/08/2020    PROTIME 19 3 (H) 03/25/2020    PROTIME 33 3 (H) 02/09/2020    PROTIME 33 3 (H) 02/08/2020

## 2020-04-07 ENCOUNTER — APPOINTMENT (OUTPATIENT)
Dept: LAB | Facility: CLINIC | Age: 60
End: 2020-04-07
Payer: COMMERCIAL

## 2020-04-07 DIAGNOSIS — I26.02 ACUTE SADDLE PULMONARY EMBOLISM WITH ACUTE COR PULMONALE (HCC): ICD-10-CM

## 2020-04-07 LAB
INR PPP: 1.87 (ref 0.84–1.19)
PROTHROMBIN TIME: 21 SECONDS (ref 11.6–14.5)

## 2020-04-07 PROCEDURE — 36415 COLL VENOUS BLD VENIPUNCTURE: CPT

## 2020-04-07 PROCEDURE — 85610 PROTHROMBIN TIME: CPT

## 2020-05-06 ENCOUNTER — TELEPHONE (OUTPATIENT)
Dept: PULMONOLOGY | Facility: CLINIC | Age: 60
End: 2020-05-06

## 2020-05-12 ENCOUNTER — TELEPHONE (OUTPATIENT)
Dept: PULMONOLOGY | Facility: CLINIC | Age: 60
End: 2020-05-12

## 2020-05-12 DIAGNOSIS — I26.02 ACUTE SADDLE PULMONARY EMBOLISM WITH ACUTE COR PULMONALE (HCC): Primary | ICD-10-CM

## 2020-05-13 DIAGNOSIS — B34.9 VIRAL DISEASE: ICD-10-CM

## 2020-05-13 PROCEDURE — U0003 INFECTIOUS AGENT DETECTION BY NUCLEIC ACID (DNA OR RNA); SEVERE ACUTE RESPIRATORY SYNDROME CORONAVIRUS 2 (SARS-COV-2) (CORONAVIRUS DISEASE [COVID-19]), AMPLIFIED PROBE TECHNIQUE, MAKING USE OF HIGH THROUGHPUT TECHNOLOGIES AS DESCRIBED BY CMS-2020-01-R: HCPCS

## 2020-05-14 LAB — SARS-COV-2 RNA RESP QL NAA+PROBE: NEGATIVE

## 2020-05-15 ENCOUNTER — TELEPHONE (OUTPATIENT)
Dept: PULMONOLOGY | Facility: CLINIC | Age: 60
End: 2020-05-15

## 2020-05-15 ENCOUNTER — APPOINTMENT (OUTPATIENT)
Dept: LAB | Facility: CLINIC | Age: 60
End: 2020-05-15
Payer: COMMERCIAL

## 2020-05-15 DIAGNOSIS — I26.02 ACUTE SADDLE PULMONARY EMBOLISM WITH ACUTE COR PULMONALE (HCC): ICD-10-CM

## 2020-05-15 LAB
INR PPP: 1.15 (ref 0.84–1.19)
PROTHROMBIN TIME: 14.3 SECONDS (ref 11.6–14.5)

## 2020-05-15 PROCEDURE — 85610 PROTHROMBIN TIME: CPT

## 2020-05-15 PROCEDURE — 36415 COLL VENOUS BLD VENIPUNCTURE: CPT

## 2020-05-18 ENCOUNTER — ANESTHESIA EVENT (OUTPATIENT)
Dept: PERIOP | Facility: HOSPITAL | Age: 60
End: 2020-05-18
Payer: COMMERCIAL

## 2020-05-18 ENCOUNTER — TELEPHONE (OUTPATIENT)
Dept: OTHER | Facility: HOSPITAL | Age: 60
End: 2020-05-18

## 2020-05-18 DIAGNOSIS — I26.09 ACUTE PULMONARY EMBOLISM WITH ACUTE COR PULMONALE, UNSPECIFIED PULMONARY EMBOLISM TYPE (HCC): Primary | ICD-10-CM

## 2020-05-19 ENCOUNTER — HOSPITAL ENCOUNTER (OUTPATIENT)
Facility: HOSPITAL | Age: 60
Setting detail: OUTPATIENT SURGERY
Discharge: HOME | End: 2020-05-19
Attending: PODIATRIST | Admitting: PODIATRIST
Payer: COMMERCIAL

## 2020-05-19 ENCOUNTER — APPOINTMENT (OUTPATIENT)
Dept: RADIOLOGY | Facility: HOSPITAL | Age: 60
End: 2020-05-19
Payer: COMMERCIAL

## 2020-05-19 ENCOUNTER — ANESTHESIA (OUTPATIENT)
Dept: PERIOP | Facility: HOSPITAL | Age: 60
End: 2020-05-19
Payer: COMMERCIAL

## 2020-05-19 VITALS
OXYGEN SATURATION: 93 % | DIASTOLIC BLOOD PRESSURE: 64 MMHG | SYSTOLIC BLOOD PRESSURE: 129 MMHG | TEMPERATURE: 97.4 F | HEART RATE: 83 BPM | RESPIRATION RATE: 16 BRPM

## 2020-05-19 DIAGNOSIS — Z47.89 SURGICAL AFTERCARE, MUSCULOSKELETAL SYSTEM: ICD-10-CM

## 2020-05-19 DIAGNOSIS — B34.9 VIRAL DISEASE: Primary | ICD-10-CM

## 2020-05-19 PROCEDURE — 73620 X-RAY EXAM OF FOOT: CPT

## 2020-05-19 PROCEDURE — 82948 REAGENT STRIP/BLOOD GLUCOSE: CPT

## 2020-05-19 PROCEDURE — 73630 X-RAY EXAM OF FOOT: CPT

## 2020-05-19 RX ORDER — LIDOCAINE HYDROCHLORIDE 10 MG/ML
INJECTION, SOLUTION EPIDURAL; INFILTRATION; INTRACAUDAL; PERINEURAL AS NEEDED
Status: DISCONTINUED | OUTPATIENT
Start: 2020-05-19 | End: 2020-05-19 | Stop reason: SURG

## 2020-05-19 RX ORDER — BUPIVACAINE HYDROCHLORIDE 5 MG/ML
INJECTION, SOLUTION PERINEURAL AS NEEDED
Status: DISCONTINUED | OUTPATIENT
Start: 2020-05-19 | End: 2020-05-19 | Stop reason: HOSPADM

## 2020-05-19 RX ORDER — OXYCODONE HYDROCHLORIDE AND ACETAMINOPHEN 5; 325 MG/1; MG/1
1 TABLET ORAL EVERY 4 HOURS PRN
Status: DISCONTINUED | OUTPATIENT
Start: 2020-05-19 | End: 2020-05-19 | Stop reason: HOSPADM

## 2020-05-19 RX ORDER — ONDANSETRON 2 MG/ML
INJECTION INTRAMUSCULAR; INTRAVENOUS AS NEEDED
Status: DISCONTINUED | OUTPATIENT
Start: 2020-05-19 | End: 2020-05-19 | Stop reason: SURG

## 2020-05-19 RX ORDER — ONDANSETRON 2 MG/ML
4 INJECTION INTRAMUSCULAR; INTRAVENOUS ONCE AS NEEDED
Status: DISCONTINUED | OUTPATIENT
Start: 2020-05-19 | End: 2020-05-19 | Stop reason: HOSPADM

## 2020-05-19 RX ORDER — MAGNESIUM HYDROXIDE 1200 MG/15ML
LIQUID ORAL AS NEEDED
Status: DISCONTINUED | OUTPATIENT
Start: 2020-05-19 | End: 2020-05-19 | Stop reason: HOSPADM

## 2020-05-19 RX ORDER — HYDROMORPHONE HCL/PF 1 MG/ML
0.5 SYRINGE (ML) INJECTION
Status: DISCONTINUED | OUTPATIENT
Start: 2020-05-19 | End: 2020-05-19 | Stop reason: HOSPADM

## 2020-05-19 RX ORDER — PROMETHAZINE HYDROCHLORIDE 25 MG/ML
12.5 INJECTION, SOLUTION INTRAMUSCULAR; INTRAVENOUS ONCE AS NEEDED
Status: DISCONTINUED | OUTPATIENT
Start: 2020-05-19 | End: 2020-05-19 | Stop reason: HOSPADM

## 2020-05-19 RX ORDER — CEFAZOLIN SODIUM 2 G/50ML
2000 SOLUTION INTRAVENOUS ONCE
Status: COMPLETED | OUTPATIENT
Start: 2020-05-19 | End: 2020-05-19

## 2020-05-19 RX ORDER — PROPOFOL 10 MG/ML
INJECTION, EMULSION INTRAVENOUS CONTINUOUS PRN
Status: DISCONTINUED | OUTPATIENT
Start: 2020-05-19 | End: 2020-05-19 | Stop reason: SURG

## 2020-05-19 RX ORDER — GLYCOPYRROLATE 0.2 MG/ML
INJECTION INTRAMUSCULAR; INTRAVENOUS AS NEEDED
Status: DISCONTINUED | OUTPATIENT
Start: 2020-05-19 | End: 2020-05-19 | Stop reason: SURG

## 2020-05-19 RX ORDER — SODIUM CHLORIDE, SODIUM LACTATE, POTASSIUM CHLORIDE, CALCIUM CHLORIDE 600; 310; 30; 20 MG/100ML; MG/100ML; MG/100ML; MG/100ML
50 INJECTION, SOLUTION INTRAVENOUS CONTINUOUS
Status: DISCONTINUED | OUTPATIENT
Start: 2020-05-19 | End: 2020-05-19 | Stop reason: HOSPADM

## 2020-05-19 RX ORDER — PROPOFOL 10 MG/ML
INJECTION, EMULSION INTRAVENOUS AS NEEDED
Status: DISCONTINUED | OUTPATIENT
Start: 2020-05-19 | End: 2020-05-19 | Stop reason: SURG

## 2020-05-19 RX ORDER — MIDAZOLAM HYDROCHLORIDE 2 MG/2ML
INJECTION, SOLUTION INTRAMUSCULAR; INTRAVENOUS AS NEEDED
Status: DISCONTINUED | OUTPATIENT
Start: 2020-05-19 | End: 2020-05-19 | Stop reason: SURG

## 2020-05-19 RX ORDER — FENTANYL CITRATE 50 UG/ML
INJECTION, SOLUTION INTRAMUSCULAR; INTRAVENOUS AS NEEDED
Status: DISCONTINUED | OUTPATIENT
Start: 2020-05-19 | End: 2020-05-19 | Stop reason: SURG

## 2020-05-19 RX ORDER — OXYCODONE HYDROCHLORIDE AND ACETAMINOPHEN 5; 325 MG/1; MG/1
1 TABLET ORAL EVERY 4 HOURS PRN
Qty: 10 TABLET | Refills: 0 | Status: SHIPPED | OUTPATIENT
Start: 2020-05-19 | End: 2020-05-29

## 2020-05-19 RX ADMIN — CEFAZOLIN SODIUM 3000 MG: 2 SOLUTION INTRAVENOUS at 12:55

## 2020-05-19 RX ADMIN — MIDAZOLAM HYDROCHLORIDE 2 MG: 1 INJECTION, SOLUTION INTRAMUSCULAR; INTRAVENOUS at 12:50

## 2020-05-19 RX ADMIN — PROPOFOL 100 MG: 10 INJECTION, EMULSION INTRAVENOUS at 12:57

## 2020-05-19 RX ADMIN — ONDANSETRON HYDROCHLORIDE 4 MG: 2 INJECTION, SOLUTION INTRAMUSCULAR; INTRAVENOUS at 13:14

## 2020-05-19 RX ADMIN — LIDOCAINE HYDROCHLORIDE 80 MG: 10 INJECTION, SOLUTION EPIDURAL; INFILTRATION; INTRACAUDAL; PERINEURAL at 12:57

## 2020-05-19 RX ADMIN — GLYCOPYRROLATE 0.2 MG: 0.2 INJECTION, SOLUTION INTRAMUSCULAR; INTRAVENOUS at 13:06

## 2020-05-19 RX ADMIN — PROPOFOL 120 MCG/KG/MIN: 10 INJECTION, EMULSION INTRAVENOUS at 12:56

## 2020-05-19 RX ADMIN — FENTANYL CITRATE 50 MCG: 50 INJECTION INTRAMUSCULAR; INTRAVENOUS at 12:50

## 2020-05-19 RX ADMIN — ENOXAPARIN SODIUM 40 MG: 40 INJECTION SUBCUTANEOUS at 15:16

## 2020-05-19 RX ADMIN — SODIUM CHLORIDE, SODIUM LACTATE, POTASSIUM CHLORIDE, AND CALCIUM CHLORIDE: .6; .31; .03; .02 INJECTION, SOLUTION INTRAVENOUS at 12:51

## 2020-05-20 LAB
GLUCOSE SERPL-MCNC: 91 MG/DL (ref 65–140)
GLUCOSE SERPL-MCNC: 94 MG/DL (ref 65–140)

## 2020-05-29 ENCOUNTER — APPOINTMENT (OUTPATIENT)
Dept: LAB | Facility: CLINIC | Age: 60
End: 2020-05-29
Payer: COMMERCIAL

## 2020-05-29 DIAGNOSIS — I26.09 ACUTE PULMONARY EMBOLISM WITH ACUTE COR PULMONALE, UNSPECIFIED PULMONARY EMBOLISM TYPE (HCC): ICD-10-CM

## 2020-05-29 LAB
INR PPP: 1.2 (ref 0.84–1.19)
PROTHROMBIN TIME: 14.8 SECONDS (ref 11.6–14.5)

## 2020-05-29 PROCEDURE — 36415 COLL VENOUS BLD VENIPUNCTURE: CPT

## 2020-05-29 PROCEDURE — 85610 PROTHROMBIN TIME: CPT

## 2020-06-01 ENCOUNTER — TELEPHONE (OUTPATIENT)
Dept: OTHER | Facility: HOSPITAL | Age: 60
End: 2020-06-01

## 2020-06-09 ENCOUNTER — TELEPHONE (OUTPATIENT)
Dept: PULMONOLOGY | Facility: CLINIC | Age: 60
End: 2020-06-09

## 2020-07-27 ENCOUNTER — TELEPHONE (OUTPATIENT)
Dept: PULMONOLOGY | Facility: CLINIC | Age: 60
End: 2020-07-27

## 2020-07-27 NOTE — TELEPHONE ENCOUNTER
I faxed 12 pages of clinical records to [facility] today  They were requesting medical information regarding her HST

## 2021-03-01 ENCOUNTER — TRANSCRIBE ORDERS (OUTPATIENT)
Dept: ADMINISTRATIVE | Facility: HOSPITAL | Age: 61
End: 2021-03-01

## 2021-03-01 DIAGNOSIS — Z12.31 OTHER SCREENING MAMMOGRAM: Primary | ICD-10-CM

## 2024-10-01 NOTE — ED NOTES
Patient transported to [facility]  [place], [address] [place] [address]  12/09/19 2054 [FreeTextEntry1] : PFT normal

## (undated) DEVICE — INSUFFLATION TUBING PRIMFLO

## (undated) DEVICE — STERILE POLYISOPRENE POWDER-FREE SURGICAL GLOVES: Brand: PROTEXIS

## (undated) DEVICE — STERILE SYNTHETIC NEOPRENE POWDER-FREE SURGICAL GLOVES WITH NITRILE COATING, SMOOTH FINISH, STRAIGHT FINGER: Brand: PROTEXIS

## (undated) DEVICE — TUBING SUCTION 5MM X 12 FT

## (undated) DEVICE — STANDARD SURGICAL GOWN, L: Brand: CONVERTORS

## (undated) DEVICE — SCD SEQUENTIAL COMPRESSION COMFORT SLEEVE MEDIUM KNEE LENGTH: Brand: KENDALL SCD

## (undated) DEVICE — CHLORAPREP HI-LITE 26ML ORANGE

## (undated) DEVICE — KERLIX BANDAGE ROLL: Brand: KERLIX

## (undated) DEVICE — NEEDLE 25G X 1 1/2

## (undated) DEVICE — PENCIL ELECTROSURG E-Z CLEAN -0035H

## (undated) DEVICE — DRAPE C-ARM X-RAY

## (undated) DEVICE — CUFF TOURNIQUET DISP SZ18

## (undated) DEVICE — BETHLEHEM UNIVERSAL  MIONR EXT: Brand: CARDINAL HEALTH

## (undated) DEVICE — INTENDED FOR TISSUE SEPARATION, AND OTHER PROCEDURES THAT REQUIRE A SHARP SURGICAL BLADE TO PUNCTURE OR CUT.: Brand: BARD-PARKER SAFETY BLADES SIZE 15, STERILE

## (undated) DEVICE — SUT ETHILON 3-0 PS-1 18 IN 1663H

## (undated) DEVICE — PVC URETHRAL CATHETER: Brand: DOVER

## (undated) DEVICE — INTENDED FOR TISSUE SEPARATION, AND OTHER PROCEDURES THAT REQUIRE A SHARP SURGICAL BLADE TO PUNCTURE OR CUT.: Brand: BARD-PARKER ® SAFETYLOCK CARBON RIB-BACK BLADES

## (undated) DEVICE — SYRINGE CATH TIP 50ML

## (undated) DEVICE — GLOVE INDICATOR PI UNDERGLOVE SZ 8 BLUE

## (undated) DEVICE — STOCKINETTE,IMPERVIOUS,12X48,STERILE: Brand: MEDLINE

## (undated) DEVICE — LISFRANC SCREW
Type: IMPLANTABLE DEVICE | Site: TOE | Status: NON-FUNCTIONAL
Brand: CHARLOTTE

## (undated) DEVICE — ADHESIVE SKIN HIGH VISCOSITY EXOFIN 1ML

## (undated) DEVICE — DRAPE C-ARMOUR

## (undated) DEVICE — UNDERGLOVE PROTEXIS  BLUE SZ 7

## (undated) DEVICE — COBAN 4 IN STERILE

## (undated) DEVICE — SYRINGE 10ML LL CONTROL TOP

## (undated) DEVICE — STRETCH BANDAGE: Brand: CURITY

## (undated) DEVICE — OCCLUSIVE GAUZE STRIP,3% BISMUTH TRIBROMOPHENATE IN PETROLATUM BLEND: Brand: XEROFORM

## (undated) DEVICE — STRL UNIVERSAL MINOR VAGINAL: Brand: CARDINAL HEALTH

## (undated) DEVICE — PADDING CAST 4 IN  COTTON STRL

## (undated) DEVICE — LISFRANC DRILL BIT: Brand: CHARLOTTE

## (undated) DEVICE — STERILE POLYISOPRENE POWDER-FREE SURGICAL GLOVES WITH EMOLLIENT COATING: Brand: PROTEXIS

## (undated) DEVICE — CUFF TOURNIQUET DISP SZ24

## (undated) DEVICE — GLOVE SRG BIOGEL 7.5

## (undated) DEVICE — SYRINGE 10ML LL

## (undated) DEVICE — NEEDLE BLUNT 18 G X 1 1/2IN

## (undated) DEVICE — SINGLE TROCAR WIRE
Type: IMPLANTABLE DEVICE | Site: TOE | Status: NON-FUNCTIONAL
Brand: CHARLOTTE
Removed: 2019-12-16

## (undated) DEVICE — 4-PORT MANIFOLD: Brand: NEPTUNE 2

## (undated) DEVICE — CURITY NON-ADHERENT STRIPS: Brand: CURITY

## (undated) DEVICE — SUT MONOCRYL 4-0 PS-2 27 IN Y426H